# Patient Record
Sex: FEMALE | Race: WHITE | Employment: OTHER | URBAN - METROPOLITAN AREA
[De-identification: names, ages, dates, MRNs, and addresses within clinical notes are randomized per-mention and may not be internally consistent; named-entity substitution may affect disease eponyms.]

---

## 2018-01-01 ENCOUNTER — APPOINTMENT (OUTPATIENT)
Dept: GENERAL RADIOLOGY | Age: 78
DRG: 191 | End: 2018-01-01
Attending: EMERGENCY MEDICINE
Payer: MEDICARE

## 2018-01-01 ENCOUNTER — HOME CARE VISIT (OUTPATIENT)
Dept: SCHEDULING | Facility: HOME HEALTH | Age: 78
End: 2018-01-01
Payer: MEDICARE

## 2018-01-01 ENCOUNTER — PATIENT OUTREACH (OUTPATIENT)
Dept: CASE MANAGEMENT | Age: 78
End: 2018-01-01

## 2018-01-01 ENCOUNTER — HOME CARE VISIT (OUTPATIENT)
Dept: HOME HEALTH SERVICES | Facility: HOME HEALTH | Age: 78
End: 2018-01-01
Payer: MEDICARE

## 2018-01-01 ENCOUNTER — HOME HEALTH ADMISSION (OUTPATIENT)
Dept: HOME HEALTH SERVICES | Facility: HOME HEALTH | Age: 78
End: 2018-01-01
Payer: MEDICARE

## 2018-01-01 ENCOUNTER — APPOINTMENT (OUTPATIENT)
Dept: GENERAL RADIOLOGY | Age: 78
DRG: 191 | End: 2018-01-01
Attending: INTERNAL MEDICINE
Payer: MEDICARE

## 2018-01-01 ENCOUNTER — TELEPHONE (OUTPATIENT)
Dept: HOME HEALTH SERVICES | Facility: HOME HEALTH | Age: 78
End: 2018-01-01

## 2018-01-01 ENCOUNTER — HOSPITAL ENCOUNTER (INPATIENT)
Age: 78
LOS: 4 days | Discharge: HOME | DRG: 191 | End: 2018-10-16
Attending: EMERGENCY MEDICINE | Admitting: INTERNAL MEDICINE
Payer: MEDICARE

## 2018-01-01 ENCOUNTER — APPOINTMENT (OUTPATIENT)
Dept: GENERAL RADIOLOGY | Age: 78
DRG: 191 | End: 2018-01-01
Attending: FAMILY MEDICINE
Payer: MEDICARE

## 2018-01-01 ENCOUNTER — HOSPITAL ENCOUNTER (EMERGENCY)
Age: 78
Discharge: HOME | End: 2018-06-07
Attending: EMERGENCY MEDICINE
Payer: MEDICARE

## 2018-01-01 ENCOUNTER — APPOINTMENT (OUTPATIENT)
Dept: GENERAL RADIOLOGY | Age: 78
DRG: 871 | End: 2018-01-01
Attending: EMERGENCY MEDICINE
Payer: MEDICARE

## 2018-01-01 ENCOUNTER — APPOINTMENT (OUTPATIENT)
Dept: GENERAL RADIOLOGY | Age: 78
DRG: 871 | End: 2018-01-01
Attending: INTERNAL MEDICINE
Payer: MEDICARE

## 2018-01-01 ENCOUNTER — HOSPICE ADMISSION (OUTPATIENT)
Dept: HOSPICE | Facility: HOSPICE | Age: 78
End: 2018-01-01

## 2018-01-01 ENCOUNTER — APPOINTMENT (OUTPATIENT)
Dept: GENERAL RADIOLOGY | Age: 78
End: 2018-01-01
Attending: EMERGENCY MEDICINE
Payer: MEDICARE

## 2018-01-01 ENCOUNTER — HOSPITAL ENCOUNTER (INPATIENT)
Age: 78
LOS: 7 days | Discharge: NURSING, REHAB OR LONG-TERM CARE | DRG: 871 | End: 2018-11-28
Attending: FAMILY MEDICINE | Admitting: FAMILY MEDICINE
Payer: MEDICARE

## 2018-01-01 ENCOUNTER — APPOINTMENT (OUTPATIENT)
Dept: CT IMAGING | Age: 78
DRG: 871 | End: 2018-01-01
Attending: INTERNAL MEDICINE
Payer: MEDICARE

## 2018-01-01 ENCOUNTER — HOSPITAL ENCOUNTER (INPATIENT)
Age: 78
LOS: 6 days | Discharge: NURSING, REHAB OR LONG-TERM CARE | DRG: 871 | End: 2018-11-21
Attending: EMERGENCY MEDICINE | Admitting: INTERNAL MEDICINE
Payer: MEDICARE

## 2018-01-01 VITALS
DIASTOLIC BLOOD PRESSURE: 64 MMHG | TEMPERATURE: 98.1 F | SYSTOLIC BLOOD PRESSURE: 110 MMHG | OXYGEN SATURATION: 93 % | HEART RATE: 88 BPM | RESPIRATION RATE: 18 BRPM

## 2018-01-01 VITALS
RESPIRATION RATE: 22 BRPM | WEIGHT: 110 LBS | SYSTOLIC BLOOD PRESSURE: 169 MMHG | HEART RATE: 90 BPM | TEMPERATURE: 97.9 F | HEIGHT: 63 IN | OXYGEN SATURATION: 93 % | DIASTOLIC BLOOD PRESSURE: 77 MMHG | BODY MASS INDEX: 19.49 KG/M2

## 2018-01-01 VITALS
HEART RATE: 84 BPM | SYSTOLIC BLOOD PRESSURE: 122 MMHG | DIASTOLIC BLOOD PRESSURE: 64 MMHG | TEMPERATURE: 97.9 F | OXYGEN SATURATION: 98 % | RESPIRATION RATE: 18 BRPM

## 2018-01-01 VITALS
BODY MASS INDEX: 23.04 KG/M2 | OXYGEN SATURATION: 95 % | HEART RATE: 98 BPM | DIASTOLIC BLOOD PRESSURE: 89 MMHG | HEIGHT: 63 IN | TEMPERATURE: 98.3 F | RESPIRATION RATE: 20 BRPM | SYSTOLIC BLOOD PRESSURE: 164 MMHG | WEIGHT: 130 LBS

## 2018-01-01 VITALS
OXYGEN SATURATION: 98 % | TEMPERATURE: 98.2 F | HEART RATE: 84 BPM | SYSTOLIC BLOOD PRESSURE: 148 MMHG | DIASTOLIC BLOOD PRESSURE: 78 MMHG | RESPIRATION RATE: 18 BRPM

## 2018-01-01 VITALS
WEIGHT: 110 LBS | HEIGHT: 63 IN | HEART RATE: 82 BPM | OXYGEN SATURATION: 97 % | RESPIRATION RATE: 22 BRPM | TEMPERATURE: 97.6 F | SYSTOLIC BLOOD PRESSURE: 137 MMHG | BODY MASS INDEX: 19.49 KG/M2 | DIASTOLIC BLOOD PRESSURE: 65 MMHG

## 2018-01-01 VITALS
DIASTOLIC BLOOD PRESSURE: 60 MMHG | OXYGEN SATURATION: 92 % | RESPIRATION RATE: 18 BRPM | SYSTOLIC BLOOD PRESSURE: 140 MMHG | TEMPERATURE: 97.6 F | HEART RATE: 110 BPM

## 2018-01-01 VITALS
OXYGEN SATURATION: 98 % | RESPIRATION RATE: 20 BRPM | WEIGHT: 121.5 LBS | DIASTOLIC BLOOD PRESSURE: 59 MMHG | SYSTOLIC BLOOD PRESSURE: 101 MMHG | HEART RATE: 83 BPM | HEIGHT: 64 IN | TEMPERATURE: 97.6 F | BODY MASS INDEX: 20.74 KG/M2

## 2018-01-01 DIAGNOSIS — J44.1 ACUTE EXACERBATION OF CHRONIC OBSTRUCTIVE PULMONARY DISEASE (COPD) (HCC): Primary | ICD-10-CM

## 2018-01-01 DIAGNOSIS — A41.9 SEPSIS, DUE TO UNSPECIFIED ORGANISM: ICD-10-CM

## 2018-01-01 DIAGNOSIS — I50.33 ACUTE ON CHRONIC DIASTOLIC HEART FAILURE (HCC): Chronic | ICD-10-CM

## 2018-01-01 DIAGNOSIS — F17.210 CIGARETTE NICOTINE DEPENDENCE WITHOUT COMPLICATION: Chronic | ICD-10-CM

## 2018-01-01 DIAGNOSIS — J18.9 HCAP (HEALTHCARE-ASSOCIATED PNEUMONIA): ICD-10-CM

## 2018-01-01 DIAGNOSIS — J44.1 COPD, FREQUENT EXACERBATIONS (HCC): ICD-10-CM

## 2018-01-01 DIAGNOSIS — Z51.5 ENCOUNTER FOR PALLIATIVE CARE: ICD-10-CM

## 2018-01-01 DIAGNOSIS — J44.9 COPD, VERY SEVERE (HCC): Chronic | ICD-10-CM

## 2018-01-01 DIAGNOSIS — J96.21 ACUTE ON CHRONIC RESPIRATORY FAILURE WITH HYPOXIA AND HYPERCAPNIA (HCC): ICD-10-CM

## 2018-01-01 DIAGNOSIS — G93.41 METABOLIC ENCEPHALOPATHY: ICD-10-CM

## 2018-01-01 DIAGNOSIS — R06.02 SHORTNESS OF BREATH: ICD-10-CM

## 2018-01-01 DIAGNOSIS — Z66 DNR (DO NOT RESUSCITATE): ICD-10-CM

## 2018-01-01 DIAGNOSIS — R53.83 FATIGUE, UNSPECIFIED TYPE: ICD-10-CM

## 2018-01-01 DIAGNOSIS — R53.81 DEBILITY: ICD-10-CM

## 2018-01-01 DIAGNOSIS — D64.9 ANEMIA, UNSPECIFIED TYPE: ICD-10-CM

## 2018-01-01 DIAGNOSIS — N17.9 AKI (ACUTE KIDNEY INJURY) (HCC): ICD-10-CM

## 2018-01-01 DIAGNOSIS — J96.22 ACUTE ON CHRONIC RESPIRATORY FAILURE WITH HYPOXIA AND HYPERCAPNIA (HCC): ICD-10-CM

## 2018-01-01 DIAGNOSIS — I25.118 ATHEROSCLEROSIS OF NATIVE CORONARY ARTERY OF NATIVE HEART WITH STABLE ANGINA PECTORIS (HCC): Chronic | ICD-10-CM

## 2018-01-01 DIAGNOSIS — R54 FRAILTY: ICD-10-CM

## 2018-01-01 DIAGNOSIS — I10 HTN (HYPERTENSION), MALIGNANT: ICD-10-CM

## 2018-01-01 DIAGNOSIS — Z95.2 S/P AVR (AORTIC VALVE REPLACEMENT): Chronic | ICD-10-CM

## 2018-01-01 DIAGNOSIS — G93.41 ACUTE METABOLIC ENCEPHALOPATHY: Primary | ICD-10-CM

## 2018-01-01 DIAGNOSIS — J44.1 COPD EXACERBATION (HCC): ICD-10-CM

## 2018-01-01 LAB
ABO + RH BLD: NORMAL
ABO + RH BLD: NORMAL
ALBUMIN SERPL-MCNC: 2 G/DL (ref 3.2–4.6)
ALBUMIN SERPL-MCNC: 2.4 G/DL (ref 3.2–4.6)
ALBUMIN SERPL-MCNC: 3 G/DL (ref 3.2–4.6)
ALBUMIN SERPL-MCNC: 3.4 G/DL (ref 3.2–4.6)
ALBUMIN SERPL-MCNC: 3.5 G/DL (ref 3.2–4.6)
ALBUMIN/GLOB SERPL: 0.5 {RATIO} (ref 1.2–3.5)
ALBUMIN/GLOB SERPL: 0.6 {RATIO} (ref 1.2–3.5)
ALBUMIN/GLOB SERPL: 0.7 {RATIO} (ref 1.2–3.5)
ALBUMIN/GLOB SERPL: 0.7 {RATIO} (ref 1.2–3.5)
ALBUMIN/GLOB SERPL: 0.8 {RATIO} (ref 1.2–3.5)
ALP SERPL-CCNC: 105 U/L (ref 50–136)
ALP SERPL-CCNC: 114 U/L (ref 50–136)
ALP SERPL-CCNC: 56 U/L (ref 50–136)
ALP SERPL-CCNC: 73 U/L (ref 50–136)
ALP SERPL-CCNC: 81 U/L (ref 50–136)
ALT SERPL-CCNC: 19 U/L (ref 12–65)
ALT SERPL-CCNC: 20 U/L (ref 12–65)
ALT SERPL-CCNC: 22 U/L (ref 12–65)
ALT SERPL-CCNC: 23 U/L (ref 12–65)
ALT SERPL-CCNC: 24 U/L (ref 12–65)
AMMONIA PLAS-SCNC: 28 UMOL/L (ref 11–32)
AMPHET UR QL SCN: NEGATIVE
ANION GAP SERPL CALC-SCNC: 1 MMOL/L (ref 7–16)
ANION GAP SERPL CALC-SCNC: 10 MMOL/L (ref 7–16)
ANION GAP SERPL CALC-SCNC: 11 MMOL/L (ref 7–16)
ANION GAP SERPL CALC-SCNC: 11 MMOL/L (ref 7–16)
ANION GAP SERPL CALC-SCNC: 14 MMOL/L (ref 7–16)
ANION GAP SERPL CALC-SCNC: 2 MMOL/L (ref 7–16)
ANION GAP SERPL CALC-SCNC: 3 MMOL/L (ref 7–16)
ANION GAP SERPL CALC-SCNC: 3 MMOL/L (ref 7–16)
ANION GAP SERPL CALC-SCNC: 4 MMOL/L (ref 7–16)
ANION GAP SERPL CALC-SCNC: 5 MMOL/L (ref 7–16)
ANION GAP SERPL CALC-SCNC: 5 MMOL/L (ref 7–16)
ANION GAP SERPL CALC-SCNC: 6 MMOL/L (ref 7–16)
ANION GAP SERPL CALC-SCNC: 6 MMOL/L (ref 7–16)
ANION GAP SERPL CALC-SCNC: 7 MMOL/L (ref 7–16)
ANION GAP SERPL CALC-SCNC: 8 MMOL/L (ref 7–16)
APPEARANCE UR: CLEAR
APPEARANCE UR: CLEAR
ARTERIAL PATENCY WRIST A: POSITIVE
ARTERIAL PATENCY WRIST A: YES
AST SERPL-CCNC: 13 U/L (ref 15–37)
AST SERPL-CCNC: 16 U/L (ref 15–37)
AST SERPL-CCNC: 20 U/L (ref 15–37)
AST SERPL-CCNC: 28 U/L (ref 15–37)
AST SERPL-CCNC: 29 U/L (ref 15–37)
ATRIAL RATE: 128 BPM
ATRIAL RATE: 128 BPM
ATRIAL RATE: 76 BPM
ATRIAL RATE: 93 BPM
ATRIAL RATE: 97 BPM
ATRIAL RATE: 98 BPM
BACTERIA SPEC CULT: NORMAL
BACTERIA URNS QL MICRO: 0 /HPF
BACTERIA URNS QL MICRO: ABNORMAL /HPF
BARBITURATES UR QL SCN: NEGATIVE
BASE EXCESS BLD CALC-SCNC: 10 MMOL/L
BASE EXCESS BLD CALC-SCNC: 11 MMOL/L
BASE EXCESS BLD CALC-SCNC: 12 MMOL/L
BASE EXCESS BLD CALC-SCNC: 13 MMOL/L
BASE EXCESS BLD CALC-SCNC: 13 MMOL/L
BASE EXCESS BLD CALC-SCNC: 15 MMOL/L
BASE EXCESS BLD CALC-SCNC: 8 MMOL/L
BASE EXCESS BLD CALC-SCNC: 8 MMOL/L
BASE EXCESS BLD CALC-SCNC: 9 MMOL/L
BASE EXCESS BLDA CALC-SCNC: 4.8 MMOL/L (ref 0–3)
BASOPHILS # BLD: 0 K/UL (ref 0–0.2)
BASOPHILS NFR BLD: 0 % (ref 0–2)
BDY SITE: ABNORMAL
BENZODIAZ UR QL: NEGATIVE
BILIRUB SERPL-MCNC: 0.2 MG/DL (ref 0.2–1.1)
BILIRUB SERPL-MCNC: 0.3 MG/DL (ref 0.2–1.1)
BILIRUB SERPL-MCNC: 0.3 MG/DL (ref 0.2–1.1)
BILIRUB SERPL-MCNC: 0.4 MG/DL (ref 0.2–1.1)
BILIRUB SERPL-MCNC: 1 MG/DL (ref 0.2–1.1)
BILIRUB UR QL: NEGATIVE
BILIRUB UR QL: NEGATIVE
BLD PROD TYP BPU: NORMAL
BLOOD GROUP ANTIBODIES SERPL: NORMAL
BNP SERPL-MCNC: 805 PG/ML
BODY TEMPERATURE: 98.6
BPU ID: NORMAL
BUN SERPL-MCNC: 12 MG/DL (ref 8–23)
BUN SERPL-MCNC: 15 MG/DL (ref 8–23)
BUN SERPL-MCNC: 16 MG/DL (ref 8–23)
BUN SERPL-MCNC: 16 MG/DL (ref 8–23)
BUN SERPL-MCNC: 17 MG/DL (ref 8–23)
BUN SERPL-MCNC: 18 MG/DL (ref 8–23)
BUN SERPL-MCNC: 19 MG/DL (ref 8–23)
BUN SERPL-MCNC: 20 MG/DL (ref 8–23)
BUN SERPL-MCNC: 21 MG/DL (ref 8–23)
BUN SERPL-MCNC: 22 MG/DL (ref 8–23)
BUN SERPL-MCNC: 22 MG/DL (ref 8–23)
BUN SERPL-MCNC: 23 MG/DL (ref 8–23)
CALCIUM SERPL-MCNC: 7.3 MG/DL (ref 8.3–10.4)
CALCIUM SERPL-MCNC: 7.3 MG/DL (ref 8.3–10.4)
CALCIUM SERPL-MCNC: 7.4 MG/DL (ref 8.3–10.4)
CALCIUM SERPL-MCNC: 7.7 MG/DL (ref 8.3–10.4)
CALCIUM SERPL-MCNC: 7.9 MG/DL (ref 8.3–10.4)
CALCIUM SERPL-MCNC: 8.2 MG/DL (ref 8.3–10.4)
CALCIUM SERPL-MCNC: 8.2 MG/DL (ref 8.3–10.4)
CALCIUM SERPL-MCNC: 8.3 MG/DL (ref 8.3–10.4)
CALCIUM SERPL-MCNC: 8.4 MG/DL (ref 8.3–10.4)
CALCIUM SERPL-MCNC: 8.4 MG/DL (ref 8.3–10.4)
CALCIUM SERPL-MCNC: 8.6 MG/DL (ref 8.3–10.4)
CALCIUM SERPL-MCNC: 8.8 MG/DL (ref 8.3–10.4)
CALCIUM SERPL-MCNC: 9.1 MG/DL (ref 8.3–10.4)
CALCIUM SERPL-MCNC: 9.2 MG/DL (ref 8.3–10.4)
CALCIUM SERPL-MCNC: 9.4 MG/DL (ref 8.3–10.4)
CALCULATED P AXIS, ECG09: 75 DEGREES
CALCULATED P AXIS, ECG09: 80 DEGREES
CALCULATED P AXIS, ECG09: 82 DEGREES
CALCULATED P AXIS, ECG09: 86 DEGREES
CALCULATED P AXIS, ECG09: 86 DEGREES
CALCULATED P AXIS, ECG09: 89 DEGREES
CALCULATED R AXIS, ECG10: 118 DEGREES
CALCULATED R AXIS, ECG10: 122 DEGREES
CALCULATED R AXIS, ECG10: 130 DEGREES
CALCULATED R AXIS, ECG10: 133 DEGREES
CALCULATED R AXIS, ECG10: 147 DEGREES
CALCULATED R AXIS, ECG10: 167 DEGREES
CALCULATED T AXIS, ECG11: 70 DEGREES
CALCULATED T AXIS, ECG11: 71 DEGREES
CALCULATED T AXIS, ECG11: 72 DEGREES
CALCULATED T AXIS, ECG11: 74 DEGREES
CALCULATED T AXIS, ECG11: 83 DEGREES
CALCULATED T AXIS, ECG11: 85 DEGREES
CANNABINOIDS UR QL SCN: NEGATIVE
CASTS URNS QL MICRO: ABNORMAL /LPF
CASTS URNS QL MICRO: ABNORMAL /LPF
CHLORIDE SERPL-SCNC: 100 MMOL/L (ref 98–107)
CHLORIDE SERPL-SCNC: 100 MMOL/L (ref 98–107)
CHLORIDE SERPL-SCNC: 101 MMOL/L (ref 98–107)
CHLORIDE SERPL-SCNC: 101 MMOL/L (ref 98–107)
CHLORIDE SERPL-SCNC: 102 MMOL/L (ref 98–107)
CHLORIDE SERPL-SCNC: 103 MMOL/L (ref 98–107)
CHLORIDE SERPL-SCNC: 104 MMOL/L (ref 98–107)
CHLORIDE SERPL-SCNC: 104 MMOL/L (ref 98–107)
CHLORIDE SERPL-SCNC: 108 MMOL/L (ref 98–107)
CHLORIDE SERPL-SCNC: 83 MMOL/L (ref 98–107)
CHLORIDE SERPL-SCNC: 91 MMOL/L (ref 98–107)
CHLORIDE SERPL-SCNC: 93 MMOL/L (ref 98–107)
CHLORIDE SERPL-SCNC: 99 MMOL/L (ref 98–107)
CO2 BLD-SCNC: 36 MMOL/L
CO2 BLD-SCNC: 37 MMOL/L
CO2 BLD-SCNC: 38 MMOL/L
CO2 BLD-SCNC: 39 MMOL/L
CO2 BLD-SCNC: 39 MMOL/L
CO2 BLD-SCNC: 41 MMOL/L
CO2 BLD-SCNC: 42 MMOL/L
CO2 BLD-SCNC: 42 MMOL/L
CO2 BLD-SCNC: 43 MMOL/L
CO2 SERPL-SCNC: 29 MMOL/L (ref 21–32)
CO2 SERPL-SCNC: 30 MMOL/L (ref 21–32)
CO2 SERPL-SCNC: 32 MMOL/L (ref 21–32)
CO2 SERPL-SCNC: 33 MMOL/L (ref 21–32)
CO2 SERPL-SCNC: 34 MMOL/L (ref 21–32)
CO2 SERPL-SCNC: 34 MMOL/L (ref 21–32)
CO2 SERPL-SCNC: 35 MMOL/L (ref 21–32)
CO2 SERPL-SCNC: 35 MMOL/L (ref 21–32)
CO2 SERPL-SCNC: 36 MMOL/L (ref 21–32)
CO2 SERPL-SCNC: 36 MMOL/L (ref 21–32)
CO2 SERPL-SCNC: 37 MMOL/L (ref 21–32)
CO2 SERPL-SCNC: 38 MMOL/L (ref 21–32)
CO2 SERPL-SCNC: 39 MMOL/L (ref 21–32)
CO2 SERPL-SCNC: 42 MMOL/L (ref 21–32)
CO2 SERPL-SCNC: 42 MMOL/L (ref 21–32)
COCAINE UR QL SCN: NEGATIVE
COHGB MFR BLD: 2.1 % (ref 0.5–1.5)
COLLECT TIME,HTIME: 1313
COLLECT TIME,HTIME: 1538
COLLECT TIME,HTIME: 1955
COLLECT TIME,HTIME: 340
COLLECT TIME,HTIME: 908
COLOR UR: YELLOW
COLOR UR: YELLOW
CREAT SERPL-MCNC: 0.6 MG/DL (ref 0.6–1)
CREAT SERPL-MCNC: 0.72 MG/DL (ref 0.6–1)
CREAT SERPL-MCNC: 0.72 MG/DL (ref 0.6–1)
CREAT SERPL-MCNC: 0.75 MG/DL (ref 0.6–1)
CREAT SERPL-MCNC: 0.76 MG/DL (ref 0.6–1)
CREAT SERPL-MCNC: 0.78 MG/DL (ref 0.6–1)
CREAT SERPL-MCNC: 0.79 MG/DL (ref 0.6–1)
CREAT SERPL-MCNC: 0.85 MG/DL (ref 0.6–1)
CREAT SERPL-MCNC: 0.88 MG/DL (ref 0.6–1)
CREAT SERPL-MCNC: 0.91 MG/DL (ref 0.6–1)
CREAT SERPL-MCNC: 0.92 MG/DL (ref 0.6–1)
CREAT SERPL-MCNC: 0.96 MG/DL (ref 0.6–1)
CREAT SERPL-MCNC: 1.09 MG/DL (ref 0.6–1)
CREAT SERPL-MCNC: 1.17 MG/DL (ref 0.6–1)
CREAT SERPL-MCNC: 1.38 MG/DL (ref 0.6–1)
CROSSMATCH RESULT,%XM: NORMAL
DIAGNOSIS, 93000: NORMAL
DIFFERENTIAL METHOD BLD: ABNORMAL
DO-HGB BLD-MCNC: 2 % (ref 0–5)
EOSINOPHIL # BLD: 0 K/UL (ref 0–0.8)
EOSINOPHIL # BLD: 0.1 K/UL (ref 0–0.8)
EOSINOPHIL NFR BLD: 0 % (ref 0.5–7.8)
EOSINOPHIL NFR BLD: 1 % (ref 0.5–7.8)
EPI CELLS #/AREA URNS HPF: ABNORMAL /HPF
EPI CELLS #/AREA URNS HPF: ABNORMAL /HPF
ERYTHROCYTE [DISTWIDTH] IN BLOOD BY AUTOMATED COUNT: 13.6 % (ref 11.9–14.6)
ERYTHROCYTE [DISTWIDTH] IN BLOOD BY AUTOMATED COUNT: 14.4 %
ERYTHROCYTE [DISTWIDTH] IN BLOOD BY AUTOMATED COUNT: 14.4 %
ERYTHROCYTE [DISTWIDTH] IN BLOOD BY AUTOMATED COUNT: 14.5 %
ERYTHROCYTE [DISTWIDTH] IN BLOOD BY AUTOMATED COUNT: 14.6 %
ERYTHROCYTE [DISTWIDTH] IN BLOOD BY AUTOMATED COUNT: 14.6 %
ERYTHROCYTE [DISTWIDTH] IN BLOOD BY AUTOMATED COUNT: 15.1 %
ERYTHROCYTE [DISTWIDTH] IN BLOOD BY AUTOMATED COUNT: 15.1 %
ERYTHROCYTE [DISTWIDTH] IN BLOOD BY AUTOMATED COUNT: 15.4 %
ERYTHROCYTE [DISTWIDTH] IN BLOOD BY AUTOMATED COUNT: 15.8 %
ERYTHROCYTE [DISTWIDTH] IN BLOOD BY AUTOMATED COUNT: 15.9 %
ERYTHROCYTE [DISTWIDTH] IN BLOOD BY AUTOMATED COUNT: 16 %
ERYTHROCYTE [DISTWIDTH] IN BLOOD BY AUTOMATED COUNT: 16.6 %
FERRITIN SERPL-MCNC: 82 NG/ML (ref 8–388)
FLOW RATE ISTAT,IFRATE: 3 L/MIN
FLOW RATE ISTAT,IFRATE: 3.5 L/MIN
FLOW RATE ISTAT,IFRATE: 4 L/MIN
FLOW RATE ISTAT,IFRATE: 40 L/MIN
FLOW RATE ISTAT,IFRATE: 7 L/MIN
GAS FLOW.O2 O2 DELIVERY SYS: 2 L/MIN
GAS FLOW.O2 O2 DELIVERY SYS: ABNORMAL L/MIN
GAS FLOW.O2 SETTING OXYMISER: 10 BPM
GAS FLOW.O2 SETTING OXYMISER: 8 BPM
GLOBULIN SER CALC-MCNC: 4.1 G/DL (ref 2.3–3.5)
GLOBULIN SER CALC-MCNC: 4.1 G/DL (ref 2.3–3.5)
GLOBULIN SER CALC-MCNC: 4.2 G/DL (ref 2.3–3.5)
GLOBULIN SER CALC-MCNC: 4.2 G/DL (ref 2.3–3.5)
GLOBULIN SER CALC-MCNC: 4.6 G/DL (ref 2.3–3.5)
GLUCOSE BLD STRIP.AUTO-MCNC: 112 MG/DL (ref 65–100)
GLUCOSE BLD STRIP.AUTO-MCNC: 121 MG/DL (ref 65–100)
GLUCOSE BLD STRIP.AUTO-MCNC: 127 MG/DL (ref 65–100)
GLUCOSE BLD STRIP.AUTO-MCNC: 132 MG/DL (ref 65–100)
GLUCOSE BLD STRIP.AUTO-MCNC: 144 MG/DL (ref 65–100)
GLUCOSE BLD STRIP.AUTO-MCNC: 153 MG/DL (ref 65–100)
GLUCOSE BLD STRIP.AUTO-MCNC: 159 MG/DL (ref 65–100)
GLUCOSE BLD STRIP.AUTO-MCNC: 175 MG/DL (ref 65–100)
GLUCOSE BLD STRIP.AUTO-MCNC: 195 MG/DL (ref 65–100)
GLUCOSE BLD STRIP.AUTO-MCNC: 198 MG/DL (ref 65–100)
GLUCOSE BLD STRIP.AUTO-MCNC: 221 MG/DL (ref 65–100)
GLUCOSE BLD STRIP.AUTO-MCNC: 237 MG/DL (ref 65–100)
GLUCOSE BLD STRIP.AUTO-MCNC: 268 MG/DL (ref 65–100)
GLUCOSE SERPL-MCNC: 104 MG/DL (ref 65–100)
GLUCOSE SERPL-MCNC: 119 MG/DL (ref 65–100)
GLUCOSE SERPL-MCNC: 122 MG/DL (ref 65–100)
GLUCOSE SERPL-MCNC: 127 MG/DL (ref 65–100)
GLUCOSE SERPL-MCNC: 142 MG/DL (ref 65–100)
GLUCOSE SERPL-MCNC: 149 MG/DL (ref 65–100)
GLUCOSE SERPL-MCNC: 155 MG/DL (ref 65–100)
GLUCOSE SERPL-MCNC: 163 MG/DL (ref 65–100)
GLUCOSE SERPL-MCNC: 168 MG/DL (ref 65–100)
GLUCOSE SERPL-MCNC: 74 MG/DL (ref 65–100)
GLUCOSE SERPL-MCNC: 75 MG/DL (ref 65–100)
GLUCOSE SERPL-MCNC: 84 MG/DL (ref 65–100)
GLUCOSE SERPL-MCNC: 87 MG/DL (ref 65–100)
GLUCOSE SERPL-MCNC: 88 MG/DL (ref 65–100)
GLUCOSE SERPL-MCNC: 91 MG/DL (ref 65–100)
GLUCOSE SERPL-MCNC: 99 MG/DL (ref 65–100)
GLUCOSE SERPL-MCNC: 99 MG/DL (ref 65–100)
GLUCOSE UR STRIP.AUTO-MCNC: NEGATIVE MG/DL
GLUCOSE UR STRIP.AUTO-MCNC: NEGATIVE MG/DL
HCO3 BLD-SCNC: 34.4 MMOL/L (ref 22–26)
HCO3 BLD-SCNC: 34.9 MMOL/L (ref 22–26)
HCO3 BLD-SCNC: 36.5 MMOL/L (ref 22–26)
HCO3 BLD-SCNC: 36.8 MMOL/L (ref 22–26)
HCO3 BLD-SCNC: 37.3 MMOL/L (ref 22–26)
HCO3 BLD-SCNC: 38.6 MMOL/L (ref 22–26)
HCO3 BLD-SCNC: 40 MMOL/L (ref 22–26)
HCO3 BLD-SCNC: 40.1 MMOL/L (ref 22–26)
HCO3 BLD-SCNC: 40.8 MMOL/L (ref 22–26)
HCO3 BLDA-SCNC: 32 MMOL/L (ref 22–26)
HCT VFR BLD AUTO: 21.1 % (ref 35.8–46.3)
HCT VFR BLD AUTO: 22.5 % (ref 35.8–46.3)
HCT VFR BLD AUTO: 23.4 % (ref 35.8–46.3)
HCT VFR BLD AUTO: 25.3 % (ref 35.8–46.3)
HCT VFR BLD AUTO: 25.5 % (ref 35.8–46.3)
HCT VFR BLD AUTO: 26.8 % (ref 35.8–46.3)
HCT VFR BLD AUTO: 27.3 % (ref 35.8–46.3)
HCT VFR BLD AUTO: 27.7 % (ref 35.8–46.3)
HCT VFR BLD AUTO: 28.1 % (ref 35.8–46.3)
HCT VFR BLD AUTO: 28.1 % (ref 35.8–46.3)
HCT VFR BLD AUTO: 28.8 % (ref 35.8–46.3)
HCT VFR BLD AUTO: 29.2 % (ref 35.8–46.3)
HCT VFR BLD AUTO: 30.2 % (ref 35.8–46.3)
HCT VFR BLD AUTO: 31.1 % (ref 35.8–46.3)
HCT VFR BLD AUTO: 31.1 % (ref 35.8–46.3)
HCT VFR BLD AUTO: 31.7 % (ref 35.8–46.3)
HCT VFR BLD AUTO: 32.8 % (ref 35.8–46.3)
HCT VFR BLD AUTO: 33.7 % (ref 35.8–46.3)
HCT VFR BLD AUTO: 33.8 % (ref 35.8–46.3)
HCT VFR BLD AUTO: 40.8 % (ref 35.8–46.3)
HGB BLD-MCNC: 10 G/DL (ref 11.7–15.4)
HGB BLD-MCNC: 10 G/DL (ref 11.7–15.4)
HGB BLD-MCNC: 13.3 G/DL (ref 11.7–15.4)
HGB BLD-MCNC: 6.2 G/DL (ref 11.7–15.4)
HGB BLD-MCNC: 7 G/DL (ref 11.7–15.4)
HGB BLD-MCNC: 7 G/DL (ref 11.7–15.4)
HGB BLD-MCNC: 7.3 G/DL (ref 11.7–15.4)
HGB BLD-MCNC: 7.7 G/DL (ref 11.7–15.4)
HGB BLD-MCNC: 7.8 G/DL (ref 11.7–15.4)
HGB BLD-MCNC: 7.8 G/DL (ref 11.7–15.4)
HGB BLD-MCNC: 7.9 G/DL (ref 11.7–15.4)
HGB BLD-MCNC: 8.2 G/DL (ref 11.7–15.4)
HGB BLD-MCNC: 8.3 G/DL (ref 11.7–15.4)
HGB BLD-MCNC: 8.6 G/DL (ref 11.7–15.4)
HGB BLD-MCNC: 8.8 G/DL (ref 11.7–15.4)
HGB BLD-MCNC: 8.9 G/DL (ref 11.7–15.4)
HGB BLD-MCNC: 9.5 G/DL (ref 11.7–15.4)
HGB BLD-MCNC: 9.5 G/DL (ref 11.7–15.4)
HGB BLD-MCNC: 9.7 G/DL (ref 11.7–15.4)
HGB BLD-MCNC: 9.8 G/DL (ref 11.7–15.4)
HGB BLDMV-MCNC: 13.7 GM/DL (ref 11.7–15)
HGB UR QL STRIP: ABNORMAL
HGB UR QL STRIP: NEGATIVE
IMM GRANULOCYTES # BLD: 0 K/UL (ref 0–0.5)
IMM GRANULOCYTES # BLD: 0.1 K/UL (ref 0–0.5)
IMM GRANULOCYTES # BLD: 0.3 K/UL (ref 0–0.5)
IMM GRANULOCYTES NFR BLD AUTO: 0 % (ref 0–5)
IMM GRANULOCYTES NFR BLD AUTO: 0 % (ref 0–5)
IMM GRANULOCYTES NFR BLD AUTO: 1 % (ref 0–5)
INR PPP: 1
INSPIRATION.DURATION SETTING TIME VENT: 0.9 SEC
INSPIRATION.DURATION SETTING TIME VENT: 0.9 SEC
IRON SATN MFR SERPL: 8 %
IRON SERPL-MCNC: 27 UG/DL (ref 35–150)
KETONES UR QL STRIP.AUTO: NEGATIVE MG/DL
KETONES UR QL STRIP.AUTO: NEGATIVE MG/DL
LACTATE BLD-SCNC: 0.72 MMOL/L (ref 0.5–1.9)
LACTATE BLD-SCNC: 3.21 MMOL/L (ref 0.5–1.9)
LACTATE SERPL-SCNC: 1.5 MMOL/L (ref 0.4–2)
LEUKOCYTE ESTERASE UR QL STRIP.AUTO: NEGATIVE
LEUKOCYTE ESTERASE UR QL STRIP.AUTO: NEGATIVE
LYMPHOCYTES # BLD: 0.5 K/UL (ref 0.5–4.6)
LYMPHOCYTES # BLD: 0.5 K/UL (ref 0.5–4.6)
LYMPHOCYTES # BLD: 0.6 K/UL (ref 0.5–4.6)
LYMPHOCYTES # BLD: 0.6 K/UL (ref 0.5–4.6)
LYMPHOCYTES # BLD: 0.7 K/UL (ref 0.5–4.6)
LYMPHOCYTES # BLD: 1 K/UL (ref 0.5–4.6)
LYMPHOCYTES # BLD: 1.1 K/UL (ref 0.5–4.6)
LYMPHOCYTES # BLD: 1.3 K/UL (ref 0.5–4.6)
LYMPHOCYTES # BLD: 1.4 K/UL (ref 0.5–4.6)
LYMPHOCYTES # BLD: 1.7 K/UL (ref 0.5–4.6)
LYMPHOCYTES # BLD: 1.9 K/UL (ref 0.5–4.6)
LYMPHOCYTES # BLD: 1.9 K/UL (ref 0.5–4.6)
LYMPHOCYTES NFR BLD: 10 % (ref 13–44)
LYMPHOCYTES NFR BLD: 13 % (ref 13–44)
LYMPHOCYTES NFR BLD: 13 % (ref 13–44)
LYMPHOCYTES NFR BLD: 16 % (ref 13–44)
LYMPHOCYTES NFR BLD: 16 % (ref 13–44)
LYMPHOCYTES NFR BLD: 17 % (ref 13–44)
LYMPHOCYTES NFR BLD: 18 % (ref 13–44)
LYMPHOCYTES NFR BLD: 18 % (ref 13–44)
LYMPHOCYTES NFR BLD: 5 % (ref 13–44)
LYMPHOCYTES NFR BLD: 6 % (ref 13–44)
LYMPHOCYTES NFR BLD: 7 % (ref 13–44)
LYMPHOCYTES NFR BLD: 8 % (ref 13–44)
LYMPHOCYTES NFR BLD: 8 % (ref 13–44)
LYMPHOCYTES NFR BLD: 9 % (ref 13–44)
MAGNESIUM SERPL-MCNC: 2 MG/DL (ref 1.8–2.4)
MCH RBC QN AUTO: 26.6 PG (ref 26.1–32.9)
MCH RBC QN AUTO: 26.8 PG (ref 26.1–32.9)
MCH RBC QN AUTO: 26.8 PG (ref 26.1–32.9)
MCH RBC QN AUTO: 26.9 PG (ref 26.1–32.9)
MCH RBC QN AUTO: 27.2 PG (ref 26.1–32.9)
MCH RBC QN AUTO: 27.3 PG (ref 26.1–32.9)
MCH RBC QN AUTO: 27.4 PG (ref 26.1–32.9)
MCH RBC QN AUTO: 27.5 PG (ref 26.1–32.9)
MCH RBC QN AUTO: 27.6 PG (ref 26.1–32.9)
MCH RBC QN AUTO: 27.6 PG (ref 26.1–32.9)
MCH RBC QN AUTO: 27.7 PG (ref 26.1–32.9)
MCH RBC QN AUTO: 27.8 PG (ref 26.1–32.9)
MCH RBC QN AUTO: 27.8 PG (ref 26.1–32.9)
MCH RBC QN AUTO: 28.5 PG (ref 26.1–32.9)
MCH RBC QN AUTO: 30.5 PG (ref 26.1–32.9)
MCHC RBC AUTO-ENTMCNC: 28.2 G/DL (ref 31.4–35)
MCHC RBC AUTO-ENTMCNC: 28.6 G/DL (ref 31.4–35)
MCHC RBC AUTO-ENTMCNC: 28.8 G/DL (ref 31.4–35)
MCHC RBC AUTO-ENTMCNC: 29 G/DL (ref 31.4–35)
MCHC RBC AUTO-ENTMCNC: 29.2 G/DL (ref 31.4–35)
MCHC RBC AUTO-ENTMCNC: 29.4 G/DL (ref 31.4–35)
MCHC RBC AUTO-ENTMCNC: 29.5 G/DL (ref 31.4–35)
MCHC RBC AUTO-ENTMCNC: 29.6 G/DL (ref 31.4–35)
MCHC RBC AUTO-ENTMCNC: 29.7 G/DL (ref 31.4–35)
MCHC RBC AUTO-ENTMCNC: 29.9 G/DL (ref 31.4–35)
MCHC RBC AUTO-ENTMCNC: 30.8 G/DL (ref 31.4–35)
MCHC RBC AUTO-ENTMCNC: 31.3 G/DL (ref 31.4–35)
MCHC RBC AUTO-ENTMCNC: 32.6 G/DL (ref 31.4–35)
MCV RBC AUTO: 85.9 FL (ref 79.6–97.8)
MCV RBC AUTO: 86.9 FL (ref 79.6–97.8)
MCV RBC AUTO: 89 FL (ref 79.6–97.8)
MCV RBC AUTO: 91.3 FL (ref 79.6–97.8)
MCV RBC AUTO: 93.3 FL (ref 79.6–97.8)
MCV RBC AUTO: 93.4 FL (ref 79.6–97.8)
MCV RBC AUTO: 93.6 FL (ref 79.6–97.8)
MCV RBC AUTO: 93.7 FL (ref 79.6–97.8)
MCV RBC AUTO: 94.4 FL (ref 79.6–97.8)
MCV RBC AUTO: 94.4 FL (ref 79.6–97.8)
MCV RBC AUTO: 95.3 FL (ref 79.6–97.8)
MCV RBC AUTO: 95.5 FL (ref 79.6–97.8)
MCV RBC AUTO: 96 FL (ref 79.6–97.8)
MCV RBC AUTO: 96 FL (ref 79.6–97.8)
MCV RBC AUTO: 96.5 FL (ref 79.6–97.8)
METHADONE UR QL: NEGATIVE
METHGB MFR BLD: 0.2 % (ref 0–1.5)
MONOCYTES # BLD: 0.1 K/UL (ref 0.1–1.3)
MONOCYTES # BLD: 0.2 K/UL (ref 0.1–1.3)
MONOCYTES # BLD: 0.3 K/UL (ref 0.1–1.3)
MONOCYTES # BLD: 0.3 K/UL (ref 0.1–1.3)
MONOCYTES # BLD: 0.4 K/UL (ref 0.1–1.3)
MONOCYTES # BLD: 0.8 K/UL (ref 0.1–1.3)
MONOCYTES # BLD: 0.8 K/UL (ref 0.1–1.3)
MONOCYTES # BLD: 1.1 K/UL (ref 0.1–1.3)
MONOCYTES # BLD: 1.3 K/UL (ref 0.1–1.3)
MONOCYTES # BLD: 1.4 K/UL (ref 0.1–1.3)
MONOCYTES # BLD: 1.4 K/UL (ref 0.1–1.3)
MONOCYTES # BLD: 1.5 K/UL (ref 0.1–1.3)
MONOCYTES # BLD: 1.7 K/UL (ref 0.1–1.3)
MONOCYTES # BLD: 1.8 K/UL (ref 0.1–1.3)
MONOCYTES NFR BLD: 11 % (ref 4–12)
MONOCYTES NFR BLD: 11 % (ref 4–12)
MONOCYTES NFR BLD: 13 % (ref 4–12)
MONOCYTES NFR BLD: 14 % (ref 4–12)
MONOCYTES NFR BLD: 3 % (ref 4–12)
MONOCYTES NFR BLD: 3 % (ref 4–12)
MONOCYTES NFR BLD: 4 % (ref 4–12)
MONOCYTES NFR BLD: 5 % (ref 4–12)
MONOCYTES NFR BLD: 6 % (ref 4–12)
MONOCYTES NFR BLD: 8 % (ref 4–12)
MONOCYTES NFR BLD: 9 % (ref 4–12)
MONOCYTES NFR BLD: 9 % (ref 4–12)
MUCOUS THREADS URNS QL MICRO: ABNORMAL /LPF
NEUTS SEG # BLD: 10.2 K/UL (ref 1.7–8.2)
NEUTS SEG # BLD: 10.4 K/UL (ref 1.7–8.2)
NEUTS SEG # BLD: 17.3 K/UL (ref 1.7–8.2)
NEUTS SEG # BLD: 3.7 K/UL (ref 1.7–8.2)
NEUTS SEG # BLD: 6 K/UL (ref 1.7–8.2)
NEUTS SEG # BLD: 6 K/UL (ref 1.7–8.2)
NEUTS SEG # BLD: 6.3 K/UL (ref 1.7–8.2)
NEUTS SEG # BLD: 6.4 K/UL (ref 1.7–8.2)
NEUTS SEG # BLD: 6.6 K/UL (ref 1.7–8.2)
NEUTS SEG # BLD: 6.8 K/UL (ref 1.7–8.2)
NEUTS SEG # BLD: 7 K/UL (ref 1.7–8.2)
NEUTS SEG # BLD: 7.2 K/UL (ref 1.7–8.2)
NEUTS SEG # BLD: 7.2 K/UL (ref 1.7–8.2)
NEUTS SEG # BLD: 7.7 K/UL (ref 1.7–8.2)
NEUTS SEG NFR BLD: 66 % (ref 43–78)
NEUTS SEG NFR BLD: 68 % (ref 43–78)
NEUTS SEG NFR BLD: 68 % (ref 43–78)
NEUTS SEG NFR BLD: 74 % (ref 43–78)
NEUTS SEG NFR BLD: 74 % (ref 43–78)
NEUTS SEG NFR BLD: 75 % (ref 43–78)
NEUTS SEG NFR BLD: 77 % (ref 43–78)
NEUTS SEG NFR BLD: 80 % (ref 43–78)
NEUTS SEG NFR BLD: 84 % (ref 43–78)
NEUTS SEG NFR BLD: 84 % (ref 43–78)
NEUTS SEG NFR BLD: 85 % (ref 43–78)
NEUTS SEG NFR BLD: 87 % (ref 43–78)
NEUTS SEG NFR BLD: 89 % (ref 43–78)
NEUTS SEG NFR BLD: 89 % (ref 43–78)
NITRITE UR QL STRIP.AUTO: NEGATIVE
NITRITE UR QL STRIP.AUTO: NEGATIVE
NRBC # BLD: 0 K/UL (ref 0–0.2)
O2/TOTAL GAS SETTING VFR VENT: 32 %
O2/TOTAL GAS SETTING VFR VENT: 33 %
O2/TOTAL GAS SETTING VFR VENT: 33 %
O2/TOTAL GAS SETTING VFR VENT: 40 %
OPIATES UR QL: NEGATIVE
OTHER OBSERVATIONS,UCOM: ABNORMAL
OXYHGB MFR BLDA: 95.6 % (ref 94–97)
P-R INTERVAL, ECG05: 148 MS
P-R INTERVAL, ECG05: 154 MS
P-R INTERVAL, ECG05: 160 MS
P-R INTERVAL, ECG05: 164 MS
P-R INTERVAL, ECG05: 178 MS
P-R INTERVAL, ECG05: 210 MS
PCO2 BLD: 47.2 MMHG (ref 35–45)
PCO2 BLD: 49.6 MMHG (ref 35–45)
PCO2 BLD: 54 MMHG (ref 35–45)
PCO2 BLD: 59.3 MMHG (ref 35–45)
PCO2 BLD: 66.2 MMHG (ref 35–45)
PCO2 BLD: 67.5 MMHG (ref 35–45)
PCO2 BLD: 72.6 MMHG (ref 35–45)
PCO2 BLD: 75.2 MMHG (ref 35–45)
PCO2 BLD: 76.5 MMHG (ref 35–45)
PCO2 BLDA: 61 MMHG (ref 35–45)
PCP UR QL: NEGATIVE
PH BLD: 7.32 [PH] (ref 7.35–7.45)
PH BLD: 7.33 [PH] (ref 7.35–7.45)
PH BLD: 7.33 [PH] (ref 7.35–7.45)
PH BLD: 7.34 [PH] (ref 7.35–7.45)
PH BLD: 7.35 [PH] (ref 7.35–7.45)
PH BLD: 7.37 [PH] (ref 7.35–7.45)
PH BLD: 7.44 [PH] (ref 7.35–7.45)
PH BLD: 7.5 [PH] (ref 7.35–7.45)
PH BLD: 7.51 [PH] (ref 7.35–7.45)
PH BLDA: 7.34 [PH] (ref 7.35–7.45)
PH UR STRIP: 5.5 [PH] (ref 5–9)
PH UR STRIP: 7 [PH] (ref 5–9)
PIP ISTAT,IPIP: 12
PLATELET # BLD AUTO: 222 K/UL (ref 150–450)
PLATELET # BLD AUTO: 238 K/UL (ref 150–450)
PLATELET # BLD AUTO: 246 K/UL (ref 150–450)
PLATELET # BLD AUTO: 263 K/UL (ref 150–450)
PLATELET # BLD AUTO: 264 K/UL (ref 150–450)
PLATELET # BLD AUTO: 266 K/UL (ref 150–450)
PLATELET # BLD AUTO: 296 K/UL (ref 150–450)
PLATELET # BLD AUTO: 299 K/UL (ref 150–450)
PLATELET # BLD AUTO: 310 K/UL (ref 150–450)
PLATELET # BLD AUTO: 311 K/UL (ref 150–450)
PLATELET # BLD AUTO: 322 K/UL (ref 150–450)
PLATELET # BLD AUTO: 362 K/UL (ref 150–450)
PLATELET # BLD AUTO: 437 K/UL (ref 150–450)
PLATELET # BLD AUTO: 483 K/UL (ref 150–450)
PLATELET # BLD AUTO: 592 K/UL (ref 150–450)
PMV BLD AUTO: 10 FL (ref 9.4–12.3)
PMV BLD AUTO: 10 FL (ref 9.4–12.3)
PMV BLD AUTO: 10.1 FL (ref 9.4–12.3)
PMV BLD AUTO: 10.1 FL (ref 9.4–12.3)
PMV BLD AUTO: 10.2 FL (ref 9.4–12.3)
PMV BLD AUTO: 10.4 FL (ref 9.4–12.3)
PMV BLD AUTO: 9.6 FL (ref 10.8–14.1)
PMV BLD AUTO: 9.7 FL (ref 9.4–12.3)
PMV BLD AUTO: 9.7 FL (ref 9.4–12.3)
PMV BLD AUTO: 9.8 FL (ref 9.4–12.3)
PMV BLD AUTO: 9.9 FL (ref 9.4–12.3)
PMV BLD AUTO: 9.9 FL (ref 9.4–12.3)
PO2 BLD: 104 MMHG (ref 75–100)
PO2 BLD: 105 MMHG (ref 80–105)
PO2 BLD: 109 MMHG (ref 75–100)
PO2 BLD: 151 MMHG (ref 75–100)
PO2 BLD: 59 MMHG (ref 75–100)
PO2 BLD: 77 MMHG (ref 75–100)
PO2 BLD: 84 MMHG (ref 75–100)
PO2 BLD: 89 MMHG (ref 75–100)
PO2 BLD: 93 MMHG (ref 80–105)
PO2 BLDA: 111 MMHG (ref 80–105)
POTASSIUM SERPL-SCNC: 3 MMOL/L (ref 3.5–5.1)
POTASSIUM SERPL-SCNC: 3 MMOL/L (ref 3.5–5.1)
POTASSIUM SERPL-SCNC: 3.4 MMOL/L (ref 3.5–5.1)
POTASSIUM SERPL-SCNC: 3.4 MMOL/L (ref 3.5–5.1)
POTASSIUM SERPL-SCNC: 3.5 MMOL/L (ref 3.5–5.1)
POTASSIUM SERPL-SCNC: 3.7 MMOL/L (ref 3.5–5.1)
POTASSIUM SERPL-SCNC: 3.7 MMOL/L (ref 3.5–5.1)
POTASSIUM SERPL-SCNC: 4 MMOL/L (ref 3.5–5.1)
POTASSIUM SERPL-SCNC: 4.3 MMOL/L (ref 3.5–5.1)
POTASSIUM SERPL-SCNC: 4.4 MMOL/L (ref 3.5–5.1)
POTASSIUM SERPL-SCNC: 4.4 MMOL/L (ref 3.5–5.1)
POTASSIUM SERPL-SCNC: 4.5 MMOL/L (ref 3.5–5.1)
POTASSIUM SERPL-SCNC: 4.5 MMOL/L (ref 3.5–5.1)
POTASSIUM SERPL-SCNC: 4.6 MMOL/L (ref 3.5–5.1)
POTASSIUM SERPL-SCNC: 4.7 MMOL/L (ref 3.5–5.1)
PROCALCITONIN SERPL-MCNC: 0.1 NG/ML
PROCALCITONIN SERPL-MCNC: 0.1 NG/ML
PROCALCITONIN SERPL-MCNC: 0.3 NG/ML
PROT SERPL-MCNC: 6.1 G/DL (ref 6.3–8.2)
PROT SERPL-MCNC: 6.5 G/DL (ref 6.3–8.2)
PROT SERPL-MCNC: 7.2 G/DL (ref 6.3–8.2)
PROT SERPL-MCNC: 7.7 G/DL (ref 6.3–8.2)
PROT SERPL-MCNC: 8 G/DL (ref 6.3–8.2)
PROT UR STRIP-MCNC: 100 MG/DL
PROT UR STRIP-MCNC: 30 MG/DL
PROTHROMBIN TIME: 12.5 SEC (ref 11.5–14.5)
Q-T INTERVAL, ECG07: 280 MS
Q-T INTERVAL, ECG07: 314 MS
Q-T INTERVAL, ECG07: 346 MS
Q-T INTERVAL, ECG07: 368 MS
Q-T INTERVAL, ECG07: 376 MS
Q-T INTERVAL, ECG07: 400 MS
QRS DURATION, ECG06: 112 MS
QRS DURATION, ECG06: 120 MS
QRS DURATION, ECG06: 122 MS
QRS DURATION, ECG06: 124 MS
QTC CALCULATION (BEZET), ECG08: 408 MS
QTC CALCULATION (BEZET), ECG08: 430 MS
QTC CALCULATION (BEZET), ECG08: 450 MS
QTC CALCULATION (BEZET), ECG08: 458 MS
QTC CALCULATION (BEZET), ECG08: 467 MS
QTC CALCULATION (BEZET), ECG08: 480 MS
RBC # BLD AUTO: 2.31 M/UL (ref 4.05–5.2)
RBC # BLD AUTO: 2.63 M/UL (ref 4.05–5.2)
RBC # BLD AUTO: 2.67 M/UL (ref 4.05–5.2)
RBC # BLD AUTO: 2.86 M/UL (ref 4.05–5.2)
RBC # BLD AUTO: 2.87 M/UL (ref 4.05–5.2)
RBC # BLD AUTO: 2.91 M/UL (ref 4.05–5.2)
RBC # BLD AUTO: 2.95 M/UL (ref 4.05–5.2)
RBC # BLD AUTO: 3 M/UL (ref 4.05–5.2)
RBC # BLD AUTO: 3.13 M/UL (ref 4.05–5.2)
RBC # BLD AUTO: 3.2 M/UL (ref 4.05–5.2)
RBC # BLD AUTO: 3.27 M/UL (ref 4.05–5.2)
RBC # BLD AUTO: 3.51 M/UL (ref 4.05–5.2)
RBC # BLD AUTO: 3.51 M/UL (ref 4.05–5.2)
RBC # BLD AUTO: 3.58 M/UL (ref 4.05–5.2)
RBC # BLD AUTO: 4.36 M/UL (ref 4.05–5.25)
RBC #/AREA URNS HPF: ABNORMAL /HPF
RBC #/AREA URNS HPF: ABNORMAL /HPF
SAO2 % BLD: 90 % (ref 95–98)
SAO2 % BLD: 94 % (ref 95–98)
SAO2 % BLD: 95 % (ref 95–98)
SAO2 % BLD: 95 % (ref 95–98)
SAO2 % BLD: 96 % (ref 95–98)
SAO2 % BLD: 97 % (ref 95–98)
SAO2 % BLD: 97 % (ref 95–98)
SAO2 % BLD: 98 % (ref 92–98.5)
SAO2 % BLD: 99 % (ref 95–98)
SAO2 % BLD: 99 % (ref 95–98)
SERVICE CMNT-IMP: 1
SERVICE CMNT-IMP: ABNORMAL
SERVICE CMNT-IMP: NORMAL
SODIUM SERPL-SCNC: 128 MMOL/L (ref 136–145)
SODIUM SERPL-SCNC: 134 MMOL/L (ref 136–145)
SODIUM SERPL-SCNC: 135 MMOL/L (ref 136–145)
SODIUM SERPL-SCNC: 139 MMOL/L (ref 136–145)
SODIUM SERPL-SCNC: 139 MMOL/L (ref 136–145)
SODIUM SERPL-SCNC: 140 MMOL/L (ref 136–145)
SODIUM SERPL-SCNC: 140 MMOL/L (ref 136–145)
SODIUM SERPL-SCNC: 141 MMOL/L (ref 136–145)
SODIUM SERPL-SCNC: 141 MMOL/L (ref 136–145)
SODIUM SERPL-SCNC: 142 MMOL/L (ref 136–145)
SODIUM SERPL-SCNC: 142 MMOL/L (ref 136–145)
SODIUM SERPL-SCNC: 143 MMOL/L (ref 136–145)
SODIUM SERPL-SCNC: 145 MMOL/L (ref 136–145)
SP GR UR REFRACTOMETRY: 1.01 (ref 1–1.02)
SPECIMEN EXP DATE BLD: NORMAL
SPECIMEN EXP DATE BLD: NORMAL
SPECIMEN TYPE: ABNORMAL
STATUS OF UNIT,%ST: NORMAL
TIBC SERPL-MCNC: 335 UG/DL (ref 250–450)
TROPONIN I BLD-MCNC: 0.12 NG/ML (ref 0.02–0.05)
TROPONIN I SERPL-MCNC: 0.03 NG/ML (ref 0.02–0.05)
TROPONIN I SERPL-MCNC: <0.02 NG/ML (ref 0.02–0.05)
TSH SERPL DL<=0.005 MIU/L-ACNC: 0.52 UIU/ML (ref 0.36–3.74)
UNIT DIVISION, %UDIV: 0
UROBILINOGEN UR QL STRIP.AUTO: 0.2 EU/DL (ref 0.2–1)
UROBILINOGEN UR QL STRIP.AUTO: 1 EU/DL (ref 0.2–1)
VANCOMYCIN TROUGH SERPL-MCNC: 14.3 UG/ML (ref 5–20)
VANCOMYCIN TROUGH SERPL-MCNC: 19.9 UG/ML (ref 5–20)
VENTRICULAR RATE, ECG03: 128 BPM
VENTRICULAR RATE, ECG03: 128 BPM
VENTRICULAR RATE, ECG03: 76 BPM
VENTRICULAR RATE, ECG03: 93 BPM
VENTRICULAR RATE, ECG03: 97 BPM
VENTRICULAR RATE, ECG03: 98 BPM
WBC # BLD AUTO: 10 K/UL (ref 4.3–11.1)
WBC # BLD AUTO: 10.2 K/UL (ref 4.3–11.1)
WBC # BLD AUTO: 10.6 K/UL (ref 4.3–11.1)
WBC # BLD AUTO: 10.7 K/UL (ref 4.3–11.1)
WBC # BLD AUTO: 12.8 K/UL (ref 4.3–11.1)
WBC # BLD AUTO: 13.6 K/UL (ref 4.3–11.1)
WBC # BLD AUTO: 16.9 K/UL (ref 4.3–11.1)
WBC # BLD AUTO: 20.3 K/UL (ref 4.3–11.1)
WBC # BLD AUTO: 4.4 K/UL (ref 4.3–11.1)
WBC # BLD AUTO: 6.7 K/UL (ref 4.3–11.1)
WBC # BLD AUTO: 7.1 K/UL (ref 4.3–11.1)
WBC # BLD AUTO: 7.4 K/UL (ref 4.3–11.1)
WBC # BLD AUTO: 8 K/UL (ref 4.3–11.1)
WBC # BLD AUTO: 8.5 K/UL (ref 4.3–11.1)
WBC # BLD AUTO: 9 K/UL (ref 4.3–11.1)
WBC URNS QL MICRO: ABNORMAL /HPF
WBC URNS QL MICRO: ABNORMAL /HPF

## 2018-01-01 PROCEDURE — 85025 COMPLETE CBC W/AUTO DIFF WBC: CPT | Performed by: EMERGENCY MEDICINE

## 2018-01-01 PROCEDURE — 3331090001 HH PPS REVENUE CREDIT

## 2018-01-01 PROCEDURE — 77030013032 HC MSK BPAP/CPAP FISP -B

## 2018-01-01 PROCEDURE — 3331090002 HH PPS REVENUE DEBIT

## 2018-01-01 PROCEDURE — 83735 ASSAY OF MAGNESIUM: CPT

## 2018-01-01 PROCEDURE — 99285 EMERGENCY DEPT VISIT HI MDM: CPT | Performed by: EMERGENCY MEDICINE

## 2018-01-01 PROCEDURE — 80053 COMPREHEN METABOLIC PANEL: CPT

## 2018-01-01 PROCEDURE — 71045 X-RAY EXAM CHEST 1 VIEW: CPT

## 2018-01-01 PROCEDURE — 87076 CULTURE ANAEROBE IDENT EACH: CPT

## 2018-01-01 PROCEDURE — 74011250637 HC RX REV CODE- 250/637: Performed by: FAMILY MEDICINE

## 2018-01-01 PROCEDURE — 36415 COLL VENOUS BLD VENIPUNCTURE: CPT

## 2018-01-01 PROCEDURE — 97161 PT EVAL LOW COMPLEX 20 MIN: CPT

## 2018-01-01 PROCEDURE — 36592 COLLECT BLOOD FROM PICC: CPT

## 2018-01-01 PROCEDURE — 77010033678 HC OXYGEN DAILY

## 2018-01-01 PROCEDURE — 87205 SMEAR GRAM STAIN: CPT

## 2018-01-01 PROCEDURE — 76450000000

## 2018-01-01 PROCEDURE — 94640 AIRWAY INHALATION TREATMENT: CPT

## 2018-01-01 PROCEDURE — 85610 PROTHROMBIN TIME: CPT

## 2018-01-01 PROCEDURE — 74011000250 HC RX REV CODE- 250: Performed by: INTERNAL MEDICINE

## 2018-01-01 PROCEDURE — 74011000250 HC RX REV CODE- 250: Performed by: FAMILY MEDICINE

## 2018-01-01 PROCEDURE — 65660000000 HC RM CCU STEPDOWN

## 2018-01-01 PROCEDURE — 74011250636 HC RX REV CODE- 250/636: Performed by: FAMILY MEDICINE

## 2018-01-01 PROCEDURE — C1751 CATH, INF, PER/CENT/MIDLINE: HCPCS

## 2018-01-01 PROCEDURE — 74011250637 HC RX REV CODE- 250/637: Performed by: INTERNAL MEDICINE

## 2018-01-01 PROCEDURE — 81003 URINALYSIS AUTO W/O SCOPE: CPT

## 2018-01-01 PROCEDURE — 85018 HEMOGLOBIN: CPT

## 2018-01-01 PROCEDURE — 94660 CPAP INITIATION&MGMT: CPT

## 2018-01-01 PROCEDURE — 74011250636 HC RX REV CODE- 250/636: Performed by: INTERNAL MEDICINE

## 2018-01-01 PROCEDURE — 85025 COMPLETE CBC W/AUTO DIFF WBC: CPT

## 2018-01-01 PROCEDURE — 74011250636 HC RX REV CODE- 250/636: Performed by: EMERGENCY MEDICINE

## 2018-01-01 PROCEDURE — 51798 US URINE CAPACITY MEASURE: CPT

## 2018-01-01 PROCEDURE — 83540 ASSAY OF IRON: CPT

## 2018-01-01 PROCEDURE — 94760 N-INVAS EAR/PLS OXIMETRY 1: CPT

## 2018-01-01 PROCEDURE — 82728 ASSAY OF FERRITIN: CPT

## 2018-01-01 PROCEDURE — 99231 SBSQ HOSP IP/OBS SF/LOW 25: CPT | Performed by: NURSE PRACTITIONER

## 2018-01-01 PROCEDURE — 97110 THERAPEUTIC EXERCISES: CPT

## 2018-01-01 PROCEDURE — 74011250637 HC RX REV CODE- 250/637: Performed by: NURSE PRACTITIONER

## 2018-01-01 PROCEDURE — 85027 COMPLETE CBC AUTOMATED: CPT

## 2018-01-01 PROCEDURE — 65270000029 HC RM PRIVATE

## 2018-01-01 PROCEDURE — 80048 BASIC METABOLIC PNL TOTAL CA: CPT

## 2018-01-01 PROCEDURE — 99232 SBSQ HOSP IP/OBS MODERATE 35: CPT | Performed by: INTERNAL MEDICINE

## 2018-01-01 PROCEDURE — 86901 BLOOD TYPING SEROLOGIC RH(D): CPT

## 2018-01-01 PROCEDURE — 74011000258 HC RX REV CODE- 258: Performed by: FAMILY MEDICINE

## 2018-01-01 PROCEDURE — 96374 THER/PROPH/DIAG INJ IV PUSH: CPT | Performed by: EMERGENCY MEDICINE

## 2018-01-01 PROCEDURE — 74011000258 HC RX REV CODE- 258: Performed by: EMERGENCY MEDICINE

## 2018-01-01 PROCEDURE — 82803 BLOOD GASES ANY COMBINATION: CPT

## 2018-01-01 PROCEDURE — 77030039270 HC TU BLD FLTR CARD -A

## 2018-01-01 PROCEDURE — 93005 ELECTROCARDIOGRAM TRACING: CPT | Performed by: EMERGENCY MEDICINE

## 2018-01-01 PROCEDURE — C8929 TTE W OR WO FOL WCON,DOPPLER: HCPCS

## 2018-01-01 PROCEDURE — 80307 DRUG TEST PRSMV CHEM ANLYZR: CPT

## 2018-01-01 PROCEDURE — 90471 IMMUNIZATION ADMIN: CPT

## 2018-01-01 PROCEDURE — 36430 TRANSFUSION BLD/BLD COMPNT: CPT

## 2018-01-01 PROCEDURE — G0299 HHS/HOSPICE OF RN EA 15 MIN: HCPCS

## 2018-01-01 PROCEDURE — 77030018786 HC NDL GD F/USND BARD -B

## 2018-01-01 PROCEDURE — 96365 THER/PROPH/DIAG IV INF INIT: CPT | Performed by: EMERGENCY MEDICINE

## 2018-01-01 PROCEDURE — 74011000250 HC RX REV CODE- 250: Performed by: EMERGENCY MEDICINE

## 2018-01-01 PROCEDURE — 81003 URINALYSIS AUTO W/O SCOPE: CPT | Performed by: EMERGENCY MEDICINE

## 2018-01-01 PROCEDURE — 77010033711 HC HIGH FLOW OXYGEN

## 2018-01-01 PROCEDURE — 77030012793 HC CIRC VNTLTR FISP -B

## 2018-01-01 PROCEDURE — 84145 PROCALCITONIN (PCT): CPT

## 2018-01-01 PROCEDURE — 77030013140 HC MSK NEB VYRM -A

## 2018-01-01 PROCEDURE — 65610000001 HC ROOM ICU GENERAL

## 2018-01-01 PROCEDURE — 74011636637 HC RX REV CODE- 636/637: Performed by: INTERNAL MEDICINE

## 2018-01-01 PROCEDURE — 36600 WITHDRAWAL OF ARTERIAL BLOOD: CPT

## 2018-01-01 PROCEDURE — 86923 COMPATIBILITY TEST ELECTRIC: CPT

## 2018-01-01 PROCEDURE — 400013 HH SOC

## 2018-01-01 PROCEDURE — 77030019605

## 2018-01-01 PROCEDURE — 74011636637 HC RX REV CODE- 636/637: Performed by: NURSE PRACTITIONER

## 2018-01-01 PROCEDURE — 81001 URINALYSIS AUTO W/SCOPE: CPT

## 2018-01-01 PROCEDURE — 76937 US GUIDE VASCULAR ACCESS: CPT

## 2018-01-01 PROCEDURE — 94640 AIRWAY INHALATION TREATMENT: CPT | Performed by: INTERNAL MEDICINE

## 2018-01-01 PROCEDURE — 82962 GLUCOSE BLOOD TEST: CPT

## 2018-01-01 PROCEDURE — 77030020263 HC SOL INJ SOD CL0.9% LFCR 1000ML

## 2018-01-01 PROCEDURE — 99222 1ST HOSP IP/OBS MODERATE 55: CPT | Performed by: INTERNAL MEDICINE

## 2018-01-01 PROCEDURE — P9016 RBC LEUKOCYTES REDUCED: HCPCS

## 2018-01-01 PROCEDURE — 80053 COMPREHEN METABOLIC PANEL: CPT | Performed by: EMERGENCY MEDICINE

## 2018-01-01 PROCEDURE — 36569 INSJ PICC 5 YR+ W/O IMAGING: CPT | Performed by: FAMILY MEDICINE

## 2018-01-01 PROCEDURE — 83880 ASSAY OF NATRIURETIC PEPTIDE: CPT

## 2018-01-01 PROCEDURE — 97530 THERAPEUTIC ACTIVITIES: CPT

## 2018-01-01 PROCEDURE — 99223 1ST HOSP IP/OBS HIGH 75: CPT | Performed by: INTERNAL MEDICINE

## 2018-01-01 PROCEDURE — 87040 BLOOD CULTURE FOR BACTERIA: CPT

## 2018-01-01 PROCEDURE — 80202 ASSAY OF VANCOMYCIN: CPT

## 2018-01-01 PROCEDURE — 87153 DNA/RNA SEQUENCING: CPT

## 2018-01-01 PROCEDURE — 84484 ASSAY OF TROPONIN QUANT: CPT | Performed by: EMERGENCY MEDICINE

## 2018-01-01 PROCEDURE — 90686 IIV4 VACC NO PRSV 0.5 ML IM: CPT | Performed by: INTERNAL MEDICINE

## 2018-01-01 PROCEDURE — 86920 COMPATIBILITY TEST SPIN: CPT

## 2018-01-01 PROCEDURE — 99233 SBSQ HOSP IP/OBS HIGH 50: CPT | Performed by: INTERNAL MEDICINE

## 2018-01-01 PROCEDURE — 83605 ASSAY OF LACTIC ACID: CPT

## 2018-01-01 PROCEDURE — 86870 RBC ANTIBODY IDENTIFICATION: CPT

## 2018-01-01 PROCEDURE — 84484 ASSAY OF TROPONIN QUANT: CPT

## 2018-01-01 PROCEDURE — 82140 ASSAY OF AMMONIA: CPT

## 2018-01-01 PROCEDURE — 02HV33Z INSERTION OF INFUSION DEVICE INTO SUPERIOR VENA CAVA, PERCUTANEOUS APPROACH: ICD-10-PCS | Performed by: FAMILY MEDICINE

## 2018-01-01 PROCEDURE — 99284 EMERGENCY DEPT VISIT MOD MDM: CPT | Performed by: EMERGENCY MEDICINE

## 2018-01-01 PROCEDURE — 74011250636 HC RX REV CODE- 250/636: Performed by: NURSE PRACTITIONER

## 2018-01-01 PROCEDURE — 74011000302 HC RX REV CODE- 302: Performed by: INTERNAL MEDICINE

## 2018-01-01 PROCEDURE — 30233N1 TRANSFUSION OF NONAUTOLOGOUS RED BLOOD CELLS INTO PERIPHERAL VEIN, PERCUTANEOUS APPROACH: ICD-10-PCS | Performed by: FAMILY MEDICINE

## 2018-01-01 PROCEDURE — 77030005510 HC CATH URETH FOL M BARD -A

## 2018-01-01 PROCEDURE — 94760 N-INVAS EAR/PLS OXIMETRY 1: CPT | Performed by: INTERNAL MEDICINE

## 2018-01-01 PROCEDURE — B518YZA FLUOROSCOPY OF SUPERIOR VENA CAVA USING OTHER CONTRAST, GUIDANCE: ICD-10-PCS | Performed by: FAMILY MEDICINE

## 2018-01-01 PROCEDURE — 86580 TB INTRADERMAL TEST: CPT | Performed by: INTERNAL MEDICINE

## 2018-01-01 PROCEDURE — 87086 URINE CULTURE/COLONY COUNT: CPT

## 2018-01-01 PROCEDURE — 77030032490 HC SLV COMPR SCD KNE COVD -B

## 2018-01-01 PROCEDURE — 3331090003 HH PPS REVENUE ADJ

## 2018-01-01 PROCEDURE — 77030021668 HC NEB PREFIL KT VYRM -A

## 2018-01-01 PROCEDURE — 99221 1ST HOSP IP/OBS SF/LOW 40: CPT | Performed by: NURSE PRACTITIONER

## 2018-01-01 PROCEDURE — 99223 1ST HOSP IP/OBS HIGH 75: CPT | Performed by: NURSE PRACTITIONER

## 2018-01-01 PROCEDURE — 93005 ELECTROCARDIOGRAM TRACING: CPT | Performed by: FAMILY MEDICINE

## 2018-01-01 PROCEDURE — 84443 ASSAY THYROID STIM HORMONE: CPT

## 2018-01-01 PROCEDURE — 70450 CT HEAD/BRAIN W/O DYE: CPT

## 2018-01-01 PROCEDURE — 74011000250 HC RX REV CODE- 250

## 2018-01-01 RX ORDER — IPRATROPIUM BROMIDE AND ALBUTEROL SULFATE 2.5; .5 MG/3ML; MG/3ML
3 SOLUTION RESPIRATORY (INHALATION)
Status: DISCONTINUED | OUTPATIENT
Start: 2018-01-01 | End: 2018-01-01 | Stop reason: HOSPADM

## 2018-01-01 RX ORDER — DOXYCYCLINE 100 MG/1
100 CAPSULE ORAL EVERY 12 HOURS
Status: DISCONTINUED | OUTPATIENT
Start: 2018-01-01 | End: 2018-01-01

## 2018-01-01 RX ORDER — ASPIRIN 81 MG/1
81 TABLET ORAL DAILY
Status: DISCONTINUED | OUTPATIENT
Start: 2018-01-01 | End: 2018-01-01 | Stop reason: HOSPADM

## 2018-01-01 RX ORDER — IBUPROFEN 200 MG
1 TABLET ORAL EVERY 24 HOURS
Status: DISCONTINUED | OUTPATIENT
Start: 2018-01-01 | End: 2018-01-01 | Stop reason: HOSPADM

## 2018-01-01 RX ORDER — SODIUM CHLORIDE 0.9 % (FLUSH) 0.9 %
5-10 SYRINGE (ML) INJECTION AS NEEDED
Status: DISCONTINUED | OUTPATIENT
Start: 2018-01-01 | End: 2018-01-01 | Stop reason: HOSPADM

## 2018-01-01 RX ORDER — FUROSEMIDE 20 MG/1
20 TABLET ORAL DAILY
Status: DISCONTINUED | OUTPATIENT
Start: 2018-01-01 | End: 2018-01-01

## 2018-01-01 RX ORDER — PANTOPRAZOLE SODIUM 40 MG/1
40 TABLET, DELAYED RELEASE ORAL DAILY
Qty: 15 TAB | Refills: 0 | Status: SHIPPED | OUTPATIENT
Start: 2018-01-01

## 2018-01-01 RX ORDER — VANCOMYCIN HYDROCHLORIDE
1250 EVERY 24 HOURS
Status: DISCONTINUED | OUTPATIENT
Start: 2018-01-01 | End: 2018-01-01

## 2018-01-01 RX ORDER — METOPROLOL SUCCINATE 50 MG/1
50 TABLET, EXTENDED RELEASE ORAL DAILY
Status: DISCONTINUED | OUTPATIENT
Start: 2018-01-01 | End: 2018-01-01 | Stop reason: HOSPADM

## 2018-01-01 RX ORDER — LISINOPRIL 5 MG/1
10 TABLET ORAL DAILY
Status: DISCONTINUED | OUTPATIENT
Start: 2018-01-01 | End: 2018-01-01 | Stop reason: HOSPADM

## 2018-01-01 RX ORDER — POTASSIUM CHLORIDE 20 MEQ/1
20 TABLET, EXTENDED RELEASE ORAL 2 TIMES DAILY
Status: COMPLETED | OUTPATIENT
Start: 2018-01-01 | End: 2018-01-01

## 2018-01-01 RX ORDER — SERTRALINE HYDROCHLORIDE 100 MG/1
100 TABLET, FILM COATED ORAL DAILY
Status: DISCONTINUED | OUTPATIENT
Start: 2018-01-01 | End: 2018-01-01 | Stop reason: HOSPADM

## 2018-01-01 RX ORDER — METOPROLOL SUCCINATE 50 MG/1
50 TABLET, EXTENDED RELEASE ORAL DAILY
Status: DISCONTINUED | OUTPATIENT
Start: 2018-01-01 | End: 2018-01-01

## 2018-01-01 RX ORDER — AMOXICILLIN 250 MG
1 CAPSULE ORAL DAILY
Status: DISCONTINUED | OUTPATIENT
Start: 2018-01-01 | End: 2018-01-01

## 2018-01-01 RX ORDER — LORAZEPAM 0.5 MG/1
0.5 TABLET ORAL
Status: DISCONTINUED | OUTPATIENT
Start: 2018-01-01 | End: 2018-01-01 | Stop reason: HOSPADM

## 2018-01-01 RX ORDER — GUAIFENESIN 600 MG/1
1200 TABLET, EXTENDED RELEASE ORAL EVERY 12 HOURS
Status: DISCONTINUED | OUTPATIENT
Start: 2018-01-01 | End: 2018-01-01 | Stop reason: HOSPADM

## 2018-01-01 RX ORDER — LISINOPRIL 5 MG/1
10 TABLET ORAL DAILY
Status: DISCONTINUED | OUTPATIENT
Start: 2018-01-01 | End: 2018-01-01

## 2018-01-01 RX ORDER — SODIUM CHLORIDE 9 MG/ML
250 INJECTION, SOLUTION INTRAVENOUS AS NEEDED
Status: DISCONTINUED | OUTPATIENT
Start: 2018-01-01 | End: 2018-01-01 | Stop reason: HOSPADM

## 2018-01-01 RX ORDER — LORAZEPAM 1 MG/1
1 TABLET ORAL
Status: DISCONTINUED | OUTPATIENT
Start: 2018-01-01 | End: 2018-01-01 | Stop reason: HOSPADM

## 2018-01-01 RX ORDER — IBUPROFEN 200 MG
1 TABLET ORAL
Status: DISCONTINUED | OUTPATIENT
Start: 2018-01-01 | End: 2018-01-01 | Stop reason: HOSPADM

## 2018-01-01 RX ORDER — NALOXONE HYDROCHLORIDE 0.4 MG/ML
0.4 INJECTION, SOLUTION INTRAMUSCULAR; INTRAVENOUS; SUBCUTANEOUS AS NEEDED
Status: DISCONTINUED | OUTPATIENT
Start: 2018-01-01 | End: 2018-01-01 | Stop reason: HOSPADM

## 2018-01-01 RX ORDER — ACETAMINOPHEN 325 MG/1
650 TABLET ORAL
Status: DISCONTINUED | OUTPATIENT
Start: 2018-01-01 | End: 2018-01-01 | Stop reason: HOSPADM

## 2018-01-01 RX ORDER — SODIUM CHLORIDE 0.9 % (FLUSH) 0.9 %
10 SYRINGE (ML) INJECTION EVERY 8 HOURS
Status: DISCONTINUED | OUTPATIENT
Start: 2018-01-01 | End: 2018-01-01 | Stop reason: HOSPADM

## 2018-01-01 RX ORDER — METOPROLOL SUCCINATE 100 MG/1
100 TABLET, EXTENDED RELEASE ORAL DAILY
Qty: 30 TAB | Refills: 1 | Status: SHIPPED | OUTPATIENT
Start: 2018-01-01

## 2018-01-01 RX ORDER — GABAPENTIN 100 MG/1
100 CAPSULE ORAL
Status: DISCONTINUED | OUTPATIENT
Start: 2018-01-01 | End: 2018-01-01 | Stop reason: HOSPADM

## 2018-01-01 RX ORDER — SODIUM CHLORIDE 0.9 % (FLUSH) 0.9 %
5-10 SYRINGE (ML) INJECTION AS NEEDED
Status: DISCONTINUED | OUTPATIENT
Start: 2018-01-01 | End: 2018-01-01

## 2018-01-01 RX ORDER — HEPARIN 100 UNIT/ML
600 SYRINGE INTRAVENOUS AS NEEDED
Status: DISCONTINUED | OUTPATIENT
Start: 2018-01-01 | End: 2018-01-01 | Stop reason: HOSPADM

## 2018-01-01 RX ORDER — BISACODYL 5 MG
5 TABLET, DELAYED RELEASE (ENTERIC COATED) ORAL DAILY PRN
Status: DISCONTINUED | OUTPATIENT
Start: 2018-01-01 | End: 2018-01-01 | Stop reason: HOSPADM

## 2018-01-01 RX ORDER — PANTOPRAZOLE SODIUM 40 MG/1
40 TABLET, DELAYED RELEASE ORAL DAILY
Status: DISCONTINUED | OUTPATIENT
Start: 2018-01-01 | End: 2018-01-01 | Stop reason: HOSPADM

## 2018-01-01 RX ORDER — HYDRALAZINE HYDROCHLORIDE 20 MG/ML
20 INJECTION INTRAMUSCULAR; INTRAVENOUS
Status: DISCONTINUED | OUTPATIENT
Start: 2018-01-01 | End: 2018-01-01 | Stop reason: HOSPADM

## 2018-01-01 RX ORDER — HEPARIN SODIUM 5000 [USP'U]/ML
5000 INJECTION, SOLUTION INTRAVENOUS; SUBCUTANEOUS EVERY 8 HOURS
Status: DISCONTINUED | OUTPATIENT
Start: 2018-01-01 | End: 2018-01-01 | Stop reason: HOSPADM

## 2018-01-01 RX ORDER — DOCUSATE SODIUM 100 MG/1
100 CAPSULE, LIQUID FILLED ORAL 2 TIMES DAILY
Status: DISCONTINUED | OUTPATIENT
Start: 2018-01-01 | End: 2018-01-01 | Stop reason: HOSPADM

## 2018-01-01 RX ORDER — LEVOFLOXACIN 500 MG/1
500 TABLET, FILM COATED ORAL EVERY 24 HOURS
Qty: 2 TAB | Refills: 0 | Status: SHIPPED | OUTPATIENT
Start: 2018-01-01 | End: 2018-01-01

## 2018-01-01 RX ORDER — METOPROLOL TARTRATE 25 MG/1
25 TABLET, FILM COATED ORAL ONCE
Status: COMPLETED | OUTPATIENT
Start: 2018-01-01 | End: 2018-01-01

## 2018-01-01 RX ORDER — IPRATROPIUM BROMIDE AND ALBUTEROL SULFATE 2.5; .5 MG/3ML; MG/3ML
3 SOLUTION RESPIRATORY (INHALATION)
Status: COMPLETED | OUTPATIENT
Start: 2018-01-01 | End: 2018-01-01

## 2018-01-01 RX ORDER — ALBUTEROL SULFATE 0.83 MG/ML
2.5 SOLUTION RESPIRATORY (INHALATION)
Status: DISCONTINUED | OUTPATIENT
Start: 2018-01-01 | End: 2018-01-01 | Stop reason: HOSPADM

## 2018-01-01 RX ORDER — HYDROCODONE BITARTRATE AND ACETAMINOPHEN 10; 325 MG/1; MG/1
1 TABLET ORAL
Status: DISCONTINUED | OUTPATIENT
Start: 2018-01-01 | End: 2018-01-01 | Stop reason: HOSPADM

## 2018-01-01 RX ORDER — HALOPERIDOL 5 MG/ML
2 INJECTION INTRAMUSCULAR
Status: DISCONTINUED | OUTPATIENT
Start: 2018-01-01 | End: 2018-01-01 | Stop reason: SDUPTHER

## 2018-01-01 RX ORDER — ALBUTEROL SULFATE 0.83 MG/ML
10 SOLUTION RESPIRATORY (INHALATION)
Status: COMPLETED | OUTPATIENT
Start: 2018-01-01 | End: 2018-01-01

## 2018-01-01 RX ORDER — IPRATROPIUM BROMIDE AND ALBUTEROL SULFATE 2.5; .5 MG/3ML; MG/3ML
3 SOLUTION RESPIRATORY (INHALATION)
Status: DISCONTINUED | OUTPATIENT
Start: 2018-01-01 | End: 2018-01-01

## 2018-01-01 RX ORDER — ONDANSETRON 2 MG/ML
4 INJECTION INTRAMUSCULAR; INTRAVENOUS
Status: DISCONTINUED | OUTPATIENT
Start: 2018-01-01 | End: 2018-01-01 | Stop reason: HOSPADM

## 2018-01-01 RX ORDER — FUROSEMIDE 10 MG/ML
20 INJECTION INTRAMUSCULAR; INTRAVENOUS ONCE
Status: COMPLETED | OUTPATIENT
Start: 2018-01-01 | End: 2018-01-01

## 2018-01-01 RX ORDER — LANOLIN ALCOHOL/MO/W.PET/CERES
1 CREAM (GRAM) TOPICAL 2 TIMES DAILY WITH MEALS
Status: DISCONTINUED | OUTPATIENT
Start: 2018-01-01 | End: 2018-01-01 | Stop reason: HOSPADM

## 2018-01-01 RX ORDER — LEVOFLOXACIN 5 MG/ML
750 INJECTION, SOLUTION INTRAVENOUS
Status: DISCONTINUED | OUTPATIENT
Start: 2018-01-01 | End: 2018-01-01 | Stop reason: DRUGHIGH

## 2018-01-01 RX ORDER — METOPROLOL SUCCINATE 50 MG/1
100 TABLET, EXTENDED RELEASE ORAL DAILY
Status: DISCONTINUED | OUTPATIENT
Start: 2018-01-01 | End: 2018-01-01 | Stop reason: HOSPADM

## 2018-01-01 RX ORDER — SODIUM CHLORIDE 0.9 % (FLUSH) 0.9 %
20 SYRINGE (ML) INJECTION AS NEEDED
Status: DISCONTINUED | OUTPATIENT
Start: 2018-01-01 | End: 2018-01-01 | Stop reason: HOSPADM

## 2018-01-01 RX ORDER — IBUPROFEN 200 MG
1 TABLET ORAL EVERY 24 HOURS
Qty: 30 PATCH | Refills: 0 | Status: SHIPPED | OUTPATIENT
Start: 2018-01-01 | End: 2018-12-21

## 2018-01-01 RX ORDER — PREDNISONE 20 MG/1
20 TABLET ORAL
Status: DISCONTINUED | OUTPATIENT
Start: 2018-01-01 | End: 2018-01-01 | Stop reason: HOSPADM

## 2018-01-01 RX ORDER — VANCOMYCIN HYDROCHLORIDE
1250 ONCE
Status: COMPLETED | OUTPATIENT
Start: 2018-01-01 | End: 2018-01-01

## 2018-01-01 RX ORDER — SODIUM CHLORIDE 0.9 % (FLUSH) 0.9 %
5-10 SYRINGE (ML) INJECTION EVERY 8 HOURS
Status: DISCONTINUED | OUTPATIENT
Start: 2018-01-01 | End: 2018-01-01 | Stop reason: HOSPADM

## 2018-01-01 RX ORDER — LANOLIN ALCOHOL/MO/W.PET/CERES
325 CREAM (GRAM) TOPICAL 2 TIMES DAILY WITH MEALS
Qty: 60 TAB | Refills: 1 | Status: SHIPPED | OUTPATIENT
Start: 2018-01-01

## 2018-01-01 RX ORDER — HEPARIN 100 UNIT/ML
600 SYRINGE INTRAVENOUS EVERY 8 HOURS
Status: DISCONTINUED | OUTPATIENT
Start: 2018-01-01 | End: 2018-01-01 | Stop reason: HOSPADM

## 2018-01-01 RX ORDER — HALOPERIDOL 5 MG/ML
2 INJECTION INTRAMUSCULAR
Status: DISCONTINUED | OUTPATIENT
Start: 2018-01-01 | End: 2018-01-01 | Stop reason: HOSPADM

## 2018-01-01 RX ORDER — PREDNISONE 20 MG/1
TABLET ORAL
Qty: 30 TAB | Refills: 0 | Status: SHIPPED | OUTPATIENT
Start: 2018-01-01 | End: 2018-01-01

## 2018-01-01 RX ORDER — METOPROLOL SUCCINATE 25 MG/1
50 TABLET, EXTENDED RELEASE ORAL DAILY
Status: DISCONTINUED | OUTPATIENT
Start: 2018-01-01 | End: 2018-01-01

## 2018-01-01 RX ORDER — PREDNISONE 20 MG/1
TABLET ORAL
Qty: 20 TAB | Refills: 0 | Status: SHIPPED | OUTPATIENT
Start: 2018-01-01 | End: 2018-01-01 | Stop reason: SDUPTHER

## 2018-01-01 RX ORDER — SODIUM CHLORIDE 0.9 % (FLUSH) 0.9 %
5-10 SYRINGE (ML) INJECTION EVERY 8 HOURS
Status: DISCONTINUED | OUTPATIENT
Start: 2018-01-01 | End: 2018-01-01

## 2018-01-01 RX ORDER — DIPHENHYDRAMINE HCL 25 MG
25 CAPSULE ORAL
Status: DISCONTINUED | OUTPATIENT
Start: 2018-01-01 | End: 2018-01-01 | Stop reason: HOSPADM

## 2018-01-01 RX ORDER — PANTOPRAZOLE SODIUM 40 MG/1
40 TABLET, DELAYED RELEASE ORAL DAILY
Qty: 5 TAB | Refills: 0 | Status: SHIPPED | OUTPATIENT
Start: 2018-01-01 | End: 2018-01-01

## 2018-01-01 RX ORDER — ENOXAPARIN SODIUM 100 MG/ML
40 INJECTION SUBCUTANEOUS EVERY 24 HOURS
Status: DISCONTINUED | OUTPATIENT
Start: 2018-01-01 | End: 2018-01-01

## 2018-01-01 RX ORDER — PREDNISONE 10 MG/1
20 TABLET ORAL
Qty: 10 TAB | Refills: 0 | Status: SHIPPED | OUTPATIENT
Start: 2018-01-01 | End: 2018-01-01

## 2018-01-01 RX ORDER — METOPROLOL SUCCINATE 25 MG/1
50 TABLET, EXTENDED RELEASE ORAL ONCE
Status: COMPLETED | OUTPATIENT
Start: 2018-01-01 | End: 2018-01-01

## 2018-01-01 RX ORDER — OXYCODONE HYDROCHLORIDE 5 MG/1
5 TABLET ORAL
Status: DISCONTINUED | OUTPATIENT
Start: 2018-01-01 | End: 2018-01-01 | Stop reason: HOSPADM

## 2018-01-01 RX ORDER — LISINOPRIL 5 MG/1
5 TABLET ORAL DAILY
Status: DISCONTINUED | OUTPATIENT
Start: 2018-01-01 | End: 2018-01-01

## 2018-01-01 RX ORDER — LORAZEPAM 1 MG/1
1 TABLET ORAL ONCE
Status: COMPLETED | OUTPATIENT
Start: 2018-01-01 | End: 2018-01-01

## 2018-01-01 RX ORDER — BUDESONIDE 0.5 MG/2ML
500 INHALANT ORAL
Status: DISCONTINUED | OUTPATIENT
Start: 2018-01-01 | End: 2018-01-01 | Stop reason: HOSPADM

## 2018-01-01 RX ORDER — SODIUM CHLORIDE 0.9 % (FLUSH) 0.9 %
10 SYRINGE (ML) INJECTION AS NEEDED
Status: DISCONTINUED | OUTPATIENT
Start: 2018-01-01 | End: 2018-01-01 | Stop reason: HOSPADM

## 2018-01-01 RX ORDER — INSULIN LISPRO 100 [IU]/ML
INJECTION, SOLUTION INTRAVENOUS; SUBCUTANEOUS
Status: DISCONTINUED | OUTPATIENT
Start: 2018-01-01 | End: 2018-01-01

## 2018-01-01 RX ORDER — ENOXAPARIN SODIUM 100 MG/ML
30 INJECTION SUBCUTANEOUS EVERY 24 HOURS
Status: DISCONTINUED | OUTPATIENT
Start: 2018-01-01 | End: 2018-01-01 | Stop reason: HOSPADM

## 2018-01-01 RX ORDER — DIPHENHYDRAMINE HYDROCHLORIDE 50 MG/ML
25 INJECTION, SOLUTION INTRAMUSCULAR; INTRAVENOUS
Status: DISCONTINUED | OUTPATIENT
Start: 2018-01-01 | End: 2018-01-01

## 2018-01-01 RX ORDER — ALBUTEROL SULFATE 0.83 MG/ML
2.5 SOLUTION RESPIRATORY (INHALATION)
Status: DISCONTINUED | OUTPATIENT
Start: 2018-01-01 | End: 2018-01-01

## 2018-01-01 RX ORDER — SODIUM CHLORIDE 9 MG/ML
75 INJECTION, SOLUTION INTRAVENOUS CONTINUOUS
Status: DISCONTINUED | OUTPATIENT
Start: 2018-01-01 | End: 2018-01-01

## 2018-01-01 RX ORDER — HYDRALAZINE HYDROCHLORIDE 20 MG/ML
20 INJECTION INTRAMUSCULAR; INTRAVENOUS ONCE
Status: COMPLETED | OUTPATIENT
Start: 2018-01-01 | End: 2018-01-01

## 2018-01-01 RX ORDER — IBUPROFEN 200 MG
1 TABLET ORAL EVERY 24 HOURS
Qty: 30 PATCH | Refills: 0 | Status: ON HOLD | OUTPATIENT
Start: 2018-01-01 | End: 2018-01-01

## 2018-01-01 RX ORDER — VANCOMYCIN/0.9 % SOD CHLORIDE 1.5G/250ML
1500 PLASTIC BAG, INJECTION (ML) INTRAVENOUS ONCE
Status: COMPLETED | OUTPATIENT
Start: 2018-01-01 | End: 2018-01-01

## 2018-01-01 RX ORDER — METOPROLOL SUCCINATE 25 MG/1
25 TABLET, EXTENDED RELEASE ORAL DAILY
Status: DISCONTINUED | OUTPATIENT
Start: 2018-01-01 | End: 2018-01-01

## 2018-01-01 RX ORDER — HALOPERIDOL 5 MG/ML
2 INJECTION INTRAMUSCULAR
Status: DISCONTINUED | OUTPATIENT
Start: 2018-01-01 | End: 2018-01-01

## 2018-01-01 RX ORDER — FUROSEMIDE 10 MG/ML
40 INJECTION INTRAMUSCULAR; INTRAVENOUS ONCE
Status: COMPLETED | OUTPATIENT
Start: 2018-01-01 | End: 2018-01-01

## 2018-01-01 RX ORDER — ALBUTEROL SULFATE 0.83 MG/ML
SOLUTION RESPIRATORY (INHALATION)
Status: COMPLETED
Start: 2018-01-01 | End: 2018-01-01

## 2018-01-01 RX ORDER — ALBUTEROL SULFATE 0.83 MG/ML
5 SOLUTION RESPIRATORY (INHALATION)
Status: COMPLETED | OUTPATIENT
Start: 2018-01-01 | End: 2018-01-01

## 2018-01-01 RX ORDER — INSULIN LISPRO 100 [IU]/ML
INJECTION, SOLUTION INTRAVENOUS; SUBCUTANEOUS
Status: DISCONTINUED | OUTPATIENT
Start: 2018-01-01 | End: 2018-01-01 | Stop reason: HOSPADM

## 2018-01-01 RX ORDER — SODIUM CHLORIDE 0.9 % (FLUSH) 0.9 %
20 SYRINGE (ML) INJECTION EVERY 8 HOURS
Status: DISCONTINUED | OUTPATIENT
Start: 2018-01-01 | End: 2018-01-01 | Stop reason: HOSPADM

## 2018-01-01 RX ADMIN — HEPARIN SODIUM 5000 UNITS: 5000 INJECTION INTRAVENOUS; SUBCUTANEOUS at 12:42

## 2018-01-01 RX ADMIN — ALBUTEROL SULFATE 2.5 MG: 2.5 SOLUTION RESPIRATORY (INHALATION) at 19:32

## 2018-01-01 RX ADMIN — SERTRALINE HYDROCHLORIDE 100 MG: 100 TABLET ORAL at 09:39

## 2018-01-01 RX ADMIN — SODIUM CHLORIDE, PRESERVATIVE FREE 600 UNITS: 5 INJECTION INTRAVENOUS at 05:52

## 2018-01-01 RX ADMIN — ASPIRIN 81 MG: 81 TABLET, COATED ORAL at 08:27

## 2018-01-01 RX ADMIN — Medication 10 ML: at 05:41

## 2018-01-01 RX ADMIN — IPRATROPIUM BROMIDE AND ALBUTEROL SULFATE 3 ML: .5; 3 SOLUTION RESPIRATORY (INHALATION) at 17:11

## 2018-01-01 RX ADMIN — IPRATROPIUM BROMIDE AND ALBUTEROL SULFATE 3 ML: .5; 3 SOLUTION RESPIRATORY (INHALATION) at 20:31

## 2018-01-01 RX ADMIN — PIPERACILLIN SODIUM,TAZOBACTAM SODIUM 4.5 G: 4; .5 INJECTION, POWDER, FOR SOLUTION INTRAVENOUS at 12:35

## 2018-01-01 RX ADMIN — ALBUTEROL SULFATE 2.5 MG: 2.5 SOLUTION RESPIRATORY (INHALATION) at 16:00

## 2018-01-01 RX ADMIN — METHYLPREDNISOLONE SODIUM SUCCINATE 20 MG: 40 INJECTION, POWDER, FOR SOLUTION INTRAMUSCULAR; INTRAVENOUS at 09:21

## 2018-01-01 RX ADMIN — ENOXAPARIN SODIUM 30 MG: 100 INJECTION SUBCUTANEOUS at 21:32

## 2018-01-01 RX ADMIN — BUDESONIDE 500 MCG: 0.5 INHALANT RESPIRATORY (INHALATION) at 07:20

## 2018-01-01 RX ADMIN — FERROUS SULFATE TAB 325 MG (65 MG ELEMENTAL FE) 325 MG: 325 (65 FE) TAB at 16:31

## 2018-01-01 RX ADMIN — INFLUENZA VIRUS VACCINE 0.5 ML: 15; 15; 15; 15 SUSPENSION INTRAMUSCULAR at 15:11

## 2018-01-01 RX ADMIN — IPRATROPIUM BROMIDE AND ALBUTEROL SULFATE 3 ML: .5; 3 SOLUTION RESPIRATORY (INHALATION) at 14:57

## 2018-01-01 RX ADMIN — ALBUTEROL SULFATE 2.5 MG: 2.5 SOLUTION RESPIRATORY (INHALATION) at 11:56

## 2018-01-01 RX ADMIN — BUDESONIDE 500 MCG: 0.5 INHALANT RESPIRATORY (INHALATION) at 20:38

## 2018-01-01 RX ADMIN — BUDESONIDE 500 MCG: 0.5 INHALANT RESPIRATORY (INHALATION) at 08:08

## 2018-01-01 RX ADMIN — GABAPENTIN 100 MG: 100 CAPSULE ORAL at 21:01

## 2018-01-01 RX ADMIN — METOPROLOL TARTRATE 25 MG: 25 TABLET ORAL at 21:03

## 2018-01-01 RX ADMIN — BUDESONIDE 500 MCG: 0.5 INHALANT RESPIRATORY (INHALATION) at 07:39

## 2018-01-01 RX ADMIN — Medication 5 ML: at 05:28

## 2018-01-01 RX ADMIN — SODIUM CHLORIDE, PRESERVATIVE FREE 600 UNITS: 5 INJECTION INTRAVENOUS at 22:02

## 2018-01-01 RX ADMIN — GUAIFENESIN 1200 MG: 600 TABLET, EXTENDED RELEASE ORAL at 09:35

## 2018-01-01 RX ADMIN — INSULIN LISPRO 6 UNITS: 100 INJECTION, SOLUTION INTRAVENOUS; SUBCUTANEOUS at 11:43

## 2018-01-01 RX ADMIN — ALBUTEROL SULFATE 2.5 MG: 2.5 SOLUTION RESPIRATORY (INHALATION) at 23:43

## 2018-01-01 RX ADMIN — Medication 10 ML: at 14:57

## 2018-01-01 RX ADMIN — Medication 10 ML: at 05:32

## 2018-01-01 RX ADMIN — ALBUTEROL SULFATE 2.5 MG: 2.5 SOLUTION RESPIRATORY (INHALATION) at 12:00

## 2018-01-01 RX ADMIN — IPRATROPIUM BROMIDE AND ALBUTEROL SULFATE 3 ML: .5; 3 SOLUTION RESPIRATORY (INHALATION) at 07:23

## 2018-01-01 RX ADMIN — Medication 10 ML: at 13:01

## 2018-01-01 RX ADMIN — SODIUM CHLORIDE, PRESERVATIVE FREE 600 UNITS: 5 INJECTION INTRAVENOUS at 17:36

## 2018-01-01 RX ADMIN — METHYLPREDNISOLONE SODIUM SUCCINATE 40 MG: 40 INJECTION, POWDER, FOR SOLUTION INTRAMUSCULAR; INTRAVENOUS at 11:43

## 2018-01-01 RX ADMIN — ALBUTEROL SULFATE 2.5 MG: 2.5 SOLUTION RESPIRATORY (INHALATION) at 08:25

## 2018-01-01 RX ADMIN — LEVOFLOXACIN 750 MG: 5 INJECTION, SOLUTION INTRAVENOUS at 11:12

## 2018-01-01 RX ADMIN — TIOTROPIUM BROMIDE 18 MCG: 18 CAPSULE ORAL; RESPIRATORY (INHALATION) at 07:31

## 2018-01-01 RX ADMIN — ALBUTEROL SULFATE 2.5 MG: 2.5 SOLUTION RESPIRATORY (INHALATION) at 20:22

## 2018-01-01 RX ADMIN — GUAIFENESIN 1200 MG: 600 TABLET, EXTENDED RELEASE ORAL at 08:38

## 2018-01-01 RX ADMIN — OXYCODONE HYDROCHLORIDE 5 MG: 5 TABLET ORAL at 13:12

## 2018-01-01 RX ADMIN — METOPROLOL SUCCINATE 50 MG: 50 TABLET, EXTENDED RELEASE ORAL at 08:27

## 2018-01-01 RX ADMIN — LISINOPRIL 5 MG: 5 TABLET ORAL at 08:32

## 2018-01-01 RX ADMIN — GUAIFENESIN 1200 MG: 600 TABLET, EXTENDED RELEASE ORAL at 08:30

## 2018-01-01 RX ADMIN — INSULIN LISPRO 2 UNITS: 100 INJECTION, SOLUTION INTRAVENOUS; SUBCUTANEOUS at 21:42

## 2018-01-01 RX ADMIN — ALBUTEROL SULFATE 2.5 MG: 2.5 SOLUTION RESPIRATORY (INHALATION) at 04:21

## 2018-01-01 RX ADMIN — METHYLPREDNISOLONE SODIUM SUCCINATE 40 MG: 40 INJECTION, POWDER, FOR SOLUTION INTRAMUSCULAR; INTRAVENOUS at 21:17

## 2018-01-01 RX ADMIN — Medication 10 ML: at 21:22

## 2018-01-01 RX ADMIN — METHYLPREDNISOLONE SODIUM SUCCINATE 40 MG: 40 INJECTION, POWDER, FOR SOLUTION INTRAMUSCULAR; INTRAVENOUS at 13:42

## 2018-01-01 RX ADMIN — BUDESONIDE 500 MCG: 0.5 INHALANT RESPIRATORY (INHALATION) at 08:24

## 2018-01-01 RX ADMIN — IPRATROPIUM BROMIDE AND ALBUTEROL SULFATE 3 ML: .5; 3 SOLUTION RESPIRATORY (INHALATION) at 15:32

## 2018-01-01 RX ADMIN — METOPROLOL SUCCINATE 50 MG: 50 TABLET, EXTENDED RELEASE ORAL at 08:23

## 2018-01-01 RX ADMIN — Medication 10 ML: at 16:36

## 2018-01-01 RX ADMIN — VANCOMYCIN HYDROCHLORIDE 750 MG: 10 INJECTION, POWDER, LYOPHILIZED, FOR SOLUTION INTRAVENOUS at 18:01

## 2018-01-01 RX ADMIN — IPRATROPIUM BROMIDE AND ALBUTEROL SULFATE 3 ML: .5; 3 SOLUTION RESPIRATORY (INHALATION) at 21:07

## 2018-01-01 RX ADMIN — PIPERACILLIN SODIUM,TAZOBACTAM SODIUM 4.5 G: 4; .5 INJECTION, POWDER, FOR SOLUTION INTRAVENOUS at 21:10

## 2018-01-01 RX ADMIN — IPRATROPIUM BROMIDE AND ALBUTEROL SULFATE 3 ML: .5; 3 SOLUTION RESPIRATORY (INHALATION) at 08:08

## 2018-01-01 RX ADMIN — ALBUTEROL SULFATE 2.5 MG: 2.5 SOLUTION RESPIRATORY (INHALATION) at 17:01

## 2018-01-01 RX ADMIN — HEPARIN SODIUM 5000 UNITS: 5000 INJECTION INTRAVENOUS; SUBCUTANEOUS at 06:10

## 2018-01-01 RX ADMIN — METOPROLOL SUCCINATE 50 MG: 25 TABLET, EXTENDED RELEASE ORAL at 08:42

## 2018-01-01 RX ADMIN — IPRATROPIUM BROMIDE AND ALBUTEROL SULFATE 3 ML: .5; 3 SOLUTION RESPIRATORY (INHALATION) at 20:38

## 2018-01-01 RX ADMIN — BUDESONIDE 500 MCG: 0.5 INHALANT RESPIRATORY (INHALATION) at 08:04

## 2018-01-01 RX ADMIN — PIPERACILLIN SODIUM,TAZOBACTAM SODIUM 4.5 G: 4; .5 INJECTION, POWDER, FOR SOLUTION INTRAVENOUS at 23:30

## 2018-01-01 RX ADMIN — Medication 10 ML: at 05:27

## 2018-01-01 RX ADMIN — Medication 5 ML: at 13:02

## 2018-01-01 RX ADMIN — METOPROLOL SUCCINATE 50 MG: 25 TABLET, EXTENDED RELEASE ORAL at 14:53

## 2018-01-01 RX ADMIN — Medication 10 ML: at 13:53

## 2018-01-01 RX ADMIN — Medication 20 ML: at 17:36

## 2018-01-01 RX ADMIN — ALBUTEROL SULFATE 2.5 MG: 2.5 SOLUTION RESPIRATORY (INHALATION) at 06:09

## 2018-01-01 RX ADMIN — Medication 10 ML: at 06:11

## 2018-01-01 RX ADMIN — METHYLPREDNISOLONE SODIUM SUCCINATE 40 MG: 40 INJECTION, POWDER, FOR SOLUTION INTRAMUSCULAR; INTRAVENOUS at 17:00

## 2018-01-01 RX ADMIN — PERFLUTREN 1 ML: 6.52 INJECTION, SUSPENSION INTRAVENOUS at 09:00

## 2018-01-01 RX ADMIN — Medication 10 ML: at 13:43

## 2018-01-01 RX ADMIN — ALBUTEROL SULFATE 2.5 MG: 2.5 SOLUTION RESPIRATORY (INHALATION) at 07:27

## 2018-01-01 RX ADMIN — METHYLPREDNISOLONE SODIUM SUCCINATE 40 MG: 40 INJECTION, POWDER, FOR SOLUTION INTRAMUSCULAR; INTRAVENOUS at 14:24

## 2018-01-01 RX ADMIN — Medication 10 ML: at 22:55

## 2018-01-01 RX ADMIN — IPRATROPIUM BROMIDE AND ALBUTEROL SULFATE 3 ML: .5; 3 SOLUTION RESPIRATORY (INHALATION) at 11:08

## 2018-01-01 RX ADMIN — VANCOMYCIN HYDROCHLORIDE 1250 MG: 10 INJECTION, POWDER, LYOPHILIZED, FOR SOLUTION INTRAVENOUS at 17:17

## 2018-01-01 RX ADMIN — METOPROLOL SUCCINATE 100 MG: 50 TABLET, EXTENDED RELEASE ORAL at 10:29

## 2018-01-01 RX ADMIN — METHYLPREDNISOLONE SODIUM SUCCINATE 40 MG: 40 INJECTION, POWDER, FOR SOLUTION INTRAMUSCULAR; INTRAVENOUS at 00:13

## 2018-01-01 RX ADMIN — METHYLPREDNISOLONE SODIUM SUCCINATE 40 MG: 40 INJECTION, POWDER, FOR SOLUTION INTRAMUSCULAR; INTRAVENOUS at 05:32

## 2018-01-01 RX ADMIN — Medication 5 ML: at 21:16

## 2018-01-01 RX ADMIN — LISINOPRIL 10 MG: 5 TABLET ORAL at 12:34

## 2018-01-01 RX ADMIN — ALBUTEROL SULFATE 5 MG: 2.5 SOLUTION RESPIRATORY (INHALATION) at 17:05

## 2018-01-01 RX ADMIN — POTASSIUM CHLORIDE 20 MEQ: 20 TABLET, EXTENDED RELEASE ORAL at 17:12

## 2018-01-01 RX ADMIN — Medication 10 ML: at 21:12

## 2018-01-01 RX ADMIN — HEPARIN SODIUM 5000 UNITS: 5000 INJECTION INTRAVENOUS; SUBCUTANEOUS at 17:11

## 2018-01-01 RX ADMIN — ALBUTEROL SULFATE 2.5 MG: 2.5 SOLUTION RESPIRATORY (INHALATION) at 19:52

## 2018-01-01 RX ADMIN — FUROSEMIDE 40 MG: 10 INJECTION, SOLUTION INTRAMUSCULAR; INTRAVENOUS at 13:39

## 2018-01-01 RX ADMIN — METOPROLOL TARTRATE 25 MG: 25 TABLET ORAL at 12:48

## 2018-01-01 RX ADMIN — IPRATROPIUM BROMIDE AND ALBUTEROL SULFATE 3 ML: .5; 3 SOLUTION RESPIRATORY (INHALATION) at 15:59

## 2018-01-01 RX ADMIN — SERTRALINE 100 MG: 100 TABLET, FILM COATED ORAL at 09:00

## 2018-01-01 RX ADMIN — METHYLPREDNISOLONE SODIUM SUCCINATE 40 MG: 40 INJECTION, POWDER, FOR SOLUTION INTRAMUSCULAR; INTRAVENOUS at 00:02

## 2018-01-01 RX ADMIN — GABAPENTIN 100 MG: 100 CAPSULE ORAL at 22:25

## 2018-01-01 RX ADMIN — HEPARIN SODIUM 5000 UNITS: 5000 INJECTION INTRAVENOUS; SUBCUTANEOUS at 06:34

## 2018-01-01 RX ADMIN — ALBUTEROL SULFATE 2.5 MG: 2.5 SOLUTION RESPIRATORY (INHALATION) at 07:31

## 2018-01-01 RX ADMIN — PIPERACILLIN SODIUM,TAZOBACTAM SODIUM 4.5 G: 4; .5 INJECTION, POWDER, FOR SOLUTION INTRAVENOUS at 13:00

## 2018-01-01 RX ADMIN — IPRATROPIUM BROMIDE AND ALBUTEROL SULFATE 3 ML: .5; 3 SOLUTION RESPIRATORY (INHALATION) at 20:28

## 2018-01-01 RX ADMIN — Medication 10 ML: at 05:40

## 2018-01-01 RX ADMIN — METHYLPREDNISOLONE SODIUM SUCCINATE 20 MG: 40 INJECTION, POWDER, FOR SOLUTION INTRAMUSCULAR; INTRAVENOUS at 21:01

## 2018-01-01 RX ADMIN — PIPERACILLIN SODIUM,TAZOBACTAM SODIUM 4.5 G: 4; .5 INJECTION, POWDER, FOR SOLUTION INTRAVENOUS at 11:33

## 2018-01-01 RX ADMIN — HALOPERIDOL LACTATE 2 MG: 5 INJECTION, SOLUTION INTRAMUSCULAR at 01:09

## 2018-01-01 RX ADMIN — Medication 10 ML: at 21:42

## 2018-01-01 RX ADMIN — Medication 5 ML: at 06:25

## 2018-01-01 RX ADMIN — ASPIRIN 81 MG: 81 TABLET, COATED ORAL at 08:38

## 2018-01-01 RX ADMIN — METOPROLOL SUCCINATE 50 MG: 50 TABLET, EXTENDED RELEASE ORAL at 11:14

## 2018-01-01 RX ADMIN — SERTRALINE HYDROCHLORIDE 100 MG: 100 TABLET ORAL at 10:10

## 2018-01-01 RX ADMIN — TIOTROPIUM BROMIDE 18 MCG: 18 CAPSULE ORAL; RESPIRATORY (INHALATION) at 08:05

## 2018-01-01 RX ADMIN — HEPARIN SODIUM 5000 UNITS: 5000 INJECTION INTRAVENOUS; SUBCUTANEOUS at 05:23

## 2018-01-01 RX ADMIN — METOPROLOL SUCCINATE 100 MG: 50 TABLET, EXTENDED RELEASE ORAL at 08:35

## 2018-01-01 RX ADMIN — IPRATROPIUM BROMIDE AND ALBUTEROL SULFATE 3 ML: .5; 3 SOLUTION RESPIRATORY (INHALATION) at 08:38

## 2018-01-01 RX ADMIN — METHYLPREDNISOLONE SODIUM SUCCINATE 125 MG: 125 INJECTION, POWDER, FOR SOLUTION INTRAMUSCULAR; INTRAVENOUS at 16:07

## 2018-01-01 RX ADMIN — Medication 20 ML: at 22:05

## 2018-01-01 RX ADMIN — DOXYCYCLINE HYCLATE 100 MG: 100 CAPSULE ORAL at 08:27

## 2018-01-01 RX ADMIN — METOPROLOL SUCCINATE 50 MG: 50 TABLET, EXTENDED RELEASE ORAL at 10:12

## 2018-01-01 RX ADMIN — Medication 5 ML: at 14:24

## 2018-01-01 RX ADMIN — BUDESONIDE 500 MCG: 0.5 INHALANT RESPIRATORY (INHALATION) at 20:28

## 2018-01-01 RX ADMIN — HEPARIN SODIUM 5000 UNITS: 5000 INJECTION INTRAVENOUS; SUBCUTANEOUS at 21:10

## 2018-01-01 RX ADMIN — BUDESONIDE 500 MCG: 0.5 INHALANT RESPIRATORY (INHALATION) at 20:31

## 2018-01-01 RX ADMIN — FERROUS SULFATE TAB 325 MG (65 MG ELEMENTAL FE) 325 MG: 325 (65 FE) TAB at 18:15

## 2018-01-01 RX ADMIN — BUDESONIDE 500 MCG: 0.5 INHALANT RESPIRATORY (INHALATION) at 20:15

## 2018-01-01 RX ADMIN — GABAPENTIN 100 MG: 100 CAPSULE ORAL at 22:45

## 2018-01-01 RX ADMIN — SODIUM CHLORIDE, PRESERVATIVE FREE 600 UNITS: 5 INJECTION INTRAVENOUS at 05:54

## 2018-01-01 RX ADMIN — Medication 10 ML: at 05:15

## 2018-01-01 RX ADMIN — METHYLPREDNISOLONE SODIUM SUCCINATE 30 MG: 40 INJECTION, POWDER, FOR SOLUTION INTRAMUSCULAR; INTRAVENOUS at 09:02

## 2018-01-01 RX ADMIN — PIPERACILLIN SODIUM,TAZOBACTAM SODIUM 4.5 G: 4; .5 INJECTION, POWDER, FOR SOLUTION INTRAVENOUS at 09:36

## 2018-01-01 RX ADMIN — Medication 20 ML: at 20:12

## 2018-01-01 RX ADMIN — Medication 10 ML: at 22:42

## 2018-01-01 RX ADMIN — Medication 20 ML: at 22:55

## 2018-01-01 RX ADMIN — PIPERACILLIN SODIUM,TAZOBACTAM SODIUM 4.5 G: 4; .5 INJECTION, POWDER, FOR SOLUTION INTRAVENOUS at 06:40

## 2018-01-01 RX ADMIN — DOXYCYCLINE HYCLATE 100 MG: 100 CAPSULE ORAL at 21:16

## 2018-01-01 RX ADMIN — VANCOMYCIN HYDROCHLORIDE 1000 MG: 1 INJECTION, POWDER, LYOPHILIZED, FOR SOLUTION INTRAVENOUS at 12:33

## 2018-01-01 RX ADMIN — SERTRALINE 100 MG: 100 TABLET, FILM COATED ORAL at 09:01

## 2018-01-01 RX ADMIN — BUDESONIDE 500 MCG: 0.5 INHALANT RESPIRATORY (INHALATION) at 20:22

## 2018-01-01 RX ADMIN — SERTRALINE 100 MG: 100 TABLET, FILM COATED ORAL at 09:21

## 2018-01-01 RX ADMIN — PIPERACILLIN SODIUM,TAZOBACTAM SODIUM 4.5 G: 4; .5 INJECTION, POWDER, FOR SOLUTION INTRAVENOUS at 17:10

## 2018-01-01 RX ADMIN — IPRATROPIUM BROMIDE AND ALBUTEROL SULFATE 3 ML: .5; 3 SOLUTION RESPIRATORY (INHALATION) at 14:37

## 2018-01-01 RX ADMIN — BUDESONIDE 500 MCG: 0.5 INHALANT RESPIRATORY (INHALATION) at 08:38

## 2018-01-01 RX ADMIN — FERROUS SULFATE TAB 325 MG (65 MG ELEMENTAL FE) 325 MG: 325 (65 FE) TAB at 08:35

## 2018-01-01 RX ADMIN — GABAPENTIN 100 MG: 100 CAPSULE ORAL at 21:11

## 2018-01-01 RX ADMIN — IPRATROPIUM BROMIDE AND ALBUTEROL SULFATE 3 ML: .5; 3 SOLUTION RESPIRATORY (INHALATION) at 07:17

## 2018-01-01 RX ADMIN — IPRATROPIUM BROMIDE AND ALBUTEROL SULFATE 3 ML: .5; 3 SOLUTION RESPIRATORY (INHALATION) at 08:16

## 2018-01-01 RX ADMIN — Medication 10 ML: at 13:40

## 2018-01-01 RX ADMIN — LISINOPRIL 5 MG: 5 TABLET ORAL at 09:01

## 2018-01-01 RX ADMIN — POTASSIUM CHLORIDE 20 MEQ: 20 TABLET, EXTENDED RELEASE ORAL at 18:18

## 2018-01-01 RX ADMIN — METOPROLOL SUCCINATE 50 MG: 50 TABLET, EXTENDED RELEASE ORAL at 09:35

## 2018-01-01 RX ADMIN — Medication 20 ML: at 05:28

## 2018-01-01 RX ADMIN — Medication 10 ML: at 13:41

## 2018-01-01 RX ADMIN — ENOXAPARIN SODIUM 40 MG: 40 INJECTION, SOLUTION INTRAVENOUS; SUBCUTANEOUS at 21:16

## 2018-01-01 RX ADMIN — ALBUTEROL SULFATE 2.5 MG: 2.5 SOLUTION RESPIRATORY (INHALATION) at 07:39

## 2018-01-01 RX ADMIN — METHYLPREDNISOLONE SODIUM SUCCINATE 40 MG: 40 INJECTION, POWDER, FOR SOLUTION INTRAMUSCULAR; INTRAVENOUS at 21:22

## 2018-01-01 RX ADMIN — Medication 20 ML: at 09:58

## 2018-01-01 RX ADMIN — Medication 10 ML: at 12:35

## 2018-01-01 RX ADMIN — PIPERACILLIN SODIUM,TAZOBACTAM SODIUM 4.5 G: 4; .5 INJECTION, POWDER, FOR SOLUTION INTRAVENOUS at 15:36

## 2018-01-01 RX ADMIN — ALBUTEROL SULFATE 2.5 MG: 2.5 SOLUTION RESPIRATORY (INHALATION) at 15:56

## 2018-01-01 RX ADMIN — ALBUTEROL SULFATE 2.5 MG: 2.5 SOLUTION RESPIRATORY (INHALATION) at 23:50

## 2018-01-01 RX ADMIN — IPRATROPIUM BROMIDE AND ALBUTEROL SULFATE 3 ML: .5; 3 SOLUTION RESPIRATORY (INHALATION) at 01:21

## 2018-01-01 RX ADMIN — ASPIRIN 81 MG: 81 TABLET, COATED ORAL at 10:12

## 2018-01-01 RX ADMIN — ALBUTEROL SULFATE 2.5 MG: 2.5 SOLUTION RESPIRATORY (INHALATION) at 11:23

## 2018-01-01 RX ADMIN — HYDRALAZINE HYDROCHLORIDE 20 MG: 20 INJECTION INTRAMUSCULAR; INTRAVENOUS at 09:35

## 2018-01-01 RX ADMIN — ALBUTEROL SULFATE 2.5 MG: 2.5 SOLUTION RESPIRATORY (INHALATION) at 03:40

## 2018-01-01 RX ADMIN — Medication 5 ML: at 21:11

## 2018-01-01 RX ADMIN — PIPERACILLIN SODIUM,TAZOBACTAM SODIUM 4.5 G: 4; .5 INJECTION, POWDER, FOR SOLUTION INTRAVENOUS at 05:20

## 2018-01-01 RX ADMIN — METHYLPREDNISOLONE SODIUM SUCCINATE 40 MG: 40 INJECTION, POWDER, FOR SOLUTION INTRAMUSCULAR; INTRAVENOUS at 14:57

## 2018-01-01 RX ADMIN — HEPARIN SODIUM 5000 UNITS: 5000 INJECTION INTRAVENOUS; SUBCUTANEOUS at 05:20

## 2018-01-01 RX ADMIN — GUAIFENESIN 1200 MG: 600 TABLET, EXTENDED RELEASE ORAL at 21:32

## 2018-01-01 RX ADMIN — BUDESONIDE 500 MCG: 0.5 INHALANT RESPIRATORY (INHALATION) at 07:30

## 2018-01-01 RX ADMIN — Medication 10 ML: at 06:37

## 2018-01-01 RX ADMIN — VANCOMYCIN HYDROCHLORIDE 750 MG: 10 INJECTION, POWDER, LYOPHILIZED, FOR SOLUTION INTRAVENOUS at 18:10

## 2018-01-01 RX ADMIN — Medication 20 ML: at 21:02

## 2018-01-01 RX ADMIN — PANTOPRAZOLE SODIUM 40 MG: 40 TABLET, DELAYED RELEASE ORAL at 09:21

## 2018-01-01 RX ADMIN — ALBUTEROL SULFATE 2.5 MG: 2.5 SOLUTION RESPIRATORY (INHALATION) at 19:17

## 2018-01-01 RX ADMIN — LORAZEPAM 1 MG: 1 TABLET ORAL at 00:02

## 2018-01-01 RX ADMIN — SERTRALINE HYDROCHLORIDE 100 MG: 100 TABLET ORAL at 09:36

## 2018-01-01 RX ADMIN — Medication 10 ML: at 22:22

## 2018-01-01 RX ADMIN — Medication 10 ML: at 17:51

## 2018-01-01 RX ADMIN — METOPROLOL SUCCINATE 50 MG: 50 TABLET, EXTENDED RELEASE ORAL at 10:08

## 2018-01-01 RX ADMIN — METOPROLOL SUCCINATE 50 MG: 50 TABLET, EXTENDED RELEASE ORAL at 08:38

## 2018-01-01 RX ADMIN — METHYLPREDNISOLONE SODIUM SUCCINATE 20 MG: 40 INJECTION, POWDER, FOR SOLUTION INTRAMUSCULAR; INTRAVENOUS at 21:55

## 2018-01-01 RX ADMIN — ASPIRIN 81 MG: 81 TABLET, COATED ORAL at 09:01

## 2018-01-01 RX ADMIN — Medication 10 ML: at 21:02

## 2018-01-01 RX ADMIN — IPRATROPIUM BROMIDE AND ALBUTEROL SULFATE 3 ML: .5; 3 SOLUTION RESPIRATORY (INHALATION) at 23:38

## 2018-01-01 RX ADMIN — HEPARIN SODIUM 5000 UNITS: 5000 INJECTION INTRAVENOUS; SUBCUTANEOUS at 05:58

## 2018-01-01 RX ADMIN — Medication 10 ML: at 05:23

## 2018-01-01 RX ADMIN — BUDESONIDE 500 MCG: 0.5 INHALANT RESPIRATORY (INHALATION) at 08:23

## 2018-01-01 RX ADMIN — BUDESONIDE 500 MCG: 0.5 INHALANT RESPIRATORY (INHALATION) at 19:32

## 2018-01-01 RX ADMIN — BUDESONIDE 500 MCG: 0.5 INHALANT RESPIRATORY (INHALATION) at 19:37

## 2018-01-01 RX ADMIN — Medication 10 ML: at 21:11

## 2018-01-01 RX ADMIN — METOPROLOL SUCCINATE 50 MG: 50 TABLET, EXTENDED RELEASE ORAL at 09:39

## 2018-01-01 RX ADMIN — HEPARIN SODIUM 5000 UNITS: 5000 INJECTION INTRAVENOUS; SUBCUTANEOUS at 12:58

## 2018-01-01 RX ADMIN — Medication 10 ML: at 05:52

## 2018-01-01 RX ADMIN — BUDESONIDE 500 MCG: 0.5 INHALANT RESPIRATORY (INHALATION) at 18:40

## 2018-01-01 RX ADMIN — ENOXAPARIN SODIUM 40 MG: 40 INJECTION, SOLUTION INTRAVENOUS; SUBCUTANEOUS at 08:35

## 2018-01-01 RX ADMIN — LISINOPRIL 10 MG: 5 TABLET ORAL at 05:39

## 2018-01-01 RX ADMIN — ASPIRIN 81 MG: 81 TABLET, COATED ORAL at 11:14

## 2018-01-01 RX ADMIN — Medication 10 ML: at 22:01

## 2018-01-01 RX ADMIN — PIPERACILLIN SODIUM,TAZOBACTAM SODIUM 4.5 G: 4; .5 INJECTION, POWDER, FOR SOLUTION INTRAVENOUS at 06:10

## 2018-01-01 RX ADMIN — Medication 10 ML: at 17:36

## 2018-01-01 RX ADMIN — PIPERACILLIN SODIUM,TAZOBACTAM SODIUM 4.5 G: 4; .5 INJECTION, POWDER, FOR SOLUTION INTRAVENOUS at 08:29

## 2018-01-01 RX ADMIN — IPRATROPIUM BROMIDE AND ALBUTEROL SULFATE 3 ML: .5; 3 SOLUTION RESPIRATORY (INHALATION) at 13:06

## 2018-01-01 RX ADMIN — SERTRALINE HYDROCHLORIDE 100 MG: 100 TABLET ORAL at 08:27

## 2018-01-01 RX ADMIN — ALBUTEROL SULFATE 2.5 MG: 2.5 SOLUTION RESPIRATORY (INHALATION) at 04:10

## 2018-01-01 RX ADMIN — HEPARIN SODIUM 5000 UNITS: 5000 INJECTION INTRAVENOUS; SUBCUTANEOUS at 23:59

## 2018-01-01 RX ADMIN — IPRATROPIUM BROMIDE AND ALBUTEROL SULFATE 3 ML: .5; 3 SOLUTION RESPIRATORY (INHALATION) at 01:43

## 2018-01-01 RX ADMIN — SERTRALINE HYDROCHLORIDE 100 MG: 100 TABLET ORAL at 11:14

## 2018-01-01 RX ADMIN — PIPERACILLIN SODIUM,TAZOBACTAM SODIUM 4.5 G: 4; .5 INJECTION, POWDER, FOR SOLUTION INTRAVENOUS at 00:12

## 2018-01-01 RX ADMIN — GABAPENTIN 100 MG: 100 CAPSULE ORAL at 00:22

## 2018-01-01 RX ADMIN — METHYLPREDNISOLONE SODIUM SUCCINATE 20 MG: 40 INJECTION, POWDER, FOR SOLUTION INTRAMUSCULAR; INTRAVENOUS at 21:44

## 2018-01-01 RX ADMIN — POTASSIUM CHLORIDE 20 MEQ: 20 TABLET, EXTENDED RELEASE ORAL at 10:10

## 2018-01-01 RX ADMIN — IPRATROPIUM BROMIDE AND ALBUTEROL SULFATE 3 ML: .5; 3 SOLUTION RESPIRATORY (INHALATION) at 01:01

## 2018-01-01 RX ADMIN — PIPERACILLIN SODIUM,TAZOBACTAM SODIUM 4.5 G: 4; .5 INJECTION, POWDER, FOR SOLUTION INTRAVENOUS at 17:50

## 2018-01-01 RX ADMIN — LEVOFLOXACIN 750 MG: 500 TABLET, FILM COATED ORAL at 12:50

## 2018-01-01 RX ADMIN — ASPIRIN 81 MG: 81 TABLET, COATED ORAL at 08:42

## 2018-01-01 RX ADMIN — METHYLPREDNISOLONE SODIUM SUCCINATE 125 MG: 125 INJECTION, POWDER, FOR SOLUTION INTRAMUSCULAR; INTRAVENOUS at 17:51

## 2018-01-01 RX ADMIN — GABAPENTIN 100 MG: 100 CAPSULE ORAL at 21:57

## 2018-01-01 RX ADMIN — DOCUSATE SODIUM 100 MG: 100 CAPSULE, LIQUID FILLED ORAL at 09:35

## 2018-01-01 RX ADMIN — ASPIRIN 81 MG: 81 TABLET, COATED ORAL at 09:19

## 2018-01-01 RX ADMIN — LORAZEPAM 1 MG: 1 TABLET ORAL at 20:12

## 2018-01-01 RX ADMIN — DOCUSATE SODIUM 100 MG: 100 CAPSULE, LIQUID FILLED ORAL at 17:03

## 2018-01-01 RX ADMIN — IPRATROPIUM BROMIDE AND ALBUTEROL SULFATE 3 ML: .5; 3 SOLUTION RESPIRATORY (INHALATION) at 20:15

## 2018-01-01 RX ADMIN — SODIUM CHLORIDE, PRESERVATIVE FREE 600 UNITS: 5 INJECTION INTRAVENOUS at 13:11

## 2018-01-01 RX ADMIN — BUDESONIDE 500 MCG: 0.5 INHALANT RESPIRATORY (INHALATION) at 08:16

## 2018-01-01 RX ADMIN — IPRATROPIUM BROMIDE AND ALBUTEROL SULFATE 3 ML: .5; 3 SOLUTION RESPIRATORY (INHALATION) at 14:11

## 2018-01-01 RX ADMIN — ASPIRIN 81 MG: 81 TABLET, COATED ORAL at 09:36

## 2018-01-01 RX ADMIN — Medication 5 ML: at 05:21

## 2018-01-01 RX ADMIN — METOPROLOL SUCCINATE 50 MG: 50 TABLET, EXTENDED RELEASE ORAL at 09:28

## 2018-01-01 RX ADMIN — BUDESONIDE 500 MCG: 0.5 INHALANT RESPIRATORY (INHALATION) at 08:32

## 2018-01-01 RX ADMIN — SODIUM CHLORIDE, PRESERVATIVE FREE 600 UNITS: 5 INJECTION INTRAVENOUS at 09:58

## 2018-01-01 RX ADMIN — ACETAMINOPHEN: 500 TABLET, FILM COATED ORAL at 22:43

## 2018-01-01 RX ADMIN — IPRATROPIUM BROMIDE AND ALBUTEROL SULFATE 3 ML: .5; 3 SOLUTION RESPIRATORY (INHALATION) at 07:55

## 2018-01-01 RX ADMIN — HEPARIN SODIUM 5000 UNITS: 5000 INJECTION INTRAVENOUS; SUBCUTANEOUS at 23:30

## 2018-01-01 RX ADMIN — LEVOFLOXACIN 750 MG: 5 INJECTION, SOLUTION INTRAVENOUS at 12:34

## 2018-01-01 RX ADMIN — ALBUTEROL SULFATE 2.5 MG: 2.5 SOLUTION RESPIRATORY (INHALATION) at 19:37

## 2018-01-01 RX ADMIN — ALBUTEROL SULFATE 2.5 MG: 2.5 SOLUTION RESPIRATORY (INHALATION) at 11:16

## 2018-01-01 RX ADMIN — ALBUTEROL SULFATE 10 MG: 2.5 SOLUTION RESPIRATORY (INHALATION) at 19:40

## 2018-01-01 RX ADMIN — ASPIRIN 81 MG: 81 TABLET, COATED ORAL at 08:32

## 2018-01-01 RX ADMIN — ENOXAPARIN SODIUM 40 MG: 40 INJECTION, SOLUTION INTRAVENOUS; SUBCUTANEOUS at 08:56

## 2018-01-01 RX ADMIN — Medication 10 ML: at 23:29

## 2018-01-01 RX ADMIN — LISINOPRIL 10 MG: 5 TABLET ORAL at 08:34

## 2018-01-01 RX ADMIN — PIPERACILLIN SODIUM,TAZOBACTAM SODIUM 4.5 G: 4; .5 INJECTION, POWDER, FOR SOLUTION INTRAVENOUS at 15:28

## 2018-01-01 RX ADMIN — METHYLPREDNISOLONE SODIUM SUCCINATE 30 MG: 40 INJECTION, POWDER, FOR SOLUTION INTRAMUSCULAR; INTRAVENOUS at 22:45

## 2018-01-01 RX ADMIN — IPRATROPIUM BROMIDE AND ALBUTEROL SULFATE 3 ML: .5; 3 SOLUTION RESPIRATORY (INHALATION) at 03:59

## 2018-01-01 RX ADMIN — ALBUTEROL SULFATE 2.5 MG: 2.5 SOLUTION RESPIRATORY (INHALATION) at 03:58

## 2018-01-01 RX ADMIN — LISINOPRIL 5 MG: 5 TABLET ORAL at 09:20

## 2018-01-01 RX ADMIN — Medication 20 ML: at 05:53

## 2018-01-01 RX ADMIN — VANCOMYCIN HYDROCHLORIDE 1500 MG: 10 INJECTION, POWDER, LYOPHILIZED, FOR SOLUTION INTRAVENOUS at 08:49

## 2018-01-01 RX ADMIN — SERTRALINE HYDROCHLORIDE 100 MG: 100 TABLET ORAL at 08:38

## 2018-01-01 RX ADMIN — Medication 10 ML: at 13:54

## 2018-01-01 RX ADMIN — PIPERACILLIN SODIUM,TAZOBACTAM SODIUM 4.5 G: 4; .5 INJECTION, POWDER, FOR SOLUTION INTRAVENOUS at 00:19

## 2018-01-01 RX ADMIN — BUDESONIDE 500 MCG: 0.5 INHALANT RESPIRATORY (INHALATION) at 20:01

## 2018-01-01 RX ADMIN — SERTRALINE 100 MG: 100 TABLET, FILM COATED ORAL at 08:56

## 2018-01-01 RX ADMIN — Medication 10 ML: at 17:15

## 2018-01-01 RX ADMIN — METOPROLOL SUCCINATE 50 MG: 25 TABLET, EXTENDED RELEASE ORAL at 10:30

## 2018-01-01 RX ADMIN — BUDESONIDE 500 MCG: 0.5 INHALANT RESPIRATORY (INHALATION) at 07:55

## 2018-01-01 RX ADMIN — GABAPENTIN 100 MG: 100 CAPSULE ORAL at 21:51

## 2018-01-01 RX ADMIN — BUDESONIDE 500 MCG: 0.5 INHALANT RESPIRATORY (INHALATION) at 21:35

## 2018-01-01 RX ADMIN — POTASSIUM CHLORIDE 20 MEQ: 20 TABLET, EXTENDED RELEASE ORAL at 23:29

## 2018-01-01 RX ADMIN — INSULIN LISPRO 2 UNITS: 100 INJECTION, SOLUTION INTRAVENOUS; SUBCUTANEOUS at 05:33

## 2018-01-01 RX ADMIN — SERTRALINE 100 MG: 100 TABLET, FILM COATED ORAL at 08:32

## 2018-01-01 RX ADMIN — LISINOPRIL 10 MG: 5 TABLET ORAL at 08:42

## 2018-01-01 RX ADMIN — ALBUTEROL SULFATE 2.5 MG: 2.5 SOLUTION RESPIRATORY (INHALATION) at 08:32

## 2018-01-01 RX ADMIN — Medication 10 ML: at 13:02

## 2018-01-01 RX ADMIN — SODIUM CHLORIDE, PRESERVATIVE FREE 600 UNITS: 5 INJECTION INTRAVENOUS at 22:01

## 2018-01-01 RX ADMIN — SERTRALINE 100 MG: 100 TABLET, FILM COATED ORAL at 08:42

## 2018-01-01 RX ADMIN — IPRATROPIUM BROMIDE AND ALBUTEROL SULFATE 3 ML: .5; 3 SOLUTION RESPIRATORY (INHALATION) at 20:36

## 2018-01-01 RX ADMIN — ASPIRIN 81 MG: 81 TABLET, COATED ORAL at 08:56

## 2018-01-01 RX ADMIN — SODIUM CHLORIDE 1000 ML: 900 INJECTION, SOLUTION INTRAVENOUS at 10:32

## 2018-01-01 RX ADMIN — IPRATROPIUM BROMIDE AND ALBUTEROL SULFATE 3 ML: .5; 3 SOLUTION RESPIRATORY (INHALATION) at 18:39

## 2018-01-01 RX ADMIN — BUDESONIDE 500 MCG: 0.5 INHALANT RESPIRATORY (INHALATION) at 19:52

## 2018-01-01 RX ADMIN — ALBUTEROL SULFATE 2.5 MG: 2.5 SOLUTION RESPIRATORY (INHALATION) at 23:53

## 2018-01-01 RX ADMIN — METHYLPREDNISOLONE SODIUM SUCCINATE 20 MG: 40 INJECTION, POWDER, FOR SOLUTION INTRAMUSCULAR; INTRAVENOUS at 20:12

## 2018-01-01 RX ADMIN — FERROUS SULFATE TAB 325 MG (65 MG ELEMENTAL FE) 325 MG: 325 (65 FE) TAB at 10:29

## 2018-01-01 RX ADMIN — POTASSIUM CHLORIDE 20 MEQ: 20 TABLET, EXTENDED RELEASE ORAL at 17:50

## 2018-01-01 RX ADMIN — METHYLPREDNISOLONE SODIUM SUCCINATE 40 MG: 40 INJECTION, POWDER, FOR SOLUTION INTRAMUSCULAR; INTRAVENOUS at 02:45

## 2018-01-01 RX ADMIN — FERROUS SULFATE TAB 325 MG (65 MG ELEMENTAL FE) 325 MG: 325 (65 FE) TAB at 17:36

## 2018-01-01 RX ADMIN — PIPERACILLIN SODIUM,TAZOBACTAM SODIUM 4.5 G: 4; .5 INJECTION, POWDER, FOR SOLUTION INTRAVENOUS at 23:28

## 2018-01-01 RX ADMIN — LORAZEPAM 1 MG: 1 TABLET ORAL at 21:03

## 2018-01-01 RX ADMIN — VANCOMYCIN HYDROCHLORIDE 1000 MG: 1 INJECTION, POWDER, LYOPHILIZED, FOR SOLUTION INTRAVENOUS at 10:16

## 2018-01-01 RX ADMIN — SODIUM CHLORIDE 125 ML/HR: 900 INJECTION, SOLUTION INTRAVENOUS at 01:28

## 2018-01-01 RX ADMIN — BUDESONIDE 500 MCG: 0.5 INHALANT RESPIRATORY (INHALATION) at 08:10

## 2018-01-01 RX ADMIN — SERTRALINE 100 MG: 100 TABLET, FILM COATED ORAL at 10:29

## 2018-01-01 RX ADMIN — HEPARIN SODIUM 5000 UNITS: 5000 INJECTION INTRAVENOUS; SUBCUTANEOUS at 22:44

## 2018-01-01 RX ADMIN — ASPIRIN 81 MG: 81 TABLET, COATED ORAL at 10:08

## 2018-01-01 RX ADMIN — SODIUM CHLORIDE, PRESERVATIVE FREE 600 UNITS: 5 INJECTION INTRAVENOUS at 21:02

## 2018-01-01 RX ADMIN — SODIUM CHLORIDE, PRESERVATIVE FREE 600 UNITS: 5 INJECTION INTRAVENOUS at 05:27

## 2018-01-01 RX ADMIN — ASPIRIN 81 MG: 81 TABLET, COATED ORAL at 09:28

## 2018-01-01 RX ADMIN — Medication 5 ML: at 06:00

## 2018-01-01 RX ADMIN — LORAZEPAM 1 MG: 1 TABLET ORAL at 22:53

## 2018-01-01 RX ADMIN — IPRATROPIUM BROMIDE AND ALBUTEROL SULFATE 3 ML: .5; 3 SOLUTION RESPIRATORY (INHALATION) at 11:51

## 2018-01-01 RX ADMIN — INSULIN LISPRO 2 UNITS: 100 INJECTION, SOLUTION INTRAVENOUS; SUBCUTANEOUS at 21:33

## 2018-01-01 RX ADMIN — IPRATROPIUM BROMIDE AND ALBUTEROL SULFATE 3 ML: .5; 3 SOLUTION RESPIRATORY (INHALATION) at 21:35

## 2018-01-01 RX ADMIN — METHYLPREDNISOLONE SODIUM SUCCINATE 40 MG: 40 INJECTION, POWDER, FOR SOLUTION INTRAMUSCULAR; INTRAVENOUS at 05:41

## 2018-01-01 RX ADMIN — IPRATROPIUM BROMIDE AND ALBUTEROL SULFATE 3 ML: .5; 3 SOLUTION RESPIRATORY (INHALATION) at 07:39

## 2018-01-01 RX ADMIN — SERTRALINE HYDROCHLORIDE 100 MG: 100 TABLET ORAL at 09:28

## 2018-01-01 RX ADMIN — ALBUTEROL SULFATE 2.5 MG: 2.5 SOLUTION RESPIRATORY (INHALATION) at 23:46

## 2018-01-01 RX ADMIN — ENOXAPARIN SODIUM 40 MG: 40 INJECTION, SOLUTION INTRAVENOUS; SUBCUTANEOUS at 21:22

## 2018-01-01 RX ADMIN — PANTOPRAZOLE SODIUM 40 MG: 40 TABLET, DELAYED RELEASE ORAL at 08:35

## 2018-01-01 RX ADMIN — ONDANSETRON 4 MG: 2 INJECTION INTRAMUSCULAR; INTRAVENOUS at 23:42

## 2018-01-01 RX ADMIN — ALBUTEROL SULFATE 2.5 MG: 2.5 SOLUTION RESPIRATORY (INHALATION) at 20:01

## 2018-01-01 RX ADMIN — PIPERACILLIN SODIUM,TAZOBACTAM SODIUM 4.5 G: 4; .5 INJECTION, POWDER, FOR SOLUTION INTRAVENOUS at 08:46

## 2018-01-01 RX ADMIN — FUROSEMIDE 20 MG: 10 INJECTION, SOLUTION INTRAMUSCULAR; INTRAVENOUS at 20:05

## 2018-01-01 RX ADMIN — TIOTROPIUM BROMIDE 18 MCG: 18 CAPSULE ORAL; RESPIRATORY (INHALATION) at 07:49

## 2018-01-01 RX ADMIN — GABAPENTIN 100 MG: 100 CAPSULE ORAL at 23:29

## 2018-01-01 RX ADMIN — Medication 10 ML: at 22:54

## 2018-01-01 RX ADMIN — ALBUTEROL SULFATE 2.5 MG: 2.5 SOLUTION RESPIRATORY (INHALATION) at 16:46

## 2018-01-01 RX ADMIN — IPRATROPIUM BROMIDE AND ALBUTEROL SULFATE 3 ML: .5; 3 SOLUTION RESPIRATORY (INHALATION) at 19:37

## 2018-01-01 RX ADMIN — HALOPERIDOL LACTATE 2 MG: 5 INJECTION, SOLUTION INTRAMUSCULAR at 21:02

## 2018-01-01 RX ADMIN — BUDESONIDE 500 MCG: 0.5 INHALANT RESPIRATORY (INHALATION) at 20:36

## 2018-01-01 RX ADMIN — ENOXAPARIN SODIUM 30 MG: 100 INJECTION SUBCUTANEOUS at 21:41

## 2018-01-01 RX ADMIN — SODIUM CHLORIDE 75 ML/HR: 900 INJECTION, SOLUTION INTRAVENOUS at 22:34

## 2018-01-01 RX ADMIN — TUBERCULIN PURIFIED PROTEIN DERIVATIVE 5 UNITS: 5 INJECTION, SOLUTION INTRADERMAL at 15:55

## 2018-01-01 RX ADMIN — Medication 10 ML: at 09:58

## 2018-01-01 RX ADMIN — BUDESONIDE 500 MCG: 0.5 INHALANT RESPIRATORY (INHALATION) at 07:17

## 2018-01-01 RX ADMIN — ACETAMINOPHEN: 500 TABLET, FILM COATED ORAL at 23:21

## 2018-01-01 RX ADMIN — INSULIN LISPRO 2 UNITS: 100 INJECTION, SOLUTION INTRAVENOUS; SUBCUTANEOUS at 12:40

## 2018-01-01 RX ADMIN — HYDRALAZINE HYDROCHLORIDE 20 MG: 20 INJECTION INTRAMUSCULAR; INTRAVENOUS at 21:03

## 2018-01-01 RX ADMIN — IPRATROPIUM BROMIDE AND ALBUTEROL SULFATE 3 ML: .5; 3 SOLUTION RESPIRATORY (INHALATION) at 17:00

## 2018-01-01 RX ADMIN — HALOPERIDOL LACTATE 2 MG: 5 INJECTION, SOLUTION INTRAMUSCULAR at 06:28

## 2018-01-01 RX ADMIN — IPRATROPIUM BROMIDE AND ALBUTEROL SULFATE 3 ML: .5; 3 SOLUTION RESPIRATORY (INHALATION) at 14:26

## 2018-01-01 RX ADMIN — ALBUTEROL SULFATE 2.5 MG: 2.5 SOLUTION RESPIRATORY (INHALATION) at 03:35

## 2018-01-01 RX ADMIN — PIPERACILLIN SODIUM,TAZOBACTAM SODIUM 4.5 G: 4; .5 INJECTION, POWDER, FOR SOLUTION INTRAVENOUS at 12:55

## 2018-01-01 RX ADMIN — METOPROLOL SUCCINATE 50 MG: 25 TABLET, EXTENDED RELEASE ORAL at 09:01

## 2018-01-01 RX ADMIN — SODIUM CHLORIDE, PRESERVATIVE FREE 600 UNITS: 5 INJECTION INTRAVENOUS at 21:46

## 2018-01-01 RX ADMIN — PIPERACILLIN SODIUM,TAZOBACTAM SODIUM 4.5 G: 4; .5 INJECTION, POWDER, FOR SOLUTION INTRAVENOUS at 01:07

## 2018-01-01 RX ADMIN — IPRATROPIUM BROMIDE AND ALBUTEROL SULFATE 3 ML: .5; 3 SOLUTION RESPIRATORY (INHALATION) at 04:05

## 2018-01-01 RX ADMIN — SODIUM CHLORIDE 647 ML: 900 INJECTION, SOLUTION INTRAVENOUS at 11:39

## 2018-01-01 RX ADMIN — IPRATROPIUM BROMIDE AND ALBUTEROL SULFATE 3 ML: .5; 3 SOLUTION RESPIRATORY (INHALATION) at 13:55

## 2018-01-01 RX ADMIN — ALBUTEROL SULFATE 10 MG: 2.5 SOLUTION RESPIRATORY (INHALATION) at 18:19

## 2018-01-01 RX ADMIN — SERTRALINE HYDROCHLORIDE 100 MG: 100 TABLET ORAL at 08:23

## 2018-01-01 RX ADMIN — ASPIRIN 81 MG: 81 TABLET, COATED ORAL at 10:29

## 2018-01-01 RX ADMIN — PANTOPRAZOLE SODIUM 40 MG: 40 TABLET, DELAYED RELEASE ORAL at 10:29

## 2018-01-01 RX ADMIN — HEPARIN SODIUM 5000 UNITS: 5000 INJECTION INTRAVENOUS; SUBCUTANEOUS at 12:34

## 2018-01-01 RX ADMIN — SODIUM CHLORIDE 75 ML/HR: 900 INJECTION, SOLUTION INTRAVENOUS at 05:22

## 2018-01-01 RX ADMIN — IPRATROPIUM BROMIDE AND ALBUTEROL SULFATE 3 ML: .5; 3 SOLUTION RESPIRATORY (INHALATION) at 08:10

## 2018-01-01 RX ADMIN — ALBUTEROL SULFATE 2.5 MG: 2.5 SOLUTION RESPIRATORY (INHALATION) at 15:43

## 2018-01-01 RX ADMIN — IPRATROPIUM BROMIDE AND ALBUTEROL SULFATE 3 ML: .5; 3 SOLUTION RESPIRATORY (INHALATION) at 00:54

## 2018-01-01 RX ADMIN — Medication 20 ML: at 16:35

## 2018-01-01 RX ADMIN — BUDESONIDE 500 MCG: 0.5 INHALANT RESPIRATORY (INHALATION) at 21:06

## 2018-01-01 RX ADMIN — PREDNISONE 20 MG: 20 TABLET ORAL at 08:38

## 2018-01-01 RX ADMIN — HEPARIN SODIUM 5000 UNITS: 5000 INJECTION INTRAVENOUS; SUBCUTANEOUS at 12:33

## 2018-01-01 RX ADMIN — TIOTROPIUM BROMIDE 18 MCG: 18 CAPSULE ORAL; RESPIRATORY (INHALATION) at 08:36

## 2018-01-01 RX ADMIN — METHYLPREDNISOLONE SODIUM SUCCINATE 20 MG: 40 INJECTION, POWDER, FOR SOLUTION INTRAMUSCULAR; INTRAVENOUS at 08:42

## 2018-01-01 RX ADMIN — PANTOPRAZOLE SODIUM 40 MG: 40 TABLET, DELAYED RELEASE ORAL at 09:01

## 2018-01-01 RX ADMIN — METOPROLOL SUCCINATE 50 MG: 50 TABLET, EXTENDED RELEASE ORAL at 10:10

## 2018-01-01 RX ADMIN — VANCOMYCIN HYDROCHLORIDE 750 MG: 10 INJECTION, POWDER, LYOPHILIZED, FOR SOLUTION INTRAVENOUS at 17:17

## 2018-01-01 RX ADMIN — LORAZEPAM 0.5 MG: 0.5 TABLET ORAL at 00:02

## 2018-01-01 RX ADMIN — POTASSIUM CHLORIDE 20 MEQ: 20 TABLET, EXTENDED RELEASE ORAL at 10:11

## 2018-01-01 RX ADMIN — VANCOMYCIN HYDROCHLORIDE 1000 MG: 1 INJECTION, POWDER, LYOPHILIZED, FOR SOLUTION INTRAVENOUS at 12:55

## 2018-01-01 RX ADMIN — INSULIN LISPRO 4 UNITS: 100 INJECTION, SOLUTION INTRAVENOUS; SUBCUTANEOUS at 11:49

## 2018-01-01 RX ADMIN — HEPARIN SODIUM 5000 UNITS: 5000 INJECTION INTRAVENOUS; SUBCUTANEOUS at 22:21

## 2018-01-01 RX ADMIN — GUAIFENESIN 1200 MG: 600 TABLET, EXTENDED RELEASE ORAL at 09:28

## 2018-01-01 RX ADMIN — ASPIRIN 81 MG: 81 TABLET, COATED ORAL at 10:10

## 2018-01-01 RX ADMIN — ALBUTEROL SULFATE 2.5 MG: 2.5 SOLUTION RESPIRATORY (INHALATION) at 23:04

## 2018-01-01 RX ADMIN — FERROUS SULFATE TAB 325 MG (65 MG ELEMENTAL FE) 325 MG: 325 (65 FE) TAB at 08:42

## 2018-01-01 RX ADMIN — PANTOPRAZOLE SODIUM 40 MG: 40 TABLET, DELAYED RELEASE ORAL at 13:23

## 2018-01-01 RX ADMIN — HEPARIN SODIUM 5000 UNITS: 5000 INJECTION INTRAVENOUS; SUBCUTANEOUS at 06:24

## 2018-01-01 RX ADMIN — DOCUSATE SODIUM 100 MG: 100 CAPSULE, LIQUID FILLED ORAL at 11:17

## 2018-01-01 RX ADMIN — Medication 10 ML: at 21:58

## 2018-01-01 RX ADMIN — Medication 20 ML: at 13:11

## 2018-01-01 RX ADMIN — Medication 10 ML: at 13:11

## 2018-01-01 RX ADMIN — FUROSEMIDE 20 MG: 10 INJECTION, SOLUTION INTRAMUSCULAR; INTRAVENOUS at 12:29

## 2018-01-01 RX ADMIN — INSULIN LISPRO 4 UNITS: 100 INJECTION, SOLUTION INTRAVENOUS; SUBCUTANEOUS at 12:50

## 2018-01-01 RX ADMIN — ALBUTEROL SULFATE 2.5 MG: 2.5 SOLUTION RESPIRATORY (INHALATION) at 23:11

## 2018-01-01 RX ADMIN — Medication 10 ML: at 05:22

## 2018-01-01 RX ADMIN — ASPIRIN 81 MG: 81 TABLET, COATED ORAL at 08:23

## 2018-01-01 RX ADMIN — IPRATROPIUM BROMIDE AND ALBUTEROL SULFATE 3 ML: .5; 3 SOLUTION RESPIRATORY (INHALATION) at 22:31

## 2018-01-01 RX ADMIN — METHYLPREDNISOLONE SODIUM SUCCINATE 40 MG: 40 INJECTION, POWDER, FOR SOLUTION INTRAMUSCULAR; INTRAVENOUS at 05:40

## 2018-01-01 RX ADMIN — PANTOPRAZOLE SODIUM 40 MG: 40 TABLET, DELAYED RELEASE ORAL at 08:42

## 2018-01-01 RX ADMIN — Medication 10 ML: at 21:34

## 2018-01-01 RX ADMIN — HEPARIN SODIUM 5000 UNITS: 5000 INJECTION INTRAVENOUS; SUBCUTANEOUS at 13:40

## 2018-01-01 RX ADMIN — INSULIN LISPRO 2 UNITS: 100 INJECTION, SOLUTION INTRAVENOUS; SUBCUTANEOUS at 05:42

## 2018-01-01 RX ADMIN — SODIUM CHLORIDE, PRESERVATIVE FREE 600 UNITS: 5 INJECTION INTRAVENOUS at 16:35

## 2018-01-01 RX ADMIN — ALBUTEROL SULFATE 2.5 MG: 2.5 SOLUTION RESPIRATORY (INHALATION) at 16:19

## 2018-01-01 RX ADMIN — ALBUTEROL SULFATE 2.5 MG: 2.5 SOLUTION RESPIRATORY (INHALATION) at 11:09

## 2018-01-01 RX ADMIN — METHYLPREDNISOLONE SODIUM SUCCINATE 40 MG: 40 INJECTION, POWDER, FOR SOLUTION INTRAMUSCULAR; INTRAVENOUS at 11:12

## 2018-01-01 RX ADMIN — Medication 20 ML: at 05:51

## 2018-01-01 RX ADMIN — SODIUM CHLORIDE 1000 ML: 900 INJECTION, SOLUTION INTRAVENOUS at 15:07

## 2018-01-01 RX ADMIN — METHYLPREDNISOLONE SODIUM SUCCINATE 40 MG: 40 INJECTION, POWDER, FOR SOLUTION INTRAMUSCULAR; INTRAVENOUS at 08:28

## 2018-01-01 RX ADMIN — METHYLPREDNISOLONE SODIUM SUCCINATE 40 MG: 40 INJECTION, POWDER, FOR SOLUTION INTRAMUSCULAR; INTRAVENOUS at 21:51

## 2018-01-01 RX ADMIN — Medication 10 ML: at 22:05

## 2018-01-01 RX ADMIN — Medication 10 ML: at 05:28

## 2018-01-01 RX ADMIN — LORAZEPAM 0.5 MG: 0.5 TABLET ORAL at 13:02

## 2018-01-01 RX ADMIN — IPRATROPIUM BROMIDE AND ALBUTEROL SULFATE 3 ML: .5; 3 SOLUTION RESPIRATORY (INHALATION) at 07:38

## 2018-01-01 RX ADMIN — Medication 10 ML: at 00:00

## 2018-01-01 RX ADMIN — GABAPENTIN 100 MG: 100 CAPSULE ORAL at 22:03

## 2018-01-01 RX ADMIN — GABAPENTIN 100 MG: 100 CAPSULE ORAL at 22:44

## 2018-01-01 RX ADMIN — PIPERACILLIN SODIUM,TAZOBACTAM SODIUM 4.5 G: 4; .5 INJECTION, POWDER, FOR SOLUTION INTRAVENOUS at 22:20

## 2018-01-01 RX ADMIN — Medication 10 ML: at 06:19

## 2018-01-01 RX ADMIN — ASPIRIN 81 MG: 81 TABLET, COATED ORAL at 09:39

## 2018-01-01 RX ADMIN — SERTRALINE HYDROCHLORIDE 100 MG: 100 TABLET ORAL at 10:08

## 2018-01-01 RX ADMIN — Medication 10 ML: at 15:28

## 2018-01-01 RX ADMIN — OXYCODONE HYDROCHLORIDE 5 MG: 5 TABLET ORAL at 09:35

## 2018-01-01 RX ADMIN — ALBUTEROL SULFATE 2.5 MG: 2.5 SOLUTION RESPIRATORY (INHALATION) at 08:04

## 2018-01-01 RX ADMIN — IPRATROPIUM BROMIDE AND ALBUTEROL SULFATE 3 ML: .5; 3 SOLUTION RESPIRATORY (INHALATION) at 08:23

## 2018-01-01 RX ADMIN — ALBUTEROL SULFATE 2.5 MG: 2.5 SOLUTION RESPIRATORY (INHALATION) at 07:20

## 2018-01-01 RX ADMIN — ASPIRIN 81 MG: 81 TABLET, COATED ORAL at 08:34

## 2018-01-01 RX ADMIN — IPRATROPIUM BROMIDE AND ALBUTEROL SULFATE 3 ML: .5; 3 SOLUTION RESPIRATORY (INHALATION) at 01:39

## 2018-01-01 RX ADMIN — GUAIFENESIN 1200 MG: 600 TABLET, EXTENDED RELEASE ORAL at 21:22

## 2018-01-01 RX ADMIN — ALBUTEROL SULFATE 10 MG: 0.83 SOLUTION RESPIRATORY (INHALATION) at 19:40

## 2018-01-01 RX ADMIN — GABAPENTIN 100 MG: 100 CAPSULE ORAL at 21:10

## 2018-01-01 RX ADMIN — BUDESONIDE 500 MCG: 0.5 INHALANT RESPIRATORY (INHALATION) at 20:20

## 2018-01-01 RX ADMIN — BUDESONIDE 500 MCG: 0.5 INHALANT RESPIRATORY (INHALATION) at 07:27

## 2018-01-01 RX ADMIN — BUDESONIDE 500 MCG: 0.5 INHALANT RESPIRATORY (INHALATION) at 07:23

## 2018-01-01 RX ADMIN — BUDESONIDE 500 MCG: 0.5 INHALANT RESPIRATORY (INHALATION) at 07:38

## 2018-01-01 RX ADMIN — Medication 5 ML: at 22:24

## 2018-01-01 RX ADMIN — SERTRALINE HYDROCHLORIDE 100 MG: 100 TABLET ORAL at 10:12

## 2018-01-01 RX ADMIN — Medication 10 ML: at 05:54

## 2018-01-01 RX ADMIN — TIOTROPIUM BROMIDE 18 MCG: 18 CAPSULE ORAL; RESPIRATORY (INHALATION) at 11:23

## 2018-01-01 RX ADMIN — IPRATROPIUM BROMIDE AND ALBUTEROL SULFATE 3 ML: .5; 3 SOLUTION RESPIRATORY (INHALATION) at 11:40

## 2018-01-01 RX ADMIN — LORAZEPAM 1 MG: 1 TABLET ORAL at 21:33

## 2018-01-01 RX ADMIN — IPRATROPIUM BROMIDE AND ALBUTEROL SULFATE 3 ML: .5; 3 SOLUTION RESPIRATORY (INHALATION) at 13:44

## 2018-01-01 RX ADMIN — IPRATROPIUM BROMIDE AND ALBUTEROL SULFATE 3 ML: .5; 3 SOLUTION RESPIRATORY (INHALATION) at 02:47

## 2018-01-01 RX ADMIN — VANCOMYCIN HYDROCHLORIDE 750 MG: 10 INJECTION, POWDER, LYOPHILIZED, FOR SOLUTION INTRAVENOUS at 18:13

## 2018-01-01 RX ADMIN — Medication 10 ML: at 00:22

## 2018-01-01 RX ADMIN — METHYLPREDNISOLONE SODIUM SUCCINATE 20 MG: 40 INJECTION, POWDER, FOR SOLUTION INTRAMUSCULAR; INTRAVENOUS at 10:29

## 2018-01-01 RX ADMIN — PIPERACILLIN SODIUM,TAZOBACTAM SODIUM 4.5 G: 4; .5 INJECTION, POWDER, FOR SOLUTION INTRAVENOUS at 13:39

## 2018-01-01 RX ADMIN — GUAIFENESIN 1200 MG: 600 TABLET, EXTENDED RELEASE ORAL at 21:41

## 2018-01-01 RX ADMIN — METOPROLOL SUCCINATE 25 MG: 25 TABLET, EXTENDED RELEASE ORAL at 08:32

## 2018-01-01 RX ADMIN — GABAPENTIN 100 MG: 100 CAPSULE ORAL at 21:44

## 2018-01-01 RX ADMIN — Medication 20 ML: at 22:01

## 2018-01-01 RX ADMIN — BUDESONIDE 500 MCG: 0.5 INHALANT RESPIRATORY (INHALATION) at 19:17

## 2018-01-01 RX ADMIN — ALBUTEROL SULFATE 2.5 MG: 2.5 SOLUTION RESPIRATORY (INHALATION) at 11:35

## 2018-06-07 NOTE — ED NOTES
I have reviewed discharge instructions with the patient and spouse. The patient and spouse verbalized understanding. Patient left ED via Discharge Method: ambulatory to Home with self and spouse. Opportunity for questions and clarification provided. Patient given 1 scripts. To continue your aftercare when you leave the hospital, you may receive an automated call from our care team to check in on how you are doing. This is a free service and part of our promise to provide the best care and service to meet your aftercare needs.  If you have questions, or wish to unsubscribe from this service please call [phone]. Thank you for Choosing our [facility].

## 2018-06-07 NOTE — ED PROVIDER NOTES
HPI Comments: Patient is a 27-year-old female who is coming in with shortness of breath. She states it's been going on for 2-3 days and progressively getting worse. She has a history of COPD and uses oxygen only at night. She's had some productive sputum and no fevers chills or any chest pain. Patient is a 77 y.o. female presenting with shortness of breath. The history is provided by the patient. Shortness of Breath   Associated symptoms include cough and wheezing. Pertinent negatives include no fever, no chest pain, no vomiting and no abdominal pain.         Past Medical History:   Diagnosis Date    [facility]2016    required [facility]ubation at [facility]    Anemia     ANEMIA /GI BLEED 1- PRBC 2 UNITS    Arrhythmia     HX SVT    CAD (coronary artery disease)     aortic valve stenosis pending repair/replacement    Cancer ([facility] 6/2012    BREAST CANCER RIGHT    Cholelithiasis 1/2/2013    COPD exacerbation ([facility] 2/1/6657    Systolic murmur 4/3/3029    Unspecified adverse effect of anesthesia     OVERSEDATION, SEVERE HYPOTENSION       Past Surgical History:   Procedure Laterality Date    CARDIAC SURG PROCEDURE UNLIST  2/8/2012    HX AORTIC VALVE REPLACEMENT  2/28/13    Dr. William Bodily HX BREAST LUMPECTOMY  6/2012 8/2012    RIGHT    HX CAROTID ENDARTERECTOMY  12/8/2008    RIGHT    HX CAROTID ENDARTERECTOMY Left 10/2016    Dr. Geoffrey Holt at [facility]; 2008    HX LAP CHOLECYSTECTOMY  09/2010    HX VASCULAR ACCESS  1/2013    pic line placed and removed         Family History:   Problem Relation Age of Onset    Stroke Mother     Lung Disease Father      enphazema    Hypertension Other        Social History     Social History    Marital status:      Spouse name: N/A    Number of children: N/A    Years of education: N/A     Occupational History    retired Retired     sales at [employer] Smoking status: Current Every Day Smoker     Packs/day: 0.25     Years: 50.00     Types: Cigarettes    Smokeless tobacco: Never Used      Comment: Pt states she smokes about 8 a day.  Alcohol use No    Drug use: No    Sexual activity: No     Other Topics Concern    Not on file     Social History Narrative    . Lives with . She is originally from [place]. ALLERGIES: Demerol [meperidine]; Gabapentin; Lortab [hydrocodone-acetaminophen]; Percocet [oxycodone-acetaminophen]; and Statins-hmg-coa reductase inhibitors    Review of Systems   Constitutional: Negative for chills and fever. Respiratory: Positive for cough, shortness of breath and wheezing. Cardiovascular: Negative for chest pain and palpitations. Gastrointestinal: Negative for abdominal pain, diarrhea, nausea and vomiting. Skin: Negative. All other systems reviewed and are negative. Vitals:    06/07/18 1630 06/07/18 1700 06/07/18 1730 06/07/18 1819   BP: 165/85 (!) 159/95 (!) 141/91    Pulse: 93 92 90    Resp:       Temp:       SpO2: 97% 94% 96% 94%   Weight:       Height:                Physical Exam   Constitutional: She is oriented to person, place, and time. She appears well-developed and well-nourished. Distressed: some respiratory distress. HENT:   Head: Normocephalic and atraumatic. Eyes: Conjunctivae are normal. No scleral icterus. Neck: Normal range of motion. Neck supple. Cardiovascular: Normal rate, regular rhythm and normal heart sounds. Pulmonary/Chest: No stridor. She has wheezes (diminished breath sounds). She has no rales. Increase work of breathing with diminished breath sounds and wheezes   Abdominal: Soft. There is no tenderness. There is no rebound and no guarding. Neurological: She is alert and oriented to person, place, and time. No focal weakness   Skin: Skin is warm and dry. No rash noted. She is not diaphoretic. Psychiatric: She has a normal mood and affect.  Her behavior is normal.   Nursing note and vitals reviewed. MDM  Number of Diagnoses or Management Options  Diagnosis management comments: Patient looks much better on reevaluation no longer in any respiratory distress. Still has some wheezing. I advised that she be admitted she  Declined this and wants to go home the matter what. She has plenty of albuterol home and states she has oxygen that she is going to wear continuously. She also hasn't had steroids and I will add an oral steroid. She was given strict return precautions as she is high risk for complications when going home. Tony Loera MD; 6/7/2018 @7:13 PM Voice dictation software was used during the making of this note. This software is not perfect and grammatical and other typographical errors may be present.   This note has not been proofread for errors.  ===================================================================        Amount and/or Complexity of Data Reviewed  Clinical lab tests: ordered and reviewed (Results for orders placed or performed during the hospital encounter of 06/07/18  -CBC WITH AUTOMATED DIFF       Result                                            Value                         Ref Range                       WBC                                               9.0                           4.3 - 11.1 K/uL                 RBC                                               4.36                          4.05 - 5.25 M/uL                HGB                                               13.3                          11.7 - 15.4 g/dL                HCT                                               40.8                          35.8 - 46.3 %                   MCV                                               93.6                          79.6 - 97.8 FL                  MCH                                               30.5                          26.1 - 32.9 PG                  MCHC                                              32.6                          31.4 - 35.0 g/dL                RDW                                               13.6                          11.9 - 14.6 %                   PLATELET                                          299                           150 - 450 K/uL                  MPV                                               9.6 (L)                       10.8 - 14.1 FL                  DF                                                AUTOMATED                                                     NEUTROPHILS                                       74                            43 - 78 %                       LYMPHOCYTES                                       16                            13 - 44 %                       MONOCYTES                                         9                             4.0 - 12.0 %                    EOSINOPHILS                                       1                             0.5 - 7.8 %                     BASOPHILS                                         0                             0.0 - 2.0 %                     IMMATURE GRANULOCYTES                             0                             0.0 - 5.0 %                     ABS. NEUTROPHILS                                  6.6                           1.7 - 8.2 K/UL                  ABS. LYMPHOCYTES                                  1.4                           0.5 - 4.6 K/UL                  ABS. MONOCYTES                                    0.8                           0.1 - 1.3 K/UL                  ABS. EOSINOPHILS                                  0.1                           0.0 - 0.8 K/UL                  ABS. BASOPHILS                                    0.0                           0.0 - 0.2 K/UL                  ABS. IMM.  GRANS.                                  0.0                           0.0 - 0.5 K/UL             -METABOLIC PANEL, COMPREHENSIVE       Result                                            Value                         Ref Range Sodium                                            143                           136 - 145 mmol/L                Potassium                                         4.5                           3.5 - 5.1 mmol/L                Chloride                                          104                           98 - 107 mmol/L                 CO2                                               29                            21 - 32 mmol/L                  Anion gap                                         10                            7 - 16 mmol/L                   Glucose                                           104 (H)                       65 - 100 mg/dL                  BUN                                               19                            8 - 23 MG/DL                    Creatinine                                        0.92                          0.6 - 1.0 MG/DL                 GFR est AA                                        >60                           >60 ml/min/1.73m2               GFR est non-AA                                    >60                           >60 ml/min/1.73m2               Calcium                                           9.2                           8.3 - 10.4 MG/DL                Bilirubin, total                                  0.4                           0.2 - 1.1 MG/DL                 ALT (SGPT)                                        19                            12 - 65 U/L                     AST (SGOT)                                        29                            15 - 37 U/L                     Alk. phosphatase                                  105                           50 - 136 U/L                    Protein, total                                    8.0                           6.3 - 8.2 g/dL                  Albumin                                           3.4                           3.2 - 4.6 g/dL                  Globulin 4.6 (H)                       2.3 - 3.5 g/dL                  A-G Ratio                                         0.7 (L)                       1.2 - 3.5                  -TROPONIN I       Result                                            Value                         Ref Range                       Troponin-I, Qt.                                   <0.02 (L)                     0.02 - 0.05 NG/ML          -BLOOD GAS, ARTERIAL       Result                                            Value                         Ref Range                       pH                                                7.34 (L)                      7.35 - 7.45                     PCO2                                              61 (H)                        35 - 45 mmHg                    PO2                                               111 (H)                       80 - 105 mmHg                   BICARBONATE                                       32 (H)                        22 - 26 mmol/L                  BASE EXCESS                                       4.8 (H)                       0 - 3 mmol/L                    TOTAL HEMOGLOBIN                                  13.7                          11.7 - 15.0 GM/DL               O2 SAT                                            98                            92 - 98.5 %                     Arterial O2 Hgb                                   95.6                          94 - 97 %                       CARBOXYHEMOGLOBIN                                 2.1 (H)                       0.5 - 1.5 %                     METHEMOGLOBIN                                     0.2                           0.0 - 1.5 %                     DEOXYHEMOGLOBIN                                   2                             0.0 - 5.0 %                     SITE                                              RR                                                            ALLENS TEST POSITIVE                                                      O2 FLOW                                           2.00                          L/min                           Respiratory comment:                                                                                        DR Quiñonez at 6 7 2018 6 16 19 PM. Read back.   -EKG, 12 LEAD, INITIAL       Result                                            Value                         Ref Range                       Ventricular Rate                                  98                            BPM                             Atrial Rate                                       98                            BPM                             P-R Interval                                      178                           ms                              QRS Duration                                      122                           ms                              Q-T Interval                                      376                           ms                              QTC Calculation (Bezet)                           480                           ms                              Calculated P Axis                                 89                            degrees                         Calculated R Axis                                 167                           degrees                         Calculated T Axis                                 83                            degrees                         Diagnosis                                                                                                   Normal sinus rhythm   Biatrial enlargement   Right bundle branch block   Abnormal ECG   When compared with ECG of 09-MAR-2013 17:36,   No significant change was found    )  Tests in the radiology section of CPT®: ordered and reviewed (Xr Chest Sngl V    Result Date: 6/7/2018  Chest X-ray INDICATION:   Shortness of breath A portable AP view of the chest was obtained. FINDINGS: The lungs are clear. There are no infiltrates or effusions. The heart size is normal.  There are sternotomy changes.       IMPRESSION: No acute findings in the chest    )  Independent visualization of images, tracings, or specimens: yes          ED Course       Procedures

## 2018-06-07 NOTE — ED NOTES
Patient to ED via POV. Patient reports increasing SHOB over the past few days. Patient with prior hx of COPD. Patient reports she takes 2. 5LPM O2 PRN QHS. Patient with O2 sat 81% at time of triage though patient able to speak in complete sentences. Patient erports scant productive cough with clear sputum production. Patient denies any peripheral swelling. Patient denies any orthopnea. Patient denies any pain/discomfort at current. On O2 via NC @2LPM, patient with improvement in O2 sat to low 90s. Patient reports relief of her feeling of SHOB.

## 2018-06-07 NOTE — DISCHARGE INSTRUCTIONS
Chronic Obstructive Pulmonary Disease (COPD): Care Instructions  Your Care Instructions    Chronic obstructive pulmonary disease (COPD) is a general term for a group of lung diseases, including emphysema and chronic bronchitis. People with COPD have decreased airflow in and out of the lungs, which makes it hard to breathe. The airways also can get clogged with thick mucus. Cigarette smoking is a major cause of COPD. Although there is no cure for COPD, you can slow its progress. Following your treatment plan and taking care of yourself can help you feel better and live longer. Follow-up care is a key part of your treatment and safety. Be sure to make and go to all appointments, and call your doctor if you are having problems. It's also a good idea to know your test results and keep a list of the medicines you take. How can you care for yourself at home? ?Staying healthy  ? · Do not smoke. This is the most important step you can take to prevent more damage to your lungs. If you need help quitting, talk to your doctor about stop-smoking programs and medicines. These can increase your chances of quitting for good. ? · Avoid colds and flu. Get a pneumococcal vaccine shot. If you have had one before, ask your doctor whether you need a second dose. Get the flu vaccine every fall. If you must be around people with colds or the flu, wash your hands often. ? · Avoid secondhand smoke, air pollution, and high altitudes. Also avoid cold, dry air and hot, humid air. Stay at home with your windows closed when air pollution is bad. ?Medicines and oxygen therapy  ? · Take your medicines exactly as prescribed. Call your doctor if you think you are having a problem with your medicine. ? · You may be taking medicines such as:  ¨ Bronchodilators. These help open your airways and make breathing easier. Bronchodilators are either short-acting (work for 6 to 9 hours) or long-acting (work for 24 hours).  You inhale most bronchodilators, so they start to act quickly. Always carry your quick-relief inhaler with you in case you need it while you are away from home. ¨ Corticosteroids (prednisone, budesonide). These reduce airway inflammation. They come in pill or inhaled form. You must take these medicines every day for them to work well. ? · A spacer may help you get more inhaled medicine to your lungs. Ask your doctor or pharmacist if a spacer is right for you. If it is, ask how to use it properly. ? · Do not take any vitamins, over-the-counter medicine, or herbal products without talking to your doctor first.   ? · If your doctor prescribed antibiotics, take them as directed. Do not stop taking them just because you feel better. You need to take the full course of antibiotics. ? · Oxygen therapy boosts the amount of oxygen in your blood and helps you breathe easier. Use the flow rate your doctor has recommended, and do not change it without talking to your doctor first.   Activity  ? · Get regular exercise. Walking is an easy way to get exercise. Start out slowly, and walk a little more each day. ? · Pay attention to your breathing. You are exercising too hard if you cannot talk while you are exercising. ? · Take short rest breaks when doing household chores and other activities. ? · Learn breathing methods-such as breathing through pursed lips-to help you become less short of breath. ? · If your doctor has not set you up with a pulmonary rehabilitation program, talk to him or her about whether rehab is right for you. Rehab includes exercise programs, education about your disease and how to manage it, help with diet and other changes, and emotional support. Diet  ? · Eat regular, healthy meals. Use bronchodilators about 1 hour before you eat to make it easier to eat. Eat several small meals instead of three large ones. Drink beverages at the end of the meal. Avoid foods that are hard to chew.    ? · Eat foods that contain protein so that you do not lose muscle mass. ? · Talk with your doctor if you gain too much weight or if you lose weight without trying. ?Mental health  ? · Talk to your family, friends, or a therapist about your feelings. It is normal to feel frightened, angry, hopeless, helpless, and even guilty. Talking openly about bad feelings can help you cope. If these feelings last, talk to your doctor. When should you call for help? Call 911 anytime you think you may need emergency care. For example, call if:  ? · You have severe trouble breathing. ?Call your doctor now or seek immediate medical care if:  ? · You have new or worse trouble breathing. ? · You cough up blood. ? · You have a fever. ? Watch closely for changes in your health, and be sure to contact your doctor if:  ? · You cough more deeply or more often, especially if you notice more mucus or a change in the color of your mucus. ? · You have new or worse swelling in your legs or belly. ? · You are not getting better as expected. Where can you learn more? Go to http://grabiel-jeni.info/. [place] [employer] in the search box to learn more about \"Chronic Obstructive Pulmonary Disease (COPD): Care Instructions. \"  Current as of: May 12, 2017  Content Version: 11.4  © 5909-3050 [employer]. Care instructions adapted under license by [employer] (which disclaims liability or warranty for this information). If you have questions about a medical condition or this instruction, always ask your healthcare professional. [address] any warranty or liability for your use of this information.

## 2018-10-12 PROBLEM — J44.1 COPD, FREQUENT EXACERBATIONS (HCC): Status: ACTIVE | Noted: 2018-01-01

## 2018-10-12 NOTE — H&P
Hospitalist H&P Note Admit Date:  10/12/2018  3:13 PM  
Name:  Rosann Heimlich Age:  77 y.o. 
:  1940 MRN:  [id number] PCP:  Antwan Goode MD 
Treatment Team: Attending Provider: Shanta Montana MD; Primary Nurse: Harpreet Paul HPI:  
Mrs. Eddie Clemente is a 78 y/o female with a h/o severe COPD. She has had two exacerbations in the last 3 months, both treated as an outpatient with oral steroids and Levaquin. She was seen by pulmonology last month as well. She wears 4L NC continously. Over the past few weeks she has had increased SOB, cough and sputum production. No fevers, chills, night sweats, recent travel or sick contacts. She had been given Anoro a few weeks back which initially worked well but it was too expensive to continue. Otherwise she is not on any maintenance inhalers. She continues to smoke 2-3 cigarettes per day. No chest pain. She is visibly tremulous which is probably from her multiple albuterol nebs. ROS otherwise negative. 10 systems reviewed and negative except as noted in HPI. Past Medical History:  
Diagnosis Date  [facility]  
 [facility]  Anemia ANEMIA /GI BLEED 2013 PRBC 2 UNITS  Arrhythmia HX SVT  CAD (coronary artery disease) aortic valve stenosis pending repair/replacement  Cancer ([facility] 2012 BREAST CANCER RIGHT  Cholelithiasis 2013  COPD exacerbation ([facility] 1/3/2013  Systolic murmur 6378  Unspecified adverse effect of anesthesia OVERSEDATION, SEVERE HYPOTENSION Past Surgical History:  
Procedure Laterality Date  CARDIAC SURG PROCEDURE UNLIST  2012  HX AORTIC VALVE REPLACEMENT  13 Dr. Ben Baugh  HX BREAST LUMPECTOMY  2012 RIGHT  HX CAROTID ENDARTERECTOMY  2008 RIGHT  HX CAROTID ENDARTERECTOMY Left 10/2016 Dr. Kapil Yanes at [facility] [place];   HX LAP CHOLECYSTECTOMY  2010  HX VASCULAR ACCESS  2013  
 pic line placed and removed Allergies Allergen Reactions  Demerol [Meperidine] Other (comments) Low blood pressure  Gabapentin Other (comments) Heart Racing  Lortab [Hydrocodone-Acetaminophen] Swelling HYPOTENSION 
  
 Percocet [Oxycodone-Acetaminophen] Other (comments)  
  hallucinations  Statins-Hmg-Coa Reductase Inhibitors Unknown (comments) Social History Substance Use Topics  Smoking status: Current Every Day Smoker Packs/day: 0.25 Years: 50.00 Types: Cigarettes  Smokeless tobacco: Never Used Comment: Pt states she smokes about 8 a day.  Alcohol use No  
  
Family History Problem Relation Age of Onset  Stroke Mother  Lung Disease Father   
  Macel Fluke  Hypertension Other Immunization History Administered Date(s) Administered  Influenza High Dose Vaccine PF 11/10/2016, 2017  Influenza Vaccine 2015  Pneumococcal Conjugate (PCV-13) 2015 PTA Medications: 
Prior to Admission Medications Prescriptions Last Dose Informant Patient Reported? Taking? OXYGEN-AIR DELIVERY SYSTEMS   Yes No  
Sig: by Does Not Apply route. 2.5 L cont. albuterol (PROVENTIL HFA, VENTOLIN HFA, PROAIR HFA) 90 mcg/actuation inhaler   No No  
Sig: Take 2 Puffs by inhalation every four (4) hours as needed for Wheezing. albuterol-ipratropium (DUO-NEB) 2.5 mg-0.5 mg/3 ml nebu   No No  
Sig: 3 mL by Nebulization route four (4) times daily. aspirin delayed-release 81 mg tablet   No No  
Sig: Take 1 Tab by mouth daily. budesonide (PULMICORT) 0.5 mg/2 mL nbsp   No No  
Si mL by Nebulization route two (2) times a day.  
gabapentin (NEURONTIN) 100 mg capsule   No No  
Sig: Take 1 Cap by mouth nightly. metoprolol succinate (TOPROL-XL) 50 mg XL tablet   No No  
Sig: Take 1 Tab by mouth daily. sertraline (ZOLOFT) 100 mg tablet   No No  
Sig: Take 1 Tab by mouth daily. traMADol (ULTRAM) 50 mg tablet   No No  
Sig: TAKE ONE TABLET BY MOUTH EVERY 6 HOURS AS NEEDED FOR PAIN (MAX  DAILY  AMOUNT  200MG) Facility-Administered Medications: None Objective:  
Patient Vitals for the past 24 hrs: 
 Temp Pulse Resp BP SpO2  
10/12/18 1814 - (!) 111 - 128/61 96 % 10/12/18 1759 - (!) 116 - 162/75 -  
10/12/18 1739 - (!) 107 - 162/75 97 % 10/12/18 1718 - (!) 101 - 146/70 100 % 10/12/18 1705 - - - - 94 % 10/12/18 1658 - (!) 101 - 141/77 97 % 10/12/18 1639 - 100 - 124/73 -  
10/12/18 1618 - 97 - 120/60 91 % 10/12/18 1609 - 95 - 123/63 97 % 10/12/18 1559 - - - - 96 % 10/12/18 1536 - 97 - 132/64 98 % 10/12/18 1457 97.7 °F (36.5 °C) 98 20 135/64 99 % Oxygen Therapy O2 Sat (%): 96 % (10/12/18 1814) Pulse via Oximetry: 110 beats per minute (10/12/18 1814) O2 Device: Nasal cannula (10/12/18 1705) O2 Flow Rate (L/min): 3.5 l/min (10/12/18 1705) No intake or output data in the 24 hours ending 10/12/18 1846 Physical Exam: 
General:    Alert. Underweight. Eyes:   Normal sclera. Extraocular movements intact. ENT:  Normocephalic, atraumatic. Moist mucous membranes CV:   tachycardic. No m/r/g. Peripheral pulses 2+. Capillary refill <2s. Lungs:  Scattered wheezes b/l Abdomen: Soft, nontender, nondistended. Extremities: Warm and dry. No cyanosis or edema. Neurologic: CN II-XII grossly intact. Sensation intact. Skin:     No rashes or jaundice. Normal coloration Psych:  Normal mood and affect. I reviewed the labs, imaging, EKGs, telemetry, and other studies done this admission. Data Review:  
Recent Results (from the past 24 hour(s)) EKG, 12 LEAD, INITIAL Collection Time: 10/12/18  3:03 PM  
Result Value Ref Range Ventricular Rate 97 BPM  
 Atrial Rate 97 BPM  
 P-R Interval 154 ms QRS Duration 120 ms  
 Q-T Interval 368 ms QTC Calculation (Bezet) 467 ms Calculated P Axis 80 degrees Calculated R Axis 147 degrees Calculated T Axis 74 degrees Diagnosis Normal sinus rhythm Biatrial enlargement Pulmonary disease pattern Right bundle branch block , plus right ventricular hypertrophy Abnormal ECG When compared with ECG of 07-JUN-2018 15:02, No significant change was found Confirmed by SAUL BENNETT (), Alix Flowers ([id number]) on 10/12/2018 5:03:50 PM 
  
CBC WITH AUTOMATED DIFF Collection Time: 10/12/18  3:42 PM  
Result Value Ref Range WBC 8.5 4.3 - 11.1 K/uL  
 RBC 3.58 (L) 4.05 - 5.2 M/uL HGB 9.8 (L) 11.7 - 15.4 g/dL HCT 33.8 (L) 35.8 - 46.3 % MCV 94.4 79.6 - 97.8 FL  
 MCH 27.4 26.1 - 32.9 PG  
 MCHC 29.0 (L) 31.4 - 35.0 g/dL  
 RDW 14.4 % PLATELET 065 749 - 724 K/uL MPV 9.8 9.4 - 12.3 FL ABSOLUTE NRBC 0.00 0.0 - 0.2 K/uL  
 DF AUTOMATED NEUTROPHILS 74 43 - 78 % LYMPHOCYTES 16 13 - 44 % MONOCYTES 9 4.0 - 12.0 % EOSINOPHILS 0 (L) 0.5 - 7.8 % BASOPHILS 0 0.0 - 2.0 % IMMATURE GRANULOCYTES 0 0.0 - 5.0 %  
 ABS. NEUTROPHILS 6.3 1.7 - 8.2 K/UL  
 ABS. LYMPHOCYTES 1.3 0.5 - 4.6 K/UL  
 ABS. MONOCYTES 0.8 0.1 - 1.3 K/UL  
 ABS. EOSINOPHILS 0.0 0.0 - 0.8 K/UL  
 ABS. BASOPHILS 0.0 0.0 - 0.2 K/UL  
 ABS. IMM. GRANS. 0.0 0.0 - 0.5 K/UL METABOLIC PANEL, COMPREHENSIVE Collection Time: 10/12/18  3:42 PM  
Result Value Ref Range Sodium 139 136 - 145 mmol/L Potassium 3.5 3.5 - 5.1 mmol/L Chloride 99 98 - 107 mmol/L  
 CO2 38 (H) 21 - 32 mmol/L Anion gap 2 (L) 7 - 16 mmol/L Glucose 99 65 - 100 mg/dL BUN 15 8 - 23 MG/DL Creatinine 0.79 0.6 - 1.0 MG/DL  
 GFR est AA >60 >60 ml/min/1.73m2 GFR est non-AA >60 >60 ml/min/1.73m2 Calcium 9.4 8.3 - 10.4 MG/DL Bilirubin, total 0.3 0.2 - 1.1 MG/DL  
 ALT (SGPT) 22 12 - 65 U/L  
 AST (SGOT) 20 15 - 37 U/L Alk. phosphatase 81 50 - 136 U/L Protein, total 7.7 6.3 - 8.2 g/dL Albumin 3.5 3.2 - 4.6 g/dL Globulin 4.2 (H) 2.3 - 3.5 g/dL  A-G Ratio 0.8 (L) 1.2 - 3.5    
TROPONIN I  
 Collection Time: 10/12/18  3:42 PM  
Result Value Ref Range Troponin-I, Qt. 0.03 0.02 - 0.05 NG/ML  
POC G3 Collection Time: 10/12/18  4:23 PM  
Result Value Ref Range Device: NASAL CANNULA pH (POC) 7.345 (L) 7.35 - 7.45    
 pCO2 (POC) 67.5 (H) 35 - 45 MMHG  
 pO2 (POC) 105 80 - 105 MMHG  
 HCO3 (POC) 36.8 (H) 22 - 26 MMOL/L  
 sO2 (POC) 97 95 - 98 % Base excess (POC) 9 mmol/L Allens test (POC) YES Site RIGHT RADIAL Patient temp. 98.6 Specimen type (POC) ARTERIAL Performed by [place]   
 CO2, POC 39 MMOL/L Flow rate (POC) 3.500 L/min Respiratory comment: 1 All Micro Results None No current facility-administered medications for this encounter. Current Outpatient Prescriptions Medication Sig  
 albuterol (PROVENTIL HFA, VENTOLIN HFA, PROAIR HFA) 90 mcg/actuation inhaler Take 2 Puffs by inhalation every four (4) hours as needed for Wheezing.  albuterol-ipratropium (DUO-NEB) 2.5 mg-0.5 mg/3 ml nebu 3 mL by Nebulization route four (4) times daily.  budesonide (PULMICORT) 0.5 mg/2 mL nbsp 2 mL by Nebulization route two (2) times a day.  gabapentin (NEURONTIN) 100 mg capsule Take 1 Cap by mouth nightly.  sertraline (ZOLOFT) 100 mg tablet Take 1 Tab by mouth daily.  traMADol (ULTRAM) 50 mg tablet TAKE ONE TABLET BY MOUTH EVERY 6 HOURS AS NEEDED FOR PAIN (MAX  DAILY  AMOUNT  200MG)  metoprolol succinate (TOPROL-XL) 50 mg XL tablet Take 1 Tab by mouth daily.  OXYGEN-AIR DELIVERY SYSTEMS by Does Not Apply route. 2.5 L cont.  aspirin delayed-release 81 mg tablet Take 1 Tab by mouth daily. Other Studies: Xr Chest [facility] Result Date: 10/12/2018 Single portable upright chest x-ray October 12, 2018 Reference exam: June 6, 2018 INDICATION: Short of breath FINDINGS: Median sternotomy wires are seen, cardiac silhouette and pulmonary vascularity and lung fields appear normal.  
 
IMPRESSION: Lungs appear clear. Assessment and Plan:  
 
Hospital Problems as of 10/12/2018  Date Reviewed: 9/10/2018 Codes Class Noted - Resolved POA  
 COPD, frequent exacerbations ([facility] ICD-10-CM: J44.1 ICD-9-CM: 491.21  10/12/2018 - Present Unknown Coronary atherosclerosis (Chronic) ICD-10-CM: I25.10 ICD-9-CM: 414.00  8/19/2013 - Present Yes Essential hypertension, benign (Chronic) ICD-10-CM: I10 
ICD-9-CM: 401.1  1/2/2013 - Present Yes Plan: # Acute COPD exacerbation - Two exacerbations since July but never been intubated - Will try IV steroids and doxy with nebs; CXR clear - Needs maintenance inhalers - Consider pulm eval but at this point she is stable and no longer hypoxic # Anemia - Hb 6/2018 was 13.3 but today is 9.8 
 - BUN not elevated; no melena or bruising - Unsure why it's lower, will repeat CBC tomorrow # CAD 
 - ASA # dCHF 
 - Euvolemic, BB # MDD/anxiety - Zoloft Discharge planning:  CM 
DVT ppx: Lovenox Code status:  Full Estimated LOS:  Greater than 2 midnights Risk:  high Signed: 
Delia Mulligan MD

## 2018-10-12 NOTE — IP AVS SNAPSHOT
Artie Crowley 
 
 
 [address] [facility] 
[phone] Patient: Jama Ganser MRN: [id number] About your hospitalization You were admitted on:  October 12, 2018 You last received care in the:  [facility] 8 MED SURG You were discharged on:  October 16, 2018 Why you were hospitalized Your primary diagnosis was:  Copd, Frequent Exacerbations (Hcc) Your diagnoses also included:  Essential Hypertension, Benign, Coronary Atherosclerosis, Carotid Stenosis, Cigarette Nicotine Dependence Without Complication, Cvd (Cerebrovascular Disease), Diastolic Heart Failure (Hcc), Rbbb, S/P Avr (Aortic Valve Replacement), Dnr (Do Not Resuscitate) Follow-up Information Follow up With Details Comments Contact Info [address] [address] 
[phone] Haris Liu MD On 10/23/2018 12:15 PM [facility] [address] 
[phone] Abiodun Ryan NP On 10/30/2018 8:10 AM [address] [address] [place] [facility] [address] [facility] 
[phone] Your Scheduled Appointments Tuesday October 23, 2018 12:15 PM EDT Extended Office Visit with Haris Liu MD  
[facility] ([address] [place] [address]) Donell Anguiano [id number]  
[phone] Tuesday October 30, 2018  8:40 AM EDT  
(Arrive by 8:10 AM) HOSPITAL with [facility] ([facility]) [address] [facility] [address] [facility]  
[phone] Friday November 02, 2018  4:00 PM EDT Extended Office Visit with Haris Liu MD  
[facility] ([address] [place] [address]) Donell Anguiano [address]  
[phone] Discharge Orders None A check nicol indicates which time of day the medication should be taken. My Medications START taking these medications Instructions Each Dose to Equal  
 Morning Noon Evening Bedtime  
 levoFLOXacin 500 mg tablet Commonly known as:  Deepa Scanlon Your next dose is:  Tomorrow around noon Take 1 Tab by mouth every twenty-four (24) hours for 2 days. 500 mg  
    
   
  
   
   
  
 nicotine 14 mg/24 hr patch Commonly known as:  Cassandra Tran Your next dose is: Take tonight 1 Patch by TransDERmal route every twenty-four (24) hours for 30 days. 1 Patch  
    
   
   
   
  
  
 pantoprazole 40 mg tablet Commonly known as:  PROTONIX Your next dose is:  Tomorrow Morning Take 1 Tab by mouth daily for 5 days. 40 mg  
    
  
   
   
   
  
 predniSONE 10 mg tablet Commonly known as:  Mike Fan Your next dose is:  Tomorrow Morning Take 2 Tabs by mouth daily (with breakfast) for 5 days. 20 mg CONTINUE taking these medications Instructions Each Dose to Equal  
 Morning Noon Evening Bedtime  
 albuterol 90 mcg/actuation inhaler Commonly known as:  PROVENTIL HFA, VENTOLIN HFA, PROAIR HFA Take 2 Puffs by inhalation every four (4) hours as needed for Wheezing. 2 Puff  
    
   
   
   
  
 albuterol-ipratropium 2.5 mg-0.5 mg/3 ml Nebu Commonly known as:  Dunia Niobrara Your next dose is: Today around noon 3 mL by Nebulization route four (4) times daily. 3 mL  
    
  
   
  
   
  
   
  
  
 aspirin delayed-release 81 mg tablet Your next dose is:  Tomorrow Morning Take 1 Tab by mouth daily. 81 mg  
    
  
   
   
   
  
 budesonide 0.5 mg/2 mL Nbsp Commonly known as:  PULMICORT Your next dose is: This evening 2 mL by Nebulization route two (2) times a day. 500 mcg  
    
  
   
   
   
  
 gabapentin 100 mg capsule Commonly known as:  NEURONTIN  
 Your next dose is: Take tonight Take 1 Cap by mouth nightly. 100 mg  
    
   
   
   
  
  
 metoprolol succinate 50 mg XL tablet Commonly known as:  TOPROL-XL Your next dose is:  Tomorrow Morning Take 1 Tab by mouth daily. 50 mg OXYGEN-AIR DELIVERY SYSTEMS  
   
 by Does Not Apply route. 2.5 L cont. sertraline 100 mg tablet Commonly known as:  ZOLOFT Your next dose is:  Tomorrow Morning Take 1 Tab by mouth daily. 100 mg Where to Get Your Medications These medications were sent to [facility] [address] [address], [address] [address] Phone:  [phone]  
  albuterol 90 mcg/actuation inhaler Information on where to get these meds will be given to you by the nurse or doctor. ! Ask your nurse or doctor about these medications  
  levoFLOXacin 500 mg tablet  
 nicotine 14 mg/24 hr patch  
 pantoprazole 40 mg tablet  
 predniSONE 10 mg tablet Discharge Instructions Stop smoking. Stop use of tobacco products. If worsened, call your doctor or return to hospital.  
When follow up with your doctor, make sure that your doctor is aware of this admission and review hospital record and follow up on lab pr other investigation results for continuity of care. Also, review your medications with your doctor for possible need of adjustment or refills. Chronic Obstructive Pulmonary Disease (COPD): Care Instructions Your Care Instructions Chronic obstructive pulmonary disease (COPD) is a general term for a group of lung diseases, including emphysema and chronic bronchitis. People with COPD have decreased airflow in and out of the lungs, which makes it hard to breathe. The airways also can get clogged with thick mucus. Cigarette smoking is a major cause of COPD. Although there is no cure for COPD, you can slow its progress. Following your treatment plan and taking care of yourself can help you feel better and live longer. Follow-up care is a key part of your treatment and safety. Be sure to make and go to all appointments, and call your doctor if you are having problems. It's also a good idea to know your test results and keep a list of the medicines you take. How can you care for yourself at home? 
 Staying healthy 
  · Do not smoke. This is the most important step you can take to prevent more damage to your lungs. If you need help quitting, talk to your doctor about stop-smoking programs and medicines. These can increase your chances of quitting for good.  
  · Avoid colds and flu. Get a pneumococcal vaccine shot. If you have had one before, ask your doctor whether you need a second dose. Get the flu vaccine every fall. If you must be around people with colds or the flu, wash your hands often.  
  · Avoid secondhand smoke, air pollution, and high altitudes. Also avoid cold, dry air and hot, humid air. Stay at home with your windows closed when air pollution is bad.  
 Medicines and oxygen therapy 
  · Take your medicines exactly as prescribed. Call your doctor if you think you are having a problem with your medicine.  
  · You may be taking medicines such as: ¨ Bronchodilators. These help open your airways and make breathing easier. Bronchodilators are either short-acting (work for 6 to 9 hours) or long-acting (work for 24 hours). You inhale most bronchodilators, so they start to act quickly. Always carry your quick-relief inhaler with you in case you need it while you are away from home. ¨ Corticosteroids (prednisone, budesonide). These reduce airway inflammation. They come in pill or inhaled form. You must take these medicines every day for them to work well.  
  · A spacer may help you get more inhaled medicine to your lungs.  Ask your doctor or pharmacist if a spacer is right for you. If it is, ask how to use it properly.  
  · Do not take any vitamins, over-the-counter medicine, or herbal products without talking to your doctor first.  
  · If your doctor prescribed antibiotics, take them as directed. Do not stop taking them just because you feel better. You need to take the full course of antibiotics.  
  · Oxygen therapy boosts the amount of oxygen in your blood and helps you breathe easier. Use the flow rate your doctor has recommended, and do not change it without talking to your doctor first.  
Activity 
  · Get regular exercise. Walking is an easy way to get exercise. Start out slowly, and walk a little more each day.  
  · Pay attention to your breathing. You are exercising too hard if you cannot talk while you are exercising.  
  · Take short rest breaks when doing household chores and other activities.  
  · Learn breathing methodssuch as breathing through pursed lipsto help you become less short of breath.  
  · If your doctor has not set you up with a pulmonary rehabilitation program, talk to him or her about whether rehab is right for you. Rehab includes exercise programs, education about your disease and how to manage it, help with diet and other changes, and emotional support. Diet 
  · Eat regular, healthy meals. Use bronchodilators about 1 hour before you eat to make it easier to eat. Eat several small meals instead of three large ones. Drink beverages at the end of the meal. Avoid foods that are hard to chew.  
  · Eat foods that contain protein so that you do not lose muscle mass.  
  · Talk with your doctor if you gain too much weight or if you lose weight without trying.  
 Mental health 
  · Talk to your family, friends, or a therapist about your feelings. It is normal to feel frightened, angry, hopeless, helpless, and even guilty. Talking openly about bad feelings can help you cope.  If these feelings last, talk to your doctor. When should you call for help? Call 911 anytime you think you may need emergency care. For example, call if: 
  · You have severe trouble breathing.  
 Call your doctor now or seek immediate medical care if: 
  · You have new or worse trouble breathing.  
  · You cough up blood.  
  · You have a fever.  
 Watch closely for changes in your health, and be sure to contact your doctor if: 
  · You cough more deeply or more often, especially if you notice more mucus or a change in the color of your mucus.  
  · You have new or worse swelling in your legs or belly.  
  · You are not getting better as expected. Where can you learn more? Go to http://grabiel-jeni.info/. Herma Duverney in the search box to learn more about \"Chronic Obstructive Pulmonary Disease (COPD): Care Instructions. \" Current as of: December 6, 2017 Content Version: 11.8 © 9984-5233 [employer]. Care instructions adapted under license by [employer] (which disclaims liability or warranty for this information). If you have questions about a medical condition or this instruction, always ask your healthcare professional. [address] [place] [address] any warranty or liability for your use of this information. DISCHARGE SUMMARY from Nurse PATIENT INSTRUCTIONS: 
 
After general anesthesia or intravenous sedation, for 24 hours or while taking prescription Narcotics: · Limit your activities · Do not drive and operate hazardous machinery · Do not make important personal or business decisions · Do  not drink alcoholic beverages · If you have not urinated within 8 hours after discharge, please contact your surgeon on call. Report the following to your surgeon: 
· Excessive pain, swelling, redness or odor of or around the surgical area · Temperature over 100.5 · Nausea and vomiting lasting longer than 4 hours or if unable to take medications · Any signs of decreased circulation or nerve impairment to extremity: change in color, persistent  numbness, tingling, coldness or increase pain · Any questions What to do at Home: *  Please give a list of your current medications to your Primary Care Provider. *  Please update this list whenever your medications are discontinued, doses are 
    changed, or new medications (including over-the-counter products) are added. *  Please carry medication information at all times in case of emergency situations. These are general instructions for a healthy lifestyle: No smoking/ No tobacco products/ Avoid exposure to second hand smoke Surgeon General's Warning:  Quitting smoking now greatly reduces serious risk to your health. Obesity, smoking, and sedentary lifestyle greatly increases your risk for illness A healthy diet, regular physical exercise & weight monitoring are important for maintaining a healthy lifestyle You may be retaining fluid if you have a history of heart failure or if you experience any of the following symptoms:  Weight gain of 3 pounds or more overnight or 5 pounds in a week, increased swelling in our hands or feet or shortness of breath while lying flat in bed. Please call your doctor as soon as you notice any of these symptoms; do not wait until your next office visit. Recognize signs and symptoms of STROKE: 
 
F-face looks uneven A-arms unable to move or move unevenly S-speech slurred or non-existent T-time-call 911 as soon as signs and symptoms begin-DO NOT go Back to bed or wait to see if you get better-TIME IS BRAIN. Warning Signs of HEART ATTACK Call 911 if you have these symptoms: 
? Chest discomfort. Most heart attacks involve discomfort in the center of the chest that lasts more than a few minutes, or that goes away and comes back. It can feel like uncomfortable pressure, squeezing, fullness, or pain. ? Discomfort in other areas of the upper body. Symptoms can include pain or discomfort in one or both arms, the back, neck, jaw, or stomach. ? Shortness of breath with or without chest discomfort. ? Other signs may include breaking out in a cold sweat, nausea, or lightheadedness. Don't wait more than five minutes to call [address]! Fast action can save your life. Calling 911 is almost always the fastest way to get lifesaving treatment. Emergency Medical Services staff can begin treatment when they arrive  up to an hour sooner than if someone gets to the hospital by car. The discharge information has been reviewed with the patient. The patient verbalized understanding. Discharge medications reviewed with the patient and appropriate educational materials and side effects teaching were provided. ___________________________________________________________________________________________________________________________________ ACO Transitions of Care Introducing [facility] [address] offers a voluntary care coordination program to provide high quality service and care to [facility] fee-for-service beneficiaries. Laith Saavedra was designed to help you enhance your health and well-being through the following services: ? Transitions of Care  support for individuals who are transitioning from one care setting to another (example: Hospital to home). ? Chronic and Complex Care Coordination  support for individuals and caregivers of those with serious or chronic illnesses or with more than one chronic (ongoing) condition and those who take a number of different medications. If you meet specific medical criteria, a [facility] [address] may call you directly to coordinate your care with your primary care physician and your other care providers.  
 
For questions about the [facility] programs, please, contact your physicians office. For general questions or additional information about Accountable Care Organizations: 
Please visit www.medicare.gov/acos. html or call 1-800-MEDICARE (3-264.552.3950) TTY users should call 9-677.934.2477. [employer] Announcement We are excited to announce that we are making your provider's discharge notes available to you in [employer]. You will see these notes when they are completed and signed by the physician that discharged you from your recent hospital stay. If you have any questions or concerns about any information you see in [employer], please call the Health Information Department where you were seen or reach out to your Primary Care Provider for more information about your plan of care. Introducing [facility]! [employer] introduces [employer] patient portal. Now you can access parts of your medical record, email your doctor's office, and request medication refills online. 1. In your internet browser, go to [email or web address] 2. Click on the First Time User? Click Here link in the Sign In box. You will see the New Member Sign Up page. 3. Enter your [employer] Access Code exactly as it appears below. You will not need to use this code after youve completed the sign-up process. If you do not sign up before the expiration date, you must request a new code. · [employer] Access Code: [email or web address] Expires: 12/9/2018  1:46 PM 
 
4. Enter the last four digits of your Social Security Number (xxxx) and Date of Birth (mm/dd/yyyy) as indicated and click Submit. You will be taken to the next sign-up page. 5. Create a [employer] ID. This will be your [employer] login ID and cannot be changed, so think of one that is secure and easy to remember. 6. Create a [employer] password. You can change your password at any time. 7. Enter your Password Reset Question and Answer. This can be used at a later time if you forget your password. 8. Enter your e-mail address. You will receive e-mail notification when new information is available in [address] 9. Click Sign Up. You can now view and download portions of your medical record. 10. Click the Download Summary menu link to download a portable copy of your medical information. If you have questions, please visit the Frequently Asked Questions section of the [employer] website. Remember, [employer] is NOT to be used for urgent needs. For medical emergencies, dial 911. Now available from your iPhone and Android! Introducing Todd Burciaga As a [employer] patient, I wanted to make you aware of our electronic visit tool called Todd Burciaga. [employer] 24/7 allows you to connect within minutes with a medical provider 24 hours a day, seven days a week via a mobile device or tablet or logging into a secure website from your computer. You can access Todd Burciaga from anywhere in the [place]. A virtual visit might be right for you when you have a simple condition and feel like you just dont want to get out of bed, or cant get away from work for an appointment, when your regular [employer] provider is not available (evenings, weekends or holidays), or when youre out of town and need minor care. Electronic visits cost only $49 and if the [employer] 24/7 provider determines a prescription is needed to treat your condition, one can be electronically transmitted to a nearby pharmacy*. Please take a moment to enroll today if you have not already done so. The enrollment process is free and takes just a few minutes. To enroll, please download the [employer] 24/7 alicia to your tablet or phone, or visit [email or web address] to enroll on your computer.    
And, as an [address] [place] [address] patient with a [employer] account, the results of your visits will be scanned into your electronic medical record and your primary care provider will be able to view the scanned results. We urge you to continue to see your regular [employer] provider for your ongoing medical care. And while your primary care provider may not be the one available when you seek a [employer] virtual visit, the peace of mind you get from getting a real diagnosis real time can be priceless. For more information on [employer], view our Frequently Asked Questions (FAQs) at [email or web address]. Sincerely, 
 
Ayesha Holly MD 
Chief Medical Officer Linda8 Dolly Dubon *:  certain medications cannot be prescribed via [employer] Providers Seen During Your Hospitalization Provider Specialty Primary office phone Estefany Levin MD Emergency Medicine [phone] Familia Contreras MD Internal Medicine [phone] Immunizations Administered for This Admission Name Date Influenza Vaccine (Quad) PF  Deferred () Your Primary Care Physician (PCP) Primary Care Physician Office Phone Office Fax Eleazar Puris [phone] You are allergic to the following Allergen Reactions Demerol (Meperidine) Other (comments) Low blood pressure Gabapentin Other (comments) Heart Racing Lortab (Hydrocodone-Acetaminophen) Swelling HYPOTENSION Statins-Hmg-Coa Reductase Inhibitors Unknown (comments) Recent Documentation Height Weight BMI OB Status Smoking Status 1.6 m 49.9 kg 19.49 kg/m2 Hysterectomy Current Every Day Smoker Emergency Contacts Name Discharge Info Relation Home Work Mobile Fernando Loera  Spouse [3] [phone] Patient Belongings The following personal items are in your possession at time of discharge: 
  Dental Appliances: Uppers, At bedside  Visual Aid: Glasses, With patient      Home Medications: None   Jewelry: Bracelet  Clothing: Shirt, Pants, Undergarments    Other Valuables: None Please provide this summary of care documentation to your next provider. Signatures-by signing, you are acknowledging that this After Visit Summary has been reviewed with you and you have received a copy. Patient Signature:  ____________________________________________________________ Date:  ____________________________________________________________  
  
[facility] Pool Provider Signature:  ____________________________________________________________ Date:  ____________________________________________________________

## 2018-10-12 NOTE — PROGRESS NOTES
TRANSFER - IN REPORT: 
 
Verbal report received from Luis Eduardo RN(name) on Christie Rhodes  being received from ER(unit) for routine progression of care Report consisted of patients Situation, Background, Assessment and  
Recommendations(SBAR). Information from the following report(s) SBAR, ED Summary, [facility] and Recent Results was reviewed with the receiving nurse. Opportunity for questions and clarification was provided. Assessment completed upon patients arrival to unit and care assumed.

## 2018-10-12 NOTE — ED PROVIDER NOTES
HPI Comments: 71-year-old with a history of COPD presents with concerns about wheezing and shortness of breath. Says that she has used her albuterol inhaler at home without much relief. She says she's had a mild cough that has been nonproductive. She has not had any fevers or chills. Overall she says that she can't get her breath even when she is wearing her usual home O2. Elements of this note were created using speech recognition software. As such, errors of speech recognition may be present. Patient is a 77 y.o. female presenting with shortness of breath. The history is provided by the patient. Shortness of Breath Associated symptoms include cough and wheezing. Pertinent negatives include no fever, no headaches, no rhinorrhea, no sore throat, no chest pain, no vomiting, no abdominal pain and no rash. Past Medical History:  
Diagnosis Date  Acute respiratory failure ([facility] [address] 10/2016  
 required intubation at [facility]  Anemia ANEMIA /GI BLEED 1- PRBC 2 UNITS  Arrhythmia HX SVT  CAD (coronary artery disease) aortic valve stenosis pending repair/replacement  Cancer ([facility] [address] 6/2012 BREAST CANCER RIGHT  Cholelithiasis 1/2/2013  COPD exacerbation ([facility] [address] 1/3/2013  Systolic murmur 5/8/8272  Unspecified adverse effect of anesthesia OVERSEDATION, SEVERE HYPOTENSION Past Surgical History:  
Procedure Laterality Date  CARDIAC SURG PROCEDURE UNLIST  2/8/2012  HX AORTIC VALVE REPLACEMENT  2/28/13 Dr. Cyndy Spence  HX BREAST LUMPECTOMY  6/2012 8/2012 RIGHT  HX CAROTID ENDARTERECTOMY  12/8/2008 RIGHT  HX CAROTID ENDARTERECTOMY Left 10/2016 Dr. Nick Power at [facility]; 2008  HX LAP CHOLECYSTECTOMY  09/2010  HX VASCULAR ACCESS  1/2013  
 pic line placed and removed Family History:  
Problem Relation Age of Onset  Stroke Mother  Lung Disease Father   
  Aniya Ortega  Hypertension Other Social History Social History  Marital status:  Spouse name: N/A  
 Number of children: N/A  
 Years of education: N/A Occupational History  retired Retired  
  sales at [employer] Social History Main Topics  Smoking status: Current Every Day Smoker Packs/day: 0.25 Years: 50.00 Types: Cigarettes  Smokeless tobacco: Never Used Comment: Pt states she smokes about 8 a day.  Alcohol use No  
 Drug use: No  
 Sexual activity: No  
 
Other Topics Concern  Not on file Social History Narrative . Lives with . She is originally from [place]. ALLERGIES: Demerol [meperidine]; Gabapentin; Lortab [hydrocodone-acetaminophen]; Percocet [oxycodone-acetaminophen]; and Statins-hmg-coa reductase inhibitors Review of Systems Constitutional: Positive for fatigue. Negative for chills, diaphoresis and fever. HENT: Negative for congestion, rhinorrhea and sore throat. Eyes: Negative for redness and visual disturbance. Respiratory: Positive for cough, shortness of breath and wheezing. Negative for chest tightness. Cardiovascular: Negative for chest pain and palpitations. Gastrointestinal: Negative for abdominal pain, blood in stool, diarrhea, nausea and vomiting. Endocrine: Negative for polydipsia and polyuria. Genitourinary: Negative for dysuria and hematuria. Musculoskeletal: Negative for arthralgias, myalgias and neck stiffness. Skin: Negative for rash. Allergic/Immunologic: Negative for environmental allergies and food allergies. Neurological: Negative for dizziness, weakness and headaches. Hematological: Negative for adenopathy. Does not bruise/bleed easily. Psychiatric/Behavioral: Negative for confusion and sleep disturbance. The patient is not nervous/anxious. Vitals:  
 10/12/18 1609 10/12/18 1618 10/12/18 1639 10/12/18 1658 BP: 123/63 120/60 124/73 141/77 Pulse: 95 97 100 (!) 101 Resp:      
 Temp:      
SpO2: 97% 91%  97% Weight:      
Height:      
      
 
Physical Exam  
Constitutional: She is oriented to person, place, and time. She appears well-developed and well-nourished. HENT:  
Head: Normocephalic and atraumatic. Eyes: Conjunctivae and EOM are normal. Pupils are equal, round, and reactive to light. Neck: Normal range of motion. Cardiovascular: Normal rate and regular rhythm. Pulmonary/Chest: Effort normal. No respiratory distress. She has wheezes. She has no rales. She exhibits no tenderness. Diffuse bilateral wheezes Abdominal: Soft. Bowel sounds are normal. There is no rebound and no guarding. Musculoskeletal: Normal range of motion. She exhibits no edema or tenderness. Lymphadenopathy:  
  She has no cervical adenopathy. Neurological: She is alert and oriented to person, place, and time. Skin: Skin is warm and dry. Psychiatric: She has a normal mood and affect. Nursing note and vitals reviewed. MDM Number of Diagnoses or Management Options Diagnosis management comments: Patient received a treatment with EMS. I gave her a DuoNeb treatment and steroids here. After that she has not had much in the way of a significant improvement. ABG does show some CO2 retention and a low PO2. Her x-ray does not show any significant fluid or pneumonia. I will give her a third treatment. ED Course Procedures

## 2018-10-12 NOTE — ED NOTES
TRANSFER - OUT REPORT: 
 
Verbal report given to Nader Falk RN (name) on Larry Iniguez  being transferred to [facility] [phone] (unit) for routine progression of care Report consisted of patients Situation, Background, Assessment and  
Recommendations(SBAR). Information from the following report(s) SBAR, ED Summary, [facility] and Recent Results was reviewed with the receiving nurse. Lines:  
Peripheral IV 10/12/18 Right Antecubital (Active) Site Assessment Clean, dry, & intact 10/12/2018  3:44 PM  
Phlebitis Assessment 0 10/12/2018  3:44 PM  
Infiltration Assessment 0 10/12/2018  3:44 PM  
Dressing Status Clean, dry, & intact 10/12/2018  3:44 PM  
Dressing Type Transparent 10/12/2018  3:44 PM  
  
 
Opportunity for questions and clarification was provided. Patient transported with: 
 [employer]

## 2018-10-12 NOTE — ED NOTES
Patient to ED via EMS from home with c/c [facility] [place]. Patient reports increasing SHOB over the past few days. Patient with history of COPD, reports on home O2 4.5L. Patient reports increasing cough over past few weeks, scant mucous production. Patient denies any CP or other discomfort. Patient denies any swelling to feet/ankles. EMS reports patient initially with scattered wheezes. Patient given albuterol neb x 2, atrovent x 1 with improvement.   
 
At time of triage, patient with O2 sat 99% on 5LPM via NC.

## 2018-10-13 NOTE — PROGRESS NOTES
Visit with patient to build rapport with . Patient is calm. Receptive to  presence. Encouraged and assured patient of our continued care. Very pleasant Tarun Click,  Staff  C: [phone]  /  [email or web address]

## 2018-10-13 NOTE — PROGRESS NOTES
Admission assessment completed. Pt alert and oriented times four at this time. Scattered wheezing lung sounds noted. Pt on 3L NC. Pt has tremors. Dual skin assessment completed with Suzie Sanchez RN and Maryjo Molina RN. Pt ecchymotic on bilateral upper and lower extremeties. Other wise skin is warm, dry, and intact. Pt oriented to room and surroundings. Call light within reach. Pt instructed to call for assistance. Will monitor.

## 2018-10-13 NOTE — PROGRESS NOTES
Bedside and Verbal shift change report received from [facility]. Report included the following information SBAR, Kardex, MAR and Recent Results. assumed care of patient.

## 2018-10-13 NOTE — PROGRESS NOTES
. Lives with . She is originally from [place] Per notes Ben Higginbotham, staff Zulema bernardo [address], [address]  /   [email or web address]

## 2018-10-13 NOTE — PROGRESS NOTES
Progress Note Patient: Gt Taylor MRN: [id number]  SSN: [id number] YOB: 1940  Age: 77 y.o. Sex: female Admit Date: 10/12/2018 LOS: 1 day Subjective:  
Cc: \" I still have sob\" Patient examined at bedside. She was noted in mild distress due to sob and cough. She was admitted overnight in view of COPD exacerbation. She is a chronic smoker. Her last use of prednisone and antibiotics was around 1 month ago. Under my assessment she denied chest pain. She had productive phlegm. Objective:  
 
Vitals:  
 10/13/18 1144 10/13/18 0406 10/13/18 0715 10/13/18 6420 BP: 119/73  106/71 Pulse: 75  (!) 106 Resp: 19  18 Temp: 97.9 °F (36.6 °C)  97.6 °F (36.4 °C) SpO2: 95% 95% 99% 95% Weight:      
Height:      
  
 
Intake and Output: 
Current Shift:   
Last three shifts: 10/11 1901 - 10/13 0700 In: 451 [P.O.:440; I.V.:11] Out: - Physical Exam:  
GENERAL: alert, cooperative, no distress, appears stated age EYE: negative LYMPHATIC: Cervical, supraclavicular, and axillary nodes normal.  
THROAT & NECK: normal and no erythema or exudates noted. LUNG: bilateral diminished air entry, wheezing, rhonchi. Saturating 95% on 3 liters NC  
HEART: tachycardia, regular rate and rhythm, S1, S2 normal, no murmur, click, rub or gallop ABDOMEN: soft, non-tender. Bowel sounds normal. No masses,  no organomegaly EXTREMITIES:  extremities normal, atraumatic, no cyanosis or edema SKIN: Normal. 
NEUROLOGIC: negative PSYCHIATRIC: non focal 
 
Lab/Data Review: All lab results for the last 24 hours reviewed. Assessment:  
 
Active Problems: 
  Essential hypertension, benign (1/2/2013) Coronary atherosclerosis (8/19/2013) COPD, frequent exacerbations ([facility] [address] (10/12/2018) Plan:  
-Acute COPD exacerbation: patient uses 4 liters home oxygen, currently around 3-4 - continue duonebs, pulmicort, mucinex, increase solumedrol to 40 mg iv q 6hr - start levaquin in view of productive cough - flutter valve - if her condition do not improve in 48 hrs may call pulmonary service in 
                       
- Chronic Anemia: stable - monitor  
                     
-CAD: asymptomatic, monitor  
  
-dCHF: not in exacerbation - continue home meds  
                    
  
-MDD/anxiety: home meds DVT ppx: Lovenox Code status:  Full Disposition: home one medically stable Signed By: Cammie Elaine MD   
 October 13, 2018

## 2018-10-13 NOTE — PROGRESS NOTES
MIDLINE Placement Note PRE-PROCEDURE VERIFICATION 
PROCEDURE DETAIL Time out completed with Herminio Parra rn in agreement with time out and everyone in agreement with procedure. A single lumen Midline was started for antibiotic therapy. The following documentation is in addition to the Midline properties in the lines/airways flowsheet : 
Lot #: [id number] Xylocaine used: yes Mid-Arm Circumference: 23 (cm) Internal Catheter Length: 8 (cm) Internal Catheter Total Length: 8 (cm) Vein Selection for Midline:left brachial 
 
Line is okay to use:  
 
Hakan So RN

## 2018-10-13 NOTE — PROGRESS NOTES
Bedside and Verbal shift change report given to Jeanine Hebert (oncoming nurse) by self, Cookie Wall (offgoing nurse). Report included the following information SBAR, Kardex, MAR and Recent Results. Oncoming nurse assumed care.

## 2018-10-14 NOTE — PROGRESS NOTES
Pt has increased SOB. Given breathing treatment and solumedrol. Lung sounds are course with wheezing brady noted. MD notified. New orders for ABG's.

## 2018-10-14 NOTE — PROGRESS NOTES
Pt requested Tylenol PM to aid in sleep and states she takes one dose every bedtime. Spoke with MD (Jaret) about request and MD ordered X1 dose for tonight and requested to follow up with daytime MD for PRN order.

## 2018-10-14 NOTE — PROGRESS NOTES
Progress Note Patient: Van Gaxiola MRN: [id number]  SSN: [id number] YOB: 1940  Age: 77 y.o. Sex: female Admit Date: 10/12/2018 LOS: 2 days Subjective:  
Mrs. Shikha Falk is a 78 y/o female with a h/o severe COPD on 4 liters NC at home, chronic anemia, dHF, CAD, chronic active smoker, who was admitted for COPD exacerbation. She has had multiple attacks within 3 months, requiring outpatient management with po steroids and antibiotics. She had been given Anoro a few weeks back which initially worked well but it was too expensive to continue. She cannot pay pumps, only nebulizer solutions. The patient was started on iv solumedrol, levaquin, duonebs, flutter valve. Her clinical condition has remained stable, but she still feels symptomatic. On my assessment today, she was found in mild distress due to cough and mild SOB. She was able to speak in full sentences. Objective:  
 
Vitals:  
 10/14/18 0101 10/14/18 0304 10/14/18 6710 10/14/18 6550 BP:  158/66 146/64 Pulse:  98 92 Resp:  17 18 Temp:  98.6 °F (37 °C) 98.4 °F (36.9 °C) SpO2: 97% 100% 100% 96% Weight:      
Height:      
  
 
Intake and Output: 
Current Shift:   
Last three shifts: 10/12 1901 - 10/14 0700 In: 36 [P.O.:800; I.V.:11] Out: - Physical Exam:  
GENERAL: alert, cooperative, no distress, appears stated age EYE: negative LYMPHATIC: Cervical, supraclavicular, and axillary nodes normal.  
THROAT & NECK: normal and no erythema or exudates noted. LUNG: bilateral diminished air entry, wheezing still present, rhonchi. Saturating 97% on 3 liters NC  
HEART: tachycardia, regular rate and rhythm, S1, S2 normal, no murmur, click, rub or gallop ABDOMEN: soft, non-tender. Bowel sounds normal. No masses,  no organomegaly EXTREMITIES:  extremities normal, atraumatic, no cyanosis or edema SKIN: Normal. 
NEUROLOGIC: negative PSYCHIATRIC: non focal 
 
Lab/Data Review: All lab results for the last 24 hours reviewed. Assessment:  
 
Active Problems: 
  Essential hypertension, benign (1/2/2013) Coronary atherosclerosis (8/19/2013) COPD, frequent exacerbations ([facility] [address] (10/12/2018) Plan:  
-Acute COPD exacerbation: patient uses 4 liters home oxygen, currently around 3-4 - continue duonebs, pulmicort, mucinex, solumedrol, levaquin  - flutter valve - if her condition do not improve by Monday, will involve pulmonary - Chronic Anemia: stable - monitor  
                     
-CAD: asymptomatic, monitor  
  
-dCHF: not in exacerbation - continue home meds  
                    
  
-MDD/anxiety: home meds DVT ppx: Lovenox Code status:  Full Disposition: home one medically stable Signed By: Manju Abebe MD   
 October 14, 2018

## 2018-10-14 NOTE — PROGRESS NOTES
Problem: Falls - Risk of 
Goal: *Absence of Falls Document Rainer Evens Fall Risk and appropriate interventions in the flowsheet. Outcome: Progressing Towards Goal 
Fall Risk Interventions: 
Mobility Interventions: Patient to call before getting OOB Medication Interventions: Teach patient to arise slowly, Patient to call before getting OOB History of Falls Interventions: Bed/chair exit alarm

## 2018-10-14 NOTE — PROGRESS NOTES
End of shift SBAR to be given to oncoming nurse. Patient alert and oriented x4. Pt has slept most of shift and has reported no pain. Only complaints have been of increasing shortness of breath. Bilateral breath sounds coarse with wheezing present. MD aware of pt's condition. Will continue to monitor.

## 2018-10-14 NOTE — PROGRESS NOTES
Pt is resting quietly in bed, with no complaints or issues. Resp even, unlabored. SBAR end of shift report given to oncoming RN.

## 2018-10-14 NOTE — PROGRESS NOTES
SBAR shift report received from [facility]. Pt stable. Assessment complete. Pt is lying in bed, watching tv. Resp even, unlabored. Pt is alert, orient X 4. Pt appears in no acute distress at this time. Pt is on 3L nasal cannula 02. Pt denies pain, c/o cough. Pt encouraged to call for assistance, call light in reach. Safety measures in place. Will continue to monitor

## 2018-10-14 NOTE — PROGRESS NOTES
Patient resting in bed, alert and oriented x4. Significant wheezing upon auscultation; pt reports feeling short of breath but in no visual distress. Pt on 3 L NC. Morning O2 sat was 100%. Dr. David Blue at bedside to evaluate patient. Midline IV catheter flushes well but has no blood return. Pt denies pain, bed in low, locked position with call light in reach.

## 2018-10-15 PROBLEM — Z66 DNR (DO NOT RESUSCITATE): Status: ACTIVE | Noted: 2018-01-01

## 2018-10-15 NOTE — PROGRESS NOTES
Problem: Interdisciplinary Rounds Goal: Interdisciplinary Rounds Interdisciplinary team rounds were held 10/15/2018 with the following team members:Care Management, Physical Therapy, Physician and Clinical Coordinator and the patient. Plan of care discussed. See clinical pathway and/or care plan for interventions and desired outcomes.

## 2018-10-15 NOTE — PROGRESS NOTES
Problem: Breathing Pattern - Ineffective Goal: *Absence of hypoxia Outcome: Progressing Towards Goal 
Able to turn down O2 based on high SAT

## 2018-10-15 NOTE — PROGRESS NOTES
Spoke with MD Lisbeth Irizarry) about pt respiratory status. MD ordered X1 dose of Ativan 1mg PO for anxiety. Pt non-compliant with CPAP and RT place pt back on nasal cannula.

## 2018-10-15 NOTE — PROGRESS NOTES
SBAR shift report received from Casey Ruelaswright, [facility]. Pt stable. Assessment complete. Pt is lying in bed watching tv with family member present. Resp even, unlabored. Pt is alert, orient X 4. Pt appears in no acute distress at this time. Pt is on 3L nasal cannula 02. Pt denies pain, SOB, or productive cough. Pt encouraged to call for assistance, call light in reach. Safety measures in place. Will continue to monitor

## 2018-10-15 NOTE — CONSULTS
Palliative Care Patient: Juluis Angel MRN: [id number]  SSN: [id number] YOB: 1940  Age: 77 y.o. Sex: female Date of Request: 10/15/2018 Date of Consult:  10/15/2018 Reason for Consult:  pain and symptom management and goals of care Requesting Physician: [facility] Pulmonary Assessment/Plan:  
 
Principal Diagnosis: Dyspnea  R06.00 - symptoms present up to two times weekly, some days worsen than others. Additional Diagnoses: · Acute Respiratory Failure, Unspecified  J96.00 
· Anxiety  F41.1 · Cough  R05 · Debility, Unspecified  R53.81 
· Frailty  R54 
· Encounter for Palliative Care  Z51.5 Palliative Performance Scale (PPS): 50% Medical Decision Making:  
Reviewed and summarized - Known COPD GOLD Stage IV Discussed case with appropriate providers - PC team 
Reviewed laboratory and x-ray data - Hgb upon admission was 9.8, down to 8.9 on 10/15. No prior history of low HGB. Pleasant 77year old female hospitalized for COPD acute exacerbation. Her background demonstrates remarkable resilience ~ having survived [place] invasion of [address] [place] [address]. She describes that her father escaped the "[employer] [address] and hide in the country with his wife (patient's mother), and the patient (age 4). She recalls hiding in a pile of leaves one particularly day hiding from the [facility]. She said she isn't a [employer], but recalls having many Religion friends rounded up and taken away. At the age of 20, she left [place] with $30 dollars and a one way ticket to the [address]. She wasn't  at that time, but her future  was already in the [address] (also her current  ~ age 79). She did not have any children, but has two [employer] that are like her children. She describes having a good life, and aware that she is 77. She describes having white collar positions, and rarely participated in heavy physical work.  Sadly, she recognized the damage from long term smoking now. Her goals are to improve her symptom burden and quality of life. She is at home by herself for most of the day, since her  still works. She has some increasing sadness reported, with recent change in Zoloft dosing. She feels very anxious at times secondary to her pulmonary disease. We reviewed the allergy list, and she describes that the swelling reports were directly after anesthesia. She has taken oxycodone intermittently in the past and tolerated fine. She took a dose last week, and describe less efforts/energy requirements to breath. Agreed to low dose oxycodone 5mg scheduled dose every morning. She has some issues with constipation yesterday, but much better today. Agreed to the stool softeners. Note sent to [facility] scheduling for outpatient PC as a DULCE appointment. Enhanced management of the symptom burden could be addressed with the outpatient team. The patient is agreeable to this plan. The  will drive her to the office appointment so that we can meet next visit. Total time spent with patient: 35 minutes. Subjective:  
 
History obtained from:  Patient and Chart Chief Complaint: Shortness of breath History of Present Illness:  Patient reports having a history of intermittent events of shortness of breath over the past months. She hasn't established specific triggers, but did report some problems with seasonal changes. She was able to recap her disease progress, and understands that COPD isn't curable. She has limited abilities with activities at home, and doesn't do any laundry. Her , of 79years of age, is still working FT and is the primary caregiver. No children. No remaining family. She reports intermittent cough, with some yellowish sputum. No nocturia dyspnea noted. No ankle swelling described. No reports of N/V. Positive constipation, with a suppository placed yesterday. Advance Directive: None reported. Code Status:  DNR Health Care Power of : Unknown Past Medical History:  
Diagnosis Date  Acute respiratory failure ([facility] 10/2016  
 required intubation at [facility]  Anemia ANEMIA /GI BLEED 1- PRBC 2 UNITS  Arrhythmia HX SVT  CAD (coronary artery disease) aortic valve stenosis pending repair/replacement  Cancer ([facility] 6/2012 BREAST CANCER RIGHT  Cholelithiasis 1/2/2013  COPD exacerbation ([facility] 1/3/2013  Systolic murmur 8/7/5795  Unspecified adverse effect of anesthesia OVERSEDATION, SEVERE HYPOTENSION Past Surgical History:  
Procedure Laterality Date  CARDIAC SURG PROCEDURE UNLIST  2/8/2012  HX AORTIC VALVE REPLACEMENT  2/28/13 Dr. Shantel De Guzman  HX BREAST LUMPECTOMY  6/2012 8/2012 RIGHT  HX CAROTID ENDARTERECTOMY  12/8/2008 RIGHT  HX CAROTID ENDARTERECTOMY Left 10/2016 Dr. Jason Foss at [facility]; 2008  HX LAP CHOLECYSTECTOMY  09/2010  HX VASCULAR ACCESS  1/2013  
 pic line placed and removed Family History Problem Relation Age of Onset  Stroke Mother  Lung Disease Father   
  Sonia Hoot  Hypertension Other Social History Substance Use Topics  Smoking status: Current Every Day Smoker Packs/day: 0.25 Years: 50.00 Types: Cigarettes  Smokeless tobacco: Never Used Comment: Pt states she smokes about 8 a day.  Alcohol use No  
 
Prior to Admission medications Medication Sig Start Date End Date Taking? Authorizing Provider  
albuterol-ipratropium (DUO-NEB) 2.5 mg-0.5 mg/3 ml nebu 3 mL by Nebulization route four (4) times daily. 9/10/18  Yes Nenita Dobbins NP  
gabapentin (NEURONTIN) 100 mg capsule Take 1 Cap by mouth nightly. 8/17/18  Yes Jacqueline Alejandre MD  
sertraline (ZOLOFT) 100 mg tablet Take 1 Tab by mouth daily.  8/17/18  Yes Jacqueline Alejandre MD  
 metoprolol succinate (TOPROL-XL) 50 mg XL tablet Take 1 Tab by mouth daily. 2/5/18  Yes Rianna Damon MD  
OXYGEN-AIR DELIVERY SYSTEMS by Does Not Apply route. 2.5 L cont. Yes Historical Provider  
aspirin delayed-release 81 mg tablet Take 1 Tab by mouth daily. 1/12/13  Yes FATOUMATA Retana  
albuterol (PROVENTIL HFA, VENTOLIN HFA, PROAIR HFA) 90 mcg/actuation inhaler Take 2 Puffs by inhalation every four (4) hours as needed for Wheezing. 10/11/18   Gato Bean NP  
budesonide (PULMICORT) 0.5 mg/2 mL nbsp 2 mL by Nebulization route two (2) times a day. 9/10/18   Gato Bean NP Allergies Allergen Reactions  Demerol [Meperidine] Other (comments) Low blood pressure  Gabapentin Other (comments) Heart Racing  Lortab [Hydrocodone-Acetaminophen] Swelling HYPOTENSION 
  
 Statins-Hmg-Coa Reductase Inhibitors Unknown (comments) Review of Systems: A comprehensive review of systems was negative except for: Constitutional: Positive for fatigue. Respiratory: Positive for dyspnea. Gastrointestinal: Positive for constipation. Behavioral/Psychiatric: Positive for mild depression - recent increase in her Zoloft by Dr. Sweta Rivera [facility]). Objective:  
 
Visit Vitals  /61  Pulse 95  Temp 98.1 °F (36.7 °C)  Resp 18  Ht 5' 3\" (1.6 m)  Wt 110 lb (49.9 kg)  SpO2 99%  BMI 19.49 kg/m2 Physical Exam: 
 
General:  Cooperative. Pleasant. Significant efforts noted with breathing. Able to speak 4-5 words each sentence, but then has to pause. Eyes:  Conjunctivae/corneas clear Nose: Nares normal. Septum midline. Neck: Supple, symmetrical, trachea midline, no JVD Lungs:   Moist, wet cough. Scattered rhonchi throughout. Heart:  Regular rate and rhythm, no murmur Abdomen:   Soft, non-tender, non-distended Extremities: Normal, atraumatic, no cyanosis or edema Skin: Skin color pale, with poor turgor.    
Neurologic: Nonfocal  
 Psych: Alert and oriented Assessment:  
 
Hospital Problems  Date Reviewed: 10/15/2018 Codes Class Noted POA  
 DNR (do not resuscitate) ICD-10-CM: I28 ICD-9-CM: V49.86  10/15/2018 Yes Overview Signed 10/15/2018  9:24 AM by Katelyn Dominguez NP After discussion on 10/15/2018. * (Principal)COPD, frequent exacerbations ([facility] ICD-10-CM: J44.1 ICD-9-CM: 491.21  10/12/2018 Yes CVD (cerebrovascular disease), unspecified (Chronic) ICD-10-CM: I67.9 ICD-9-CM: 437.9  9/15/2016 Yes Coronary atherosclerosis (Chronic) ICD-10-CM: I25.10 ICD-9-CM: 414.00  8/19/2013 Yes Diastolic heart failure (HCC) (Chronic) ICD-10-CM: I50.30 ICD-9-CM: 428.30  3/9/2013 Yes S/P AVR (aortic valve replacement)-2/28/13 (Chronic) ICD-10-CM: Z95.2 ICD-9-CM: V43.3  2/28/2013 Yes RBBB ICD-10-CM: I45.10 ICD-9-CM: 426.4  1/3/2013 Yes Cigarette nicotine dependence without complication (Chronic) LPI-30-CJ: V35.904 ICD-9-CM: 305.1  1/3/2013 Yes Essential hypertension, benign (Chronic) ICD-10-CM: I10 
ICD-9-CM: 401.1  1/2/2013 Yes Carotid stenosis ICD-10-CM: I65.29 ICD-9-CM: 433.10  1/2/2013 Yes Overview Signed 2/28/2013  8:35 AM by Jamie Jade NP  
  S/p R CEA 2008 (Dr. Campos Leader) Signed By: Sanju Alford NP October 15, 2018

## 2018-10-15 NOTE — PROGRESS NOTES
Pt resting quietly in bed. No complaints or issues throughout the shift. Resp even, unlabored. Pt appears in no acute distress at this time. SBAR end of shift report given to oncoming RN.

## 2018-10-15 NOTE — PROGRESS NOTES
Problem: Falls - Risk of 
Goal: *Absence of Falls Document Sneha Shadow Fall Risk and appropriate interventions in the flowsheet. Outcome: Progressing Towards Goal 
Fall Risk Interventions: 
Mobility Interventions: Patient to call before getting OOB Medication Interventions: Patient to call before getting OOB History of Falls Interventions: Room close to nurse's station

## 2018-10-15 NOTE — PROGRESS NOTES
CM met with patient to discuss consult concerning her d/c plan. Patient alert and oriented x4. Patient states she lives with spouse in a two level home with three steps to enter into the home. States she's been independent with ADL's and do have DME's in the home. Patient do have home O2 from [address] [place] [address] at #liters. Patient states she plan to return home not interested in rehab nor [facility]). States that it's only her and spouse no children but do have a small [place] of friends that they can rely on. CM will continue to follow if any changes needed. Care Management Interventions PCP Verified by CM: Yes (Dr. Raj Mcintosh,) Mode of Transport at Discharge: Other (see comment) (Spouse Deepa Maxwell)) Transition of Care Consult (CM Consult): Discharge Planning Discharge Durable Medical Equipment: No 
Physical Therapy Consult: No 
Occupational Therapy Consult: No 
Speech Therapy Consult: No 
Current Support Network: Own Home, Lives with Spouse Confirm Follow Up Transport: Family Plan discussed with Pt/Family/Caregiver: Yes Freedom of Choice Offered: Yes [employer] Information Provided?: No 
Discharge Location Discharge Placement: Home

## 2018-10-15 NOTE — CONSULTS
PULMONARY/CCM CONSULT :  10/15/2018 Date of Admission:  10/12/2018 The patient's chart has been reviewed and the chart has been discussed with nursing staff. Subjective: This patient has been seen and evaluated at the request of Dr. Kenyatta Lucas. Patient is a 77 y.o.  female presents with AECOPD. She is known to us and followed for GOLD STAGE IV COPD (FEV 1 30% predicted) and continued smoking. She has Hx breast cancer and is followed at [facility] by Dr Khalif Hendrix. She was admitted but the hospitalist and was treated with bronchodilators and steroids. Last CXR without acute abnormality and last chest CT was in 2013. She initially required BiPAP in the ER but improved and was transferred to the floor. She does still smoke. We were asked to evaluate for AECOPD with continued smoking. At home, she is on 2 lpm. She has progressively declined and is now experiencing more SOB with ambulation. She is compliant with duoneb and pulmicort. She has no interest in being full code and desires to continue smoking her few cigarettes a day. Past Medical History:  
Diagnosis Date  Acute respiratory failure ([facility] 10/2016  
 required intubation at [facility]  Anemia ANEMIA /GI BLEED 1- PRBC 2 UNITS  Arrhythmia HX SVT  CAD (coronary artery disease) aortic valve stenosis pending repair/replacement  Cancer ([facility] 6/2012 BREAST CANCER RIGHT  Cholelithiasis 1/2/2013  COPD exacerbation ([facility] 1/3/2013  Systolic murmur 3/1/7084  Unspecified adverse effect of anesthesia OVERSEDATION, SEVERE HYPOTENSION Past Surgical History:  
Procedure Laterality Date  CARDIAC SURG PROCEDURE UNLIST  2/8/2012  HX AORTIC VALVE REPLACEMENT  2/28/13 Dr. Noelle Camp  HX BREAST LUMPECTOMY  6/2012 8/2012 RIGHT  HX CAROTID ENDARTERECTOMY  12/8/2008 RIGHT  HX CAROTID ENDARTERECTOMY Left 10/2016 Dr. Jose Manuel Decker at [facility]; 2008  HX LAP CHOLECYSTECTOMY  2010  HX VASCULAR ACCESS  2013  
 pic line placed and removed Social History Substance Use Topics  Smoking status: Current Every Day Smoker Packs/day: 0.25 Years: 50.00 Types: Cigarettes  Smokeless tobacco: Never Used Comment: Pt states she smokes about 8 a day.  Alcohol use No  
  
Family History Problem Relation Age of Onset  Stroke Mother  Lung Disease Father   
  Sebastien Grila  Hypertension Other Allergies Allergen Reactions  Demerol [Meperidine] Other (comments) Low blood pressure  Gabapentin Other (comments) Heart Racing  Lortab [Hydrocodone-Acetaminophen] Swelling HYPOTENSION 
  
 Percocet [Oxycodone-Acetaminophen] Other (comments)  
  hallucinations  Statins-Hmg-Coa Reductase Inhibitors Unknown (comments) Prior to Admission Medications Prescriptions Last Dose Informant Patient Reported? Taking? OXYGEN-AIR DELIVERY SYSTEMS 10/12/2018 at Unknown time  Yes Yes Sig: by Does Not Apply route. 2.5 L cont. albuterol (PROVENTIL HFA, VENTOLIN HFA, PROAIR HFA) 90 mcg/actuation inhaler Not Taking at Unknown time  No No  
Sig: Take 2 Puffs by inhalation every four (4) hours as needed for Wheezing. albuterol-ipratropium (DUO-NEB) 2.5 mg-0.5 mg/3 ml nebu   No Yes Sig: 3 mL by Nebulization route four (4) times daily. aspirin delayed-release 81 mg tablet 10/11/2018 at Unknown time  No Yes Sig: Take 1 Tab by mouth daily. budesonide (PULMICORT) 0.5 mg/2 mL nbsp Unknown at Unknown time  No No  
Si mL by Nebulization route two (2) times a day.  
gabapentin (NEURONTIN) 100 mg capsule 10/5/2018 at Unknown time  No Yes Sig: Take 1 Cap by mouth nightly. metoprolol succinate (TOPROL-XL) 50 mg XL tablet 10/11/2018 at Unknown time  No Yes Sig: Take 1 Tab by mouth daily. sertraline (ZOLOFT) 100 mg tablet 10/11/2018 at Unknown time  No Yes Sig: Take 1 Tab by mouth daily. traMADol (ULTRAM) 50 mg tablet Not Taking at Unknown time  No No  
Sig: TAKE ONE TABLET BY MOUTH EVERY 6 HOURS AS NEEDED FOR PAIN (MAX  DAILY  AMOUNT  200MG) Facility-Administered Medications: None MEDS SCHEDULED:   
Current Facility-Administered Medications Medication Dose Route Frequency  methylPREDNISolone (PF) (SOLU-MEDROL) injection 40 mg  40 mg IntraVENous Q6H  
 insulin lispro (HUMALOG) injection   SubCUTAneous AC&HS  enoxaparin (LOVENOX) injection 30 mg  30 mg SubCUTAneous Q24H  
 albuterol-ipratropium (DUO-NEB) 2.5 MG-0.5 MG/3 ML  3 mL Nebulization Q4H RT  
 guaiFENesin ER (MUCINEX) tablet 1,200 mg  1,200 mg Oral Q12H  
 ondansetron (ZOFRAN) injection 4 mg  4 mg IntraVENous Q8H PRN  
 acetaminophen (TYLENOL) tablet 650 mg  650 mg Oral Q6H PRN  
 levoFLOXacin (LEVAQUIN) 750 mg in D5W IVPB  750 mg IntraVENous Q48H  
 sodium chloride (NS) flush 10 mL  10 mL InterCATHeter Q8H  
 sodium chloride (NS) flush 10 mL  10 mL InterCATHeter PRN  
 albuterol-ipratropium (DUO-NEB) 2.5 MG-0.5 MG/3 ML  3 mL Nebulization Q4H PRN  
 aspirin delayed-release tablet 81 mg  81 mg Oral DAILY  budesonide (PULMICORT) 500 mcg/2 ml nebulizer suspension  500 mcg Nebulization BID RT  
 metoprolol succinate (TOPROL-XL) XL tablet 50 mg  50 mg Oral DAILY  sertraline (ZOLOFT) tablet 100 mg  100 mg Oral DAILY  sodium chloride (NS) flush 5-10 mL  5-10 mL IntraVENous Q8H  
 sodium chloride (NS) flush 5-10 mL  5-10 mL IntraVENous PRN  
 influenza vaccine 2018-19 (6 mos+)(PF) (FLUARIX QUAD/FLULAVAL QUAD) injection 0.5 mL  0.5 mL IntraMUSCular PRIOR TO DISCHARGE  nicotine (NICODERM CQ) 14 mg/24 hr patch 1 Patch  1 Patch TransDERmal Q24H Review of Systems Constitutional: positive for fatigue and malaise Respiratory: positive for cough, wheezing or dyspnea on exertion Cardiovascular: negative for chest pain, chest pressure/discomfort, irregular heart beats, near-syncope, paroxysmal nocturnal dyspnea, exertional chest pressure/discomfort Gastrointestinal: negative for reflux symptoms, nausea, vomiting, change in bowel habits, melena, diarrhea, constipation, abdominal pain and jaundice Musculoskeletal:positive for limited mobility Objective:  
 
Vitals:  
 10/15/18 0320 10/15/18 8584 10/15/18 0708 10/15/18 0740 BP: 163/62  147/60 Pulse: (!) 101  64 Resp: 18  19 Temp: 98 °F (36.7 °C)  98 °F (36.7 °C) SpO2: 99% 99% 97% 98% Weight:      
Height:      
 
  
10/13 1901 - 10/15 0700 In: 290 [P.O.:290] Out: - PHYSICAL EXAM  
 
Physical Exam:  
General:  Alert, cooperative,thin. Eyes:  Conjunctivae/corneas clear. Nose: Nares patent and moist.   
Mouth/Throat: Lips, mucosa, and tongue pink and intact. Neck: Supple, symmetrical.  
Respiratory:   Scattered wheezes to auscultation bilaterally on on 2 lpm  
Cardiovascular:  Regular rate and rhythm, S1, S2, no murmur, click, rub or gallop. GI:   Abdomen soft, non-tender. Bowel sounds active X 4 Q. Musculoskeletal: Extremities symmetrical, atraumatic, no cyanosis, no edema. Pulses: 2+ and symmetric all extremities. Skin: Skin color, texture, turgor normal. No rashes or lesions Neurologic: 2+ strength bilateral upper and lower extremities, sensation throughout appropriate. Alert and oriented. Activity: limited Nutrition:as desired CHEST X-RAYS: 
 
 
CULTURES: none LABS Recent Labs 10/15/18 
 0019  10/14/18 
 1159  10/13/18 
 5217 WBC  7.4  6.7  4.4 HGB  8.9*  8.6*  9.7* HCT  30.2*  29.2*  32.8* PLT  238  246  264 Recent Labs 10/15/18 
 3022  10/14/18 
 1204  10/13/18 
 0551  10/12/18 
 1542 NA  141  139  140  139  
K  4.3  4.3  3.7  3.5 CL  102  100  99  99 GLU  119*  127*  149*  99  
CO2  37*  36*  34*  38* BUN  21  19  22  15 CREA  0.75  0.78  0.91  0.79  
TROIQ   --    --    --   0.03  
 
 No results for input(s): PH, PCO2, PO2, HCO3 in the last 72 hours. Assessment:  
 
Hospital Problems  Date Reviewed: 10/15/2018 Codes Class Noted POA * (Principal)COPD[facility] exacerbations [facility] ICD-10-CM: J44.1 ICD-9-CM: 491.21  10/12/2018 Yes CVD (cerebrovascular disease), unspecified ICD-10-CM: I67.9 ICD-9-CM: 437.9  9/15/2016 Yes Coronary atherosclerosis (Chronic) ICD-10-CM: I25.10 ICD-9-CM: 414.00  8/19/2013 Yes Diastolic heart failure (HCC) (Chronic) ICD-10-CM: I50.30 ICD-9-CM: 428.30  3/9/2013 Yes S/P AVR (aortic valve replacement)-2/28/13 (Chronic) ICD-10-CM: Z95.2 ICD-9-CM: V43.3  2/28/2013 Yes RBBB ICD-10-CM: I45.10 ICD-9-CM: 426.4  1/3/2013 Yes Cigarette nicotine dependence without complication (Chronic) ZBB-54-VU: T20.615 ICD-9-CM: 305.1  1/3/2013 Yes Essential hypertension, benign (Chronic) ICD-10-CM: I10 
ICD-9-CM: 401.1  1/2/2013 Yes Carotid stenosis ICD-10-CM: I65.29 ICD-9-CM: 433.10  1/2/2013 Yes Overview Signed 2/28/2013  8:35 AM by Archana Samano NP  
  S/p R CEA 2008 (Dr. Catherne Gaucher) Plan:  
 
Continue Duoneb and pulmicort Voices that she desires to be DNR, changed in connect care Discussed symptom burden and interested in seeing PC (consulted), follow up O/P as well Smokes 3 cigarettes daily and aware of risks Ativan added PRN REMBERTO Chew More than 50% of time documented was spent in face-to-face contact with the patient and in the care of the patient on the floor/unit where the patient is located. Lungs: b/l wheezing. Heart S1 and S2 audible, no murmers or rubs appreciated Other Ativan for anxiety. Wants more quality of life. PC to see patient Does not want to quit smoking I have spoken with and examined the patient. I have reviewed the history, examination, assessment, and plan and agree with the above.  
 
Barbara Enriquez MD 
 
 
 This note was signed electronically. Errors are unfortunately her likely due to dictation software.

## 2018-10-15 NOTE — PROGRESS NOTES
Progress Note Patient: Gt Taylor MRN: [id number]  SSN: [id number] YOB: 1940  Age: 77 y.o. Sex: female Admit Date: 10/12/2018 LOS: 3 days Subjective:  
Mrs. Normajean Fleischer is a 78 y/o female with a h/o severe COPD on 4 liters NC at home, chronic anemia, dHF, CAD, chronic active smoker, who was admitted for COPD exacerbation. She has had multiple attacks within 3 months, requiring outpatient management with po steroids and antibiotics. She had been given Anoro a few weeks back which initially worked well but it was too expensive to continue. She cannot pay pumps, only nebulizer solutions. The patient was started on iv solumedrol, levaquin, duonebs,pulmicrot,  flutter valve. Her clinical condition has remained stable, but she still feels symptomatic. Pulmonary team involved with plan to continue current management. Patient chose to be DNR/DNI and pain and palliative consulted. 10/15/18: On my assessment, she was found in mild distress due to dyspnea. No wheezing audible today. Her  was present in the room and all questions were answered. Objective:  
 
Vitals:  
 10/14/18 2425 10/15/18 0320 10/15/18 0359 10/15/18 1766 BP:  163/62  147/60 Pulse:  (!) 101  64 Resp:  18  19 Temp:  98 °F (36.7 °C)  98 °F (36.7 °C) SpO2: 99% 99% 99% 97% Weight:      
Height:      
  
 
Intake and Output: 
Current Shift:   
Last three shifts: 10/13 1901 - 10/15 0700 In: 290 [P.O.:290] Out: - Physical Exam:  
GENERAL: alert, cooperative, no distress, appears stated age EYE: negative LYMPHATIC: Cervical, supraclavicular, and axillary nodes normal.  
THROAT & NECK: normal and no erythema or exudates noted. LUNG: bilateral diminished air entry, no wheezing, minimal rhonchi, Saturating 98% on 2 liters NC  
HEART: tachycardia, regular rate and rhythm, S1, S2 normal, no murmur, click, rub or gallop ABDOMEN: soft, non-tender.  Bowel sounds normal. No masses,  no organomegaly EXTREMITIES:  extremities normal, atraumatic, no cyanosis or edema SKIN: Normal. 
NEUROLOGIC: negative PSYCHIATRIC: non focal 
 
Lab/Data Review: All lab results for the last 24 hours reviewed. Assessment:  
 
Principal Problem: 
  COPD, frequent exacerbations ([facility] (10/12/2018) Active Problems: 
  Essential hypertension, benign (1/2/2013) Carotid stenosis (1/2/2013) Overview: S/p R CEA 2008 (Dr. Tiago Kuo) RBBB (1/3/2013) Cigarette nicotine dependence without complication (8/0/7191) S/P AVR (aortic valve replacement)-2/28/13 (2/28/2013) Diastolic heart failure ([facility] (3/9/2013) Coronary atherosclerosis (8/19/2013) CVD (cerebrovascular disease), unspecified (9/15/2016) Plan:  
-Acute COPD exacerbation: patient uses 4 liters home oxygen, currently around 2-3 - continue duonebs, pulmicort, mucinex, solumedrol, levaquin  - flutter valve - Pulmonary follow up - pain and palliative consulted - Chronic Anemia: stable - monitor  
                     
-CAD: asymptomatic, monitor  
  
-dCHF: not in exacerbation - continue home meds  
                    
  
-MDD/anxiety: home meds DVT ppx: Lovenox Code status: DNR/DNI. REVIEWED WITH PATIENT Disposition: home one medically stable. Possible within 1-2 days Signed By: Willy Zapata MD   
 October 15, 2018

## 2018-10-15 NOTE — PROGRESS NOTES
SBAR shift report received from Uriel RN. Pt stable. Assessment complete. Pt is lying in bed, family member present at bedside. Resp even, unlabored. Pt is alert, orient X 4. Pt appears in no acute distress at this time. Pt is on 4L nasal cannula 02. Pt denies pain or SOB at this time. Pt c/o productive cough. Pt encouraged to call for assistance, call light in reach. Safety measures in place. Will continue to monitor

## 2018-10-15 NOTE — PROGRESS NOTES
During today's shift, code status has changed to DNR. Pt sitting up on side of bed eating dinner and talking with . Patient remains alert and oriented. Minimal wheezing present but patient reports decreased anxiety and work of breathing. No complaints at this time, pain 0/10. Bed in low, locked position with call light in reach.

## 2018-10-15 NOTE — PROGRESS NOTES
Pt reports \"feeling better after that relaxing treatment last night\" (Ativan). Pt repositioned to decrease work of breathing. Patient alert and oriented, no complaints at this time. Pt on 3L NC, no apparent respiratory distress. Slight wheeze upon auscultation. Midline in place and flushed. No complaints at this time. Bed in low, locked position; call light in reach.

## 2018-10-16 NOTE — PROGRESS NOTES
Palliative Care Progress Note Patient: Maureen Lee MRN: [id number]  SSN: [id number] YOB: 1940  Age: 77 y.o. Sex: female Assessment/Plan: Chief Complaint/Interval History: SOB Principal Diagnosis: · Respiratory Failure, Acute on Chronic  J96.20 Additional Diagnoses: · Anxiety  F41.1 · Debility, Unspecified  R53.81 
· Frailty  R54 · Respiratory Failure, Acute on Chronic  J96.20 · Encounter for Palliative Care  Z51.5 Palliative Performance Scale (PPS) 60% Medical Decision Making:  
Reviewed and summarized - Patient reports some improvement in her breathing efforts. Discussed case with appropriate providers - Belle Taylor, NP with [facility]; Triage/Scheduling team at [facility]; Charleen Claire, Case Management. Reviewed laboratory and x-ray data - CO2 at 35. Hbg at 8.2 today, down from 8.9 yesterday, down from 9.7 on 10/12, and down from 13.3 on 6/7/2018. Talked with the patient this AM, and her . She did a little better yesterday after the Oxycodone. No swelling, itching, or rashes. No issues with constipation today. We talked through how DC would be handled, and advised that a DULCE appointment would take place a week from today with NP (Debi) with [facility]. Advised that she would need to have [address] as part of the team over the next few weeks so we can have closer observation & monitoring. Initially, the patient wasn't interested, but after further discussion, she agreed. The  was in agreement also. Given the drop in the Hbg, requested that an H&H be drawn on Thursday by [facility] [place] team, and results called to [facility] NP. Notified [facility] office to schedule patient for midday appointment a week from today. More than 50% of this 25 minute visit was spent counseling and coordination of care as outlined above. Subjective:  
 
Review of Systems: A comprehensive review of systems was negative except for: Respiratory: Positive for GARCIA. Objective:  
 
Visit Vitals  /65  Pulse 82  Temp 97.6 °F (36.4 °C)  Resp 22  
 Ht 5' 3\" (1.6 m)  Wt 110 lb (49.9 kg)  SpO2 97%  BMI 19.49 kg/m2 Physical Exam: 
 
General:  Cooperative. No acute distress. GARCIA, some dyspnea while at rest also. Eyes:  Conjunctivae/corneas clear Nose: Nares normal. Septum midline. Neck: Supple, symmetrical, trachea midline, no JVD Lungs:   Scattered wheezing throughout. Moist, wet cough. Heart:  Regular rate and rhythm, no murmur Abdomen:   Soft, non-tender, non-distended Extremities: Normal, atraumatic, no cyanosis or edema Skin: Skin color, texture, turgor poor. Frail. Neurologic: Nonfocal  
Psych: Alert and oriented Signed By: Geraldine Leventhal, NP October 16, 2018

## 2018-10-16 NOTE — PROGRESS NOTES
Inpatient  Referral for Complex Case Management In hospital visit with patient - who will DC home today. Admitted 10/12 with COPD, history of frequent exacerbations. Patient has declined [place] and reports she is waiting for her  to pick her up. Explained role of case management - made patient aware that this will begin with a phone call in the next 1-2 days. Patient is agreeable. Plan - DC home today Link patient with [facility] with goal of improved COPD self-management, avoid further hospitalizations, promote medical provider follow up.

## 2018-10-16 NOTE — PROGRESS NOTES
[facility] Admission Date: 10/12/2018 Daily Progress Note: 10/16/2018 Patient is a 77 y.o.  female presents with AECOPD. 
  
She is known to us and followed for GOLD STAGE IV COPD (FEV 1 30% predicted) and continued smoking. She has Hx breast cancer and is followed at [facility] by Dr Chanelle Saunders. She was admitted but the hospitalist and was treated with bronchodilators and steroids. Last CXR without acute abnormality and last chest CT was in 2013. She initially required BiPAP in the ER but improved and was transferred to the floor. She does still smoke. We were asked to evaluate for AECOPD with continued smoking.  
  
At home, she is on 2 lpm. She has progressively declined and is now experiencing more SOB with ambulation. She is compliant with duoneb and pulmicort. She has no interest in being full code and desires to continue smoking her few cigarettes a day. Subjective:  
 
Sats 100% on 3 lpm. Ativan PRN and scheduled oxycodone IR added and tells me it helped. Sleeping.  at bedside. Asking to go home. Review of Systems Constitutional: negative for fevers, chills and sweats Respiratory: positive for dyspnea on exertion Current Facility-Administered Medications Medication Dose Route Frequency  levoFLOXacin (LEVAQUIN) tablet 750 mg  750 mg Oral Q24H  
 docusate sodium (COLACE) capsule 100 mg  100 mg Oral BID  LORazepam (ATIVAN) tablet 0.5 mg  0.5 mg Oral Q12H PRN  
 oxyCODONE IR (ROXICODONE) tablet 5 mg  5 mg Oral DAILY AFTER BREAKFAST  methylPREDNISolone (PF) (SOLU-MEDROL) injection 40 mg  40 mg IntraVENous Q6H  
 insulin lispro (HUMALOG) injection   SubCUTAneous AC&HS  enoxaparin (LOVENOX) injection 30 mg  30 mg SubCUTAneous Q24H  
 albuterol-ipratropium (DUO-NEB) 2.5 MG-0.5 MG/3 ML  3 mL Nebulization Q4H RT  
 guaiFENesin ER (MUCINEX) tablet 1,200 mg  1,200 mg Oral Q12H  
 ondansetron (ZOFRAN) injection 4 mg  4 mg IntraVENous Q8H PRN  
  acetaminophen (TYLENOL) tablet 650 mg  650 mg Oral Q6H PRN  
 sodium chloride (NS) flush 10 mL  10 mL InterCATHeter Q8H  
 sodium chloride (NS) flush 10 mL  10 mL InterCATHeter PRN  
 albuterol-ipratropium (DUO-NEB) 2.5 MG-0.5 MG/3 ML  3 mL Nebulization Q4H PRN  
 aspirin delayed-release tablet 81 mg  81 mg Oral DAILY  budesonide (PULMICORT) 500 mcg/2 ml nebulizer suspension  500 mcg Nebulization BID RT  
 metoprolol succinate (TOPROL-XL) XL tablet 50 mg  50 mg Oral DAILY  sertraline (ZOLOFT) tablet 100 mg  100 mg Oral DAILY  sodium chloride (NS) flush 5-10 mL  5-10 mL IntraVENous Q8H  
 sodium chloride (NS) flush 5-10 mL  5-10 mL IntraVENous PRN  
 influenza vaccine 2018-19 (6 mos+)(PF) (FLUARIX QUAD/FLULAVAL QUAD) injection 0.5 mL  0.5 mL IntraMUSCular PRIOR TO DISCHARGE  nicotine (NICODERM CQ) 14 mg/24 hr patch 1 Patch  1 Patch TransDERmal Q24H Objective:  
 
Vitals:  
 10/16/18 9519 10/16/18 0410 10/16/18 3806 10/16/18 2945 BP: 175/72 137/73  179/68 Pulse: 96   93 Resp: 18   17 Temp: 98.1 °F (36.7 °C)   97.9 °F (36.6 °C) SpO2: 96%  97% 100% Weight:      
Height:      
 
Intake and Output:  
10/14 1901 - 10/16 0700 In: 4656 [P.O.:1260; I.V.:150] Out: - Physical Exam:         
Constitutional: the patient is well develop HEENT: Sclera clear, pupils equal, oral mucosa moist 
Lungs: scattered bilateral wheezes on 2 lpm 
Cardiovascular: RRR without M,G,R 
Abd/GI: soft and non-tender; with positive bowel sounds. Ext: warm without cyanosis. There is no lower leg edema. Musculoskeletal: moves all four extremities with equal strength Skin: no jaundice or rashes, no wounds Neuro: no gross neuro deficits Lines/Drains: IV 
Nutrition: cardiac CHEST XRAY:  
 
 
LAB Recent Labs 10/16/18 
 0606  10/15/18 
 5184  10/14/18 
 3694 WBC  7.1  7.4  6.7 HGB  8.2*  8.9*  8.6* HCT  28.1*  30.2*  29.2*  
PLT  222  238  246 Recent Labs 10/16/18 6131  10/15/18 
 2720  10/14/18 
 1680 NA  143  141  139  
K  4.4  4.3  4.3 CL  102  102  100 CO2  35*  37*  36* GLU  142*  119*  127* BUN  22  21  19 CREA  0.72  0.75  0.78 Assessment:  
 
Patient Active Problem List  
Diagnosis Code  Essential hypertension, benign I10  Cholelithiasis K80.20  Carotid stenosis I65.29  
 Breast cancer (HCC) C50.919  
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  
 Abnormal chest CT R93.89  
 Mixed dyslipidemia E78.2  
 COPD, very severe ([address] J44.9  Hypoxemia R09.02  
 S/P AVR (aortic valve replacement)-2/28/13 Z95.2  Diastolic heart failure (HCC) I50.30  Coronary atherosclerosis I25.10  Adenopathy R59.1  Heart valve replaced by other means Z95.4  CVD (cerebrovascular disease), unspecified I67.9  
 COPD, frequent exacerbations ([facility] J44.1  DNR (do not resuscitate) [phone] [facility] Hospital Problems  Date Reviewed: 10/15/2018 Codes Class Noted POA  
 DNR (do not resuscitate) ICD-10-CM: R50 ICD-9-CM: V49.86  10/15/2018 Yes Overview Signed 10/15/2018  9:24 AM by Gordon Pearce NP After discussion on 10/15/2018. * (Principal)COPD, frequent exacerbations ([facility] ICD-10-CM: J44.1 ICD-9-CM: 491.21  10/12/2018 Yes On nebs, steroids, narcotics added No desire to quit smoking CVD (cerebrovascular disease), unspecified (Chronic) ICD-10-CM: I67.9 ICD-9-CM: 437.9  9/15/2016 Yes Coronary atherosclerosis (Chronic) ICD-10-CM: I25.10 ICD-9-CM: 414.00  8/19/2013 Yes Diastolic heart failure (HCC) (Chronic) ICD-10-CM: I50.30 ICD-9-CM: 428.30  3/9/2013 Yes S/P AVR (aortic valve replacement)-2/28/13 (Chronic) ICD-10-CM: Z95.2 ICD-9-CM: V43.3  2/28/2013 Yes RBBB ICD-10-CM: I45.10 ICD-9-CM: 426.4  1/3/2013 Yes  Cigarette nicotine dependence without complication (Chronic) LILLY-43-NL: Y33.088 
 ICD-9-CM: 305.1  1/3/2013 Yes Essential hypertension, benign (Chronic) ICD-10-CM: I10 
ICD-9-CM: 401.1  1/2/2013 Yes Carotid stenosis ICD-10-CM: I65.29 ICD-9-CM: 433.10  1/2/2013 Yes Overview Signed 2/28/2013  8:35 AM by Janes Linares NP  
  S/p R CEA 2008 (Dr. Rosita Coker) -- on oxycodone IR daily and ativan PRN. plans to [facility] up with [facility] after discharge -- continues to wheeze on solumedrol 40 gm Q 6 hours, taper here and evaluate response before discharge. Changed to Q 8 hours here. --mobilize as able -- continue nebs REMBERTO [place] More than 50% of time documented was spent in face-to-face contact with the patient and in the care of the patient on the floor/unit where the patient is located. Lungs: mild wheezing. Heart S1 and S2 audible, no murmers or rubs appreciated Other Taper steroids. Doing well today I have spoken with and examined the patient. I have reviewed the history, examination, assessment, and plan and agree with the above. Inez Rabago MD 
 
 
This note was signed electronically. Errors are unfortunately her likely due to dictation software.

## 2018-10-16 NOTE — PROGRESS NOTES
Spoke with Naomi Vital NP regarding discharge. She spoke with the patient who is now agreeing to be followed by [address] for RN. She will require a blood draw on 10/18 for Hgb and Hct. She will be followed in the Outpatient Palliative Care Clinic by Naomi Vital NP. No other discharge needs identified. Case Management will remain available to assist as needed. Care Management Interventions PCP Verified by CM: Yes (Dr. Brett Tucker,) Mode of Transport at Discharge: Other (see comment) (Spouse Donald Child)) Transition of Care Consult (CM Consult): Discharge Planning, Home Health [facility] - INPATIENT: Yes Discharge Durable Medical Equipment: No 
Physical Therapy Consult: No 
Occupational Therapy Consult: No 
Speech Therapy Consult: No 
Current Support Network: Own Home, Lives with Spouse Confirm Follow Up Transport: Family Plan discussed with Pt/Family/Caregiver: Yes Freedom of Choice Offered: Yes [employer] Information Provided?: No 
Discharge Location Discharge Placement: Home with home health

## 2018-10-16 NOTE — DISCHARGE INSTRUCTIONS
Stop smoking. Stop use of tobacco products. If worsened, call your doctor or return to hospital.   When follow up with your doctor, make sure that your doctor is aware of this admission and review hospital record and follow up on lab pr other investigation results for continuity of care. Also, review your medications with your doctor for possible need of adjustment or refills. Chronic Obstructive Pulmonary Disease (COPD): Care Instructions  Your Care Instructions    Chronic obstructive pulmonary disease (COPD) is a general term for a group of lung diseases, including emphysema and chronic bronchitis. People with COPD have decreased airflow in and out of the lungs, which makes it hard to breathe. The airways also can get clogged with thick mucus. Cigarette smoking is a major cause of COPD. Although there is no cure for COPD, you can slow its progress. Following your treatment plan and taking care of yourself can help you feel better and live longer. Follow-up care is a key part of your treatment and safety. Be sure to make and go to all appointments, and call your doctor if you are having problems. It's also a good idea to know your test results and keep a list of the medicines you take. How can you care for yourself at home?   Staying healthy    · Do not smoke. This is the most important step you can take to prevent more damage to your lungs. If you need help quitting, talk to your doctor about stop-smoking programs and medicines. These can increase your chances of quitting for good.     · Avoid colds and flu. Get a pneumococcal vaccine shot. If you have had one before, ask your doctor whether you need a second dose. Get the flu vaccine every fall. If you must be around people with colds or the flu, wash your hands often.     · Avoid secondhand smoke, air pollution, and high altitudes. Also avoid cold, dry air and hot, humid air.  Stay at home with your windows closed when air pollution is bad.    Medicines and oxygen therapy    · Take your medicines exactly as prescribed. Call your doctor if you think you are having a problem with your medicine.     · You may be taking medicines such as:  ¨ Bronchodilators. These help open your airways and make breathing easier. Bronchodilators are either short-acting (work for 6 to 9 hours) or long-acting (work for 24 hours). You inhale most bronchodilators, so they start to act quickly. Always carry your quick-relief inhaler with you in case you need it while you are away from home. ¨ Corticosteroids (prednisone, budesonide). These reduce airway inflammation. They come in pill or inhaled form. You must take these medicines every day for them to work well.     · A spacer may help you get more inhaled medicine to your lungs. Ask your doctor or pharmacist if a spacer is right for you. If it is, ask how to use it properly.     · Do not take any vitamins, over-the-counter medicine, or herbal products without talking to your doctor first.     · If your doctor prescribed antibiotics, take them as directed. Do not stop taking them just because you feel better. You need to take the full course of antibiotics.     · Oxygen therapy boosts the amount of oxygen in your blood and helps you breathe easier. Use the flow rate your doctor has recommended, and do not change it without talking to your doctor first.   Activity    · Get regular exercise. Walking is an easy way to get exercise. Start out slowly, and walk a little more each day.     · Pay attention to your breathing. You are exercising too hard if you cannot talk while you are exercising.     · Take short rest breaks when doing household chores and other activities.     · Learn breathing methods--such as breathing through pursed lips--to help you become less short of breath.     · If your doctor has not set you up with a pulmonary rehabilitation program, talk to him or her about whether rehab is right for you.  Rehab includes exercise programs, education about your disease and how to manage it, help with diet and other changes, and emotional support. Diet    · Eat regular, healthy meals. Use bronchodilators about 1 hour before you eat to make it easier to eat. Eat several small meals instead of three large ones. Drink beverages at the end of the meal. Avoid foods that are hard to chew.     · Eat foods that contain protein so that you do not lose muscle mass.     · Talk with your doctor if you gain too much weight or if you lose weight without trying.    Mental health    · Talk to your family, friends, or a therapist about your feelings. It is normal to feel frightened, angry, hopeless, helpless, and even guilty. Talking openly about bad feelings can help you cope. If these feelings last, talk to your doctor. When should you call for help? Call 911 anytime you think you may need emergency care. For example, call if:    · You have severe trouble breathing.    Call your doctor now or seek immediate medical care if:    · You have new or worse trouble breathing.     · You cough up blood.     · You have a fever.    Watch closely for changes in your health, and be sure to contact your doctor if:    · You cough more deeply or more often, especially if you notice more mucus or a change in the color of your mucus.     · You have new or worse swelling in your legs or belly.     · You are not getting better as expected. Where can you learn more? Go to http://grabiel-jeni.info/. Bessy Cedillo in the search box to learn more about \"Chronic Obstructive Pulmonary Disease (COPD): Care Instructions. \"  Current as of: December 6, 2017  Content Version: 11.8  © 9730-7393 [employer]. Care instructions adapted under license by [employer] (which disclaims liability or warranty for this information).  If you have questions about a medical condition or this instruction, always ask your healthcare professional. [employer] disclaims any warranty or liability for your use of this information. DISCHARGE SUMMARY from Nurse    PATIENT INSTRUCTIONS:    After general anesthesia or intravenous sedation, for 24 hours or while taking prescription Narcotics:  · Limit your activities  · Do not drive and operate hazardous machinery  · Do not make important personal or business decisions  · Do  not drink alcoholic beverages  · If you have not urinated within 8 hours after discharge, please contact your surgeon on call. Report the following to your surgeon:  · Excessive pain, swelling, redness or odor of or around the surgical area  · Temperature over 100.5  · Nausea and vomiting lasting longer than 4 hours or if unable to take medications  · Any signs of decreased circulation or nerve impairment to extremity: change in color, persistent  numbness, tingling, coldness or increase pain  · Any questions    What to do at Home:  *  Please give a list of your current medications to your Primary Care Provider. *  Please update this list whenever your medications are discontinued, doses are      changed, or new medications (including over-the-counter products) are added. *  Please carry medication information at all times in case of emergency situations. These are general instructions for a healthy lifestyle:    No smoking/ No tobacco products/ Avoid exposure to second hand smoke  Surgeon General's Warning:  Quitting smoking now greatly reduces serious risk to your health.     Obesity, smoking, and sedentary lifestyle greatly increases your risk for illness    A healthy diet, regular physical exercise & weight monitoring are important for maintaining a healthy lifestyle    You may be retaining fluid if you have a history of heart failure or if you experience any of the following symptoms:  Weight gain of 3 pounds or more overnight or 5 pounds in a week, increased swelling in our hands or feet or shortness of breath while lying flat in bed. Please call your doctor as soon as you notice any of these symptoms; do not wait until your next office visit. Recognize signs and symptoms of STROKE:    F-face looks uneven    A-arms unable to move or move unevenly    S-speech slurred or non-existent    T-time-call 911 as soon as signs and symptoms begin-DO NOT go       Back to bed or wait to see if you get better-TIME IS BRAIN. Warning Signs of HEART ATTACK     Call 911 if you have these symptoms:   Chest discomfort. Most heart attacks involve discomfort in the center of the chest that lasts more than a few minutes, or that goes away and comes back. It can feel like uncomfortable pressure, squeezing, fullness, or pain.  Discomfort in other areas of the upper body. Symptoms can include pain or discomfort in one or both arms, the back, neck, jaw, or stomach.  Shortness of breath with or without chest discomfort.  Other signs may include breaking out in a cold sweat, nausea, or lightheadedness. Don't wait more than five minutes to call 911 - MINUTES MATTER! Fast action can save your life. Calling 911 is almost always the fastest way to get lifesaving treatment. Emergency Medical Services staff can begin treatment when they arrive -- up to an hour sooner than if someone gets to the hospital by car. The discharge information has been reviewed with the patient. The patient verbalized understanding. Discharge medications reviewed with the patient and appropriate educational materials and side effects teaching were provided.   ___________________________________________________________________________________________________________________________________

## 2018-10-16 NOTE — DISCHARGE SUMMARY
Discharge Summary Patient: Saul Banegas MRN: [id number]  SSN: [id number] YOB: 1940  Age: 77 y.o. Sex: female Admit Date: 10/12/2018 Discharge Date: 10/16/2018 Admission Diagnoses: COPD, frequent exacerbations ([facility] Discharge Diagnoses:  
Problem List as of 10/16/2018  Date Reviewed: 10/15/2018 Codes Class Noted - Resolved DNR (do not resuscitate) ICD-10-CM: R27 ICD-9-CM: V49.86  10/15/2018 - Present Overview Signed 10/15/2018  9:24 AM by Atul Barnett NP After discussion on 10/15/2018. * (Principal)COPD, frequent exacerbations ([facility] ICD-10-CM: J44.1 ICD-9-CM: 491.21  10/12/2018 - Present CVD (cerebrovascular disease), unspecified (Chronic) ICD-10-CM: I67.9 ICD-9-CM: 437.9  9/15/2016 - Present Coronary atherosclerosis (Chronic) ICD-10-CM: I25.10 ICD-9-CM: 414.00  8/19/2013 - Present Adenopathy ICD-10-CM: R59.1 ICD-9-CM: 785.6  8/19/2013 - Present Heart valve replaced by other means ICD-10-CM: Z95.4 ICD-9-CM: V43.3  8/19/2013 - Present Diastolic heart failure (HCC) (Chronic) ICD-10-CM: I50.30 ICD-9-CM: 428.30  3/9/2013 - Present Mixed dyslipidemia (Chronic) ICD-10-CM: Q06.0 ICD-9-CM: 272.2  2/28/2013 - Present COPD, very severe ([facility] (Chronic) ICD-10-CM: J44.9 ICD-9-CM: 400  2/28/2013 - Present Overview Addendum 11/30/2017  9:09 AM by Gato Bean NP  
  FEV1 26% Hypoxemia (Chronic) ICD-10-CM: R09.02 
ICD-9-CM: 799.02  2/28/2013 - Present S/P AVR (aortic valve replacement)-2/28/13 (Chronic) ICD-10-CM: Z95.2 ICD-9-CM: V43.3  2/28/2013 - Present Abnormal chest CT ICD-10-CM: R93.89 ICD-9-CM: 793.2  1/10/2013 - Present Overview Addendum 1/10/2013  1:49 PM by Dempsey Spurling IMPRESSION: 
1. No evidence of central or segmental pulmonary embolism.  
2. Scattered reticulonodular opacities within the lungs with a tree-in-bud 
 type pattern most commonly is due to an infectious process, usually 
bacterial. Atypical infection including mycobacterial infections cannot be 
excluded. Metastatic disease is felt to be less likely. Short interval 
followup imaging may be beneficial for reassessment. 3. Right breast mass with skin thickening. 4. Scattered mildly prominent lymph nodes which may be reactive. Metastatic disease cannot be excluded. 5. 1 cm nodule within the superior segment left lower lobe presumably 
represents part of the same process described above. Metastatic disease 
cannot be. This too could be followed up on subsequent imaging. 6. Apparent contrast extravasation within the left upper extremity and 
axilla. Continued physical evaluation is recommended with warm compresses 
and compression. Surgical consultation would be recommended if there are 
changes of compartment syndrome. Signing/Reading Doctor: Ingrid Hough DO ([id number]) MICHAEL ICD-10-CM: I45.10 ICD-9-CM: 426.4  1/3/2013 - Present Rheumatic aortic stenosis ICD-10-CM: I06.0 ICD-9-CM: 395.0  1/3/2013 - Present Overview Addendum 9/15/2016  9:33 AM by Aimee Humphreys 2/28/13 (Dr. Chika Villa) AVR 19mm [facility] Ease SP 19mm kulkarni bioprosthetic AVR Cigarette nicotine dependence without complication (Chronic) SCG-48-FJ: R16.581 ICD-9-CM: 305.1  1/3/2013 - Present Essential hypertension, benign (Chronic) ICD-10-CM: I10 
ICD-9-CM: 401.1  1/2/2013 - Present Cholelithiasis ICD-10-CM: K80.20 ICD-9-CM: 574.20  1/2/2013 - Present Carotid stenosis ICD-10-CM: I65.29 ICD-9-CM: 433.10  1/2/2013 - Present Overview Signed 2/28/2013  8:35 AM by Antonia Lo NP  
  S/p R CEA 2008 (Dr. Treasure Ang) [facility] ICD-10-CM: C50.919 ICD-9-CM: 174.9  1/2/2013 - Present  Overview Addendum 3/11/2013  7:51 AM by Hilda Howard NP  
 Right with lumpectomies x2 and radiation for 21 treatments 6/12. RESOLVED: Lung nodule ICD-10-CM: R91.1 ICD-9-CM: 793.11  8/19/2013 - 10/27/2015 RESOLVED: COPD exacerbation ([facility] ICD-10-CM: J44.1 ICD-9-CM: 491.21  3/9/2013 - 4/28/2016 RESOLVED: Postoperative anemia due to acute blood loss ICD-10-CM: D62 
ICD-9-CM: 285.1  3/1/2013 - 3/9/2013 RESOLVED: Thrombocytopenia due to blood loss ICD-10-CM: D69.59 ICD-9-CM: 287.49  3/1/2013 - 4/28/2016 RESOLVED: Encounter for weaning from ventilator [facility]) ICD-10-CM: Z99.11 ICD-9-CM: V46.13  2/28/2013 - 3/2/2013 RESOLVED: Nocturnal hypoxia - sleeps with 2 liters  (Chronic) ICD-10-CM: G47.34 
ICD-9-CM: 327.24  2/28/2013 - 9/10/2018 RESOLVED: Breast mass ICD-10-CM: N63.0 ICD-9-CM: 611.72  1/10/2013 - 3/9/2013 RESOLVED: Pulmonary nodule (Chronic) ICD-10-CM: R91.1 ICD-9-CM: 793.11  1/10/2013 - 10/27/2015 Overview Signed 2/28/2013  1:06 PM by Lola Ocampo, NP  
  1 cm Left lower lobe - found Jan 2013. Scheduled for repeat CT in April 2013 RESOLVED: Acute blood loss anemia ICD-10-CM: D62 
ICD-9-CM: 285.1  1/5/2013 - 3/9/2013 RESOLVED: Hypotension ICD-10-CM: I95.9 ICD-9-CM: 458.9  1/5/2013 - 1/7/2013 RESOLVED: Chest pain, unspecified ICD-10-CM: R07.9 ICD-9-CM: 786.50  1/3/2013 - 3/9/2013 RESOLVED: Acute respiratory failure ([facility] ICD-10-CM: J96.00 
ICD-9-CM: 518.81  1/3/2013 - 1/10/2013 RESOLVED: Menopausal state ICD-10-CM: N95.1 ICD-9-CM: 627.2  1/2/2013 - 3/9/2013 RESOLVED: Systolic murmur [address]: R01.1 ICD-9-CM: 785.2  1/2/2013 - 3/9/2013 Discharge Condition: Stable Hospital Course:  
 
Patient was admitted on 10/12/2018 due to COPD exacerbation. She normally uses oxygen at home. She is an active smoker. She was treated with IV fluid, nebulizer, IV solumedrol, levaquin with improvement of her symptoms. On 10/16/2018, she felt good and would like to go home. No fever. No shaking. No chills. No shortness of breath. No chest pain. Patient is advised to stop using tobacco.  
 
Physical Exam:  
 
General:                    The patient is a pleasant female in no acute distress. lying flat in bed. Head:                                   Normocephalic/atraumatic. Eyes:                                   No palpebral pallor or scleral icterus. ENT:                                    External auricular and nasal exam within normal limits. Mucous membranes are moist. 
Neck:                                   Supple, non-tender, no JVD. Lungs:                       Clear to auscultation bilaterally with occasional inspiratory wheezes and crackles. No respiratory distress or accessory muscle use. Heart:                                  Regular rate and rhythm, without murmurs, rubs, or gallops. Abdomen:                  Soft, non-tender, non-distended with normoactive bowel sounds. Genitourinary:           No tenderness over the bladder or bilateral CVAs. Extremities:               Without clubbing, cyanosis, or edema. Skin:                                    Normal color, texture, and turgor. No rashes, lesions, or jaundice. Pulses:                      Radial and dorsalis pedis pulses present 2+ bilaterally. Capillary refill <2s. Neurologic:                CN II-XII grossly intact and symmetrical.  
                                            Moving all four extremities well with no focal deficits. Psychiatric:                Pleasant demeanor, appropriate affect. Alert and oriented x 3 Consults: Pulmonary/Critical Care Significant Diagnostic Studies: CXR 
10/14/2018 There are no interval changes. The patient is status post OHS.  Lung fields and 
 cardiac silhouette are unremarkable for a portable chest x-ray. 
  
IMPRESSION: No changes as above. Recent Results (from the past 24 hour(s)) GLUCOSE, POC Collection Time: 10/15/18 11:31 AM  
Result Value Ref Range Glucose (POC) 237 (H) 65 - 100 mg/dL GLUCOSE, POC Collection Time: 10/15/18  3:25 PM  
Result Value Ref Range Glucose (POC) 121 (H) 65 - 100 mg/dL GLUCOSE, POC Collection Time: 10/15/18  8:02 PM  
Result Value Ref Range Glucose (POC) 198 (H) 65 - 100 mg/dL GLUCOSE, POC Collection Time: 10/16/18  5:11 AM  
Result Value Ref Range Glucose (POC) 175 (H) 65 - 100 mg/dL CBC WITH AUTOMATED DIFF Collection Time: 10/16/18  6:06 AM  
Result Value Ref Range WBC 7.1 4.3 - 11.1 K/uL  
 RBC 2.95 (L) 4.05 - 5.2 M/uL HGB 8.2 (L) 11.7 - 15.4 g/dL HCT 28.1 (L) 35.8 - 46.3 % MCV 95.3 79.6 - 97.8 FL  
 MCH 27.8 26.1 - 32.9 PG  
 MCHC 29.2 (L) 31.4 - 35.0 g/dL  
 RDW 14.4 % PLATELET 661 690 - 870 K/uL MPV 9.8 9.4 - 12.3 FL ABSOLUTE NRBC 0.00 0.0 - 0.2 K/uL  
 DF AUTOMATED NEUTROPHILS 84 (H) 43 - 78 % LYMPHOCYTES 10 (L) 13 - 44 % MONOCYTES 6 4.0 - 12.0 % EOSINOPHILS 0 (L) 0.5 - 7.8 % BASOPHILS 0 0.0 - 2.0 % IMMATURE GRANULOCYTES 1 0.0 - 5.0 %  
 ABS. NEUTROPHILS 6.0 1.7 - 8.2 K/UL  
 ABS. LYMPHOCYTES 0.7 0.5 - 4.6 K/UL  
 ABS. MONOCYTES 0.4 0.1 - 1.3 K/UL  
 ABS. EOSINOPHILS 0.0 0.0 - 0.8 K/UL  
 ABS. BASOPHILS 0.0 0.0 - 0.2 K/UL  
 ABS. IMM. GRANS. 0.1 0.0 - 0.5 K/UL METABOLIC PANEL, BASIC Collection Time: 10/16/18  6:06 AM  
Result Value Ref Range Sodium 143 136 - 145 mmol/L Potassium 4.4 3.5 - 5.1 mmol/L Chloride 102 98 - 107 mmol/L  
 CO2 35 (H) 21 - 32 mmol/L Anion gap 6 (L) 7 - 16 mmol/L Glucose 142 (H) 65 - 100 mg/dL BUN 22 8 - 23 MG/DL Creatinine 0.72 0.6 - 1.0 MG/DL  
 GFR est AA >60 >60 ml/min/1.73m2 GFR est non-AA >60 >60 ml/min/1.73m2 Calcium 8.2 (L) 8.3 - 10.4 MG/DL Disposition: home Discharge Medications:  
Current Discharge Medication List  
  
START taking these medications Details  
levoFLOXacin (LEVAQUIN) 500 mg tablet Take 1 Tab by mouth every twenty-four (24) hours for 2 days. Qty: 2 Tab, Refills: 0  
  
nicotine (NICODERM CQ) 14 mg/24 hr patch 1 Patch by TransDERmal route every twenty-four (24) hours for 30 days. Qty: 30 Patch, Refills: 0  
  
predniSONE (DELTASONE) 10 mg tablet Take 2 Tabs by mouth daily (with breakfast) for 5 days. Qty: 10 Tab, Refills: 0  
  
pantoprazole (PROTONIX) 40 mg tablet Take 1 Tab by mouth daily for 5 days. Qty: 5 Tab, Refills: 0 CONTINUE these medications which have NOT CHANGED Details  
albuterol-ipratropium (DUO-NEB) 2.5 mg-0.5 mg/3 ml nebu 3 mL by Nebulization route four (4) times daily. Qty: 360 Nebule, Refills: 3  
  
gabapentin (NEURONTIN) 100 mg capsule Take 1 Cap by mouth nightly. Qty: 30 Cap, Refills: 6 Associated Diagnoses: Pain in both lower extremities  
  
sertraline (ZOLOFT) 100 mg tablet Take 1 Tab by mouth daily. Qty: 30 Tab, Refills: 6  
  
metoprolol succinate (TOPROL-XL) 50 mg XL tablet Take 1 Tab by mouth daily. Qty: 90 Tab, Refills: 3 OXYGEN-AIR DELIVERY SYSTEMS by Does Not Apply route. 2.5 L cont. aspirin delayed-release 81 mg tablet Take 1 Tab by mouth daily. Qty: 30 Tab, Refills: 11  
  
albuterol (PROVENTIL HFA, VENTOLIN HFA, PROAIR HFA) 90 mcg/actuation inhaler Take 2 Puffs by inhalation every four (4) hours as needed for Wheezing. Qty: 1 Inhaler, Refills: 11  
  
budesonide (PULMICORT) 0.5 mg/2 mL nbsp 2 mL by Nebulization route two (2) times a day. Qty: 60 Each, Refills: 11 Activity: Activity as tolerated Diet: Cardiac Diet Wound Care: None needed Follow-up Appointments Procedures  FOLLOW UP VISIT Appointment in: 3 - 5 Days See your primary doctor. See your primary doctor. Standing Status:   Standing Number of Occurrences:   1 Order Specific Question:   Appointment in Answer:   3 - 5 Days I have discussed the plan of care with patient and spouse at bedside. Time spent on discharge is 36 minutes. Signed By: Kathy Argueta MD   
 October 16, 2018

## 2018-10-16 NOTE — PROGRESS NOTES
Out of area for Health . Will be assigned to [facility].   Will need Outpatient Palliative Care follow up in around 2 weeks with Cata Aldrich NP.

## 2018-10-16 NOTE — PROGRESS NOTES
Received bedside shift report from [address]. Patient in bed, in its low and locked position with call bell within reach. Patient alert and oriented x 4 with respirations regular and non-labored.

## 2018-10-16 NOTE — PROGRESS NOTES
Patient will discharge home today. She has home O2 through [address]. Patient has refused home health follow-up at discharge. No other discharge planning needs identified at this time. Case Management will continue to follow. Care Management Interventions PCP Verified by CM: Yes (Dr. Corrinne Koch,) Mode of Transport at Discharge: Other (see comment) (Spouse Lilibeth James)) Transition of Care Consult (CM Consult): Discharge Planning Discharge Durable Medical Equipment: No 
Physical Therapy Consult: No 
Occupational Therapy Consult: No 
Speech Therapy Consult: No 
Current Support Network: Own Home, Lives with Spouse Confirm Follow Up Transport: Family Plan discussed with Pt/Family/Caregiver: Yes Freedom of Choice Offered: Yes [employer] Information Provided?: No 
Discharge Location Discharge Placement: Home

## 2018-10-16 NOTE — PROGRESS NOTES
Discharge instructions and prescriptions provided and explained to the pt. Med side effect sheet reviewed. Opportunity for questions provided. Pt waiting for spouse. Primary nurse will remove IV once spouse arrives. Instructed to call once ready to leave.

## 2018-10-16 NOTE — PROGRESS NOTES
Pt slept through shift with no complaints or issues. Pt is lying in bed, family present at bedside. Resp even, unlabored. SBAR end of shift report given to oncoming RN.

## 2018-10-17 NOTE — PROGRESS NOTES
This note will not be viewable in [address] Transition of Care Discharge Follow-up Questionnaire Date/Time of Call: 
 10/17/18 1600 What was the patient hospitalized for? COPD Does the patient understand his/her diagnosis and/or treatment and what happened during the hospitalization? Yes, pt states she normally smokes 40 cigarettes/day, now down to 3/day. Did the patient receive discharge instructions? Yes  
CM Assessed Risk for Readmission:  
 
Patient stated Risk for Readmission: Pt often declines assistance and has not picked up meds, states will  this evening. Not right now.   
Review any discharge instructions (see discharge instructions/AVS in [facility]). Ask patient if they understand these. Do they have any questions? DC instructions reviewed w/ pt, no questions. Were home services ordered (nursing, PT, OT, ST, etc.)? [facility] If so, has the first visit occurred? If not, why? (Assist with coordination of services if necessary.) No, RNCM confirmed w/ [facility] visit scheduled 10/18/18 w/ lab draw. Was any DME ordered? No, pt is independent w/ ADLs. Pt is on O2 4ltrs continuous. If so, has it been received? If not, why?  (Assist patient in obtaining DME orders &/or equipment if necessary. ) NA Complete a review of all medications (new, continued and discontinued meds per the D/C instructions and medication tab in [address] [place] [address]). Medications reviewed w/  pt. Pt states she uses nebulizer 4x/day. Pt states she just got inhaler today, reviewed purpose, frequency. Were all new prescriptions filled? If not, why?  (Assist patient in obtaining medications if necessary  escalate for CCM &/or SW if ongoing issues are verbalized by pt or anticipated)  will  this evening. Pt states will not be picking up nicotine patches due to cost $100.   
 
  
Does the patient understand the purpose and dosing instructions for all medications? (If patient has questions, provide explanation and education.) Yes Does the patient have any problems in performing ADLs? (If patient is unable to perform ADLs  what is the limiting factor(s)? Do they have a support system that can assist? If no support system is present, discuss possible assistance that they may be able to obtain. Escalate for CCM/SW if ongoing issues are verbalized by pt or anticipated) No,  is present for any needs. Does the patient have all follow-up appointments scheduled? 7 day f/up with PCP?  
(f/up with PCP may be w/in 14 days if patient has a f/up with their specialist w/in 7 days) 7-14 day f/up with specialist?  
(or per discharge instructions) If f/up has not been made  what actions has the care coordinator made to accomplish this? Has transportation been arranged? 10/23/2018 12:00 PM Guanako Bustamante 11/2/2018 4:00 PM Kira Hart Any other questions or concerns expressed by the patient? No  
Schedule next appointment with REY MICHELE Coordinator or refer to RN Case Manager/ per the workflow guidelines. When is care coordinators next follow-up call scheduled? If referred for CCM  what RN care manager was the referral assigned? RNCM scheduled f/u in 1wk. DULCE Call Completed By: Tomasa Dooley Community Nurse Care Mgr.

## 2018-10-17 NOTE — TELEPHONE ENCOUNTER
[address] [place] [address] Ammy Kellogg Pharmacist consult. Mrs. David Tam was discharged yesterday. I explained my part of [facility]. She said she was SOB today. I reviewed her medications. She verified she has taken her nebulization this morning, but still SOB. [facility] I explained each one to her. I emphasized the importance of starting as soon a she receives. She verified understanding. She was unable to take my number right now, but said I could call back any time.

## 2018-10-29 NOTE — TELEPHONE ENCOUNTER
Mrs. Zan Soto said she was doing much better. I reviewed her medications. She has finished her levofloxacin, prednisone and pantoprazole. Her breathing is better and she is improving. No medication additions or changes. I asked her to call with any medication questions or issues.

## 2018-11-05 NOTE — TELEPHONE ENCOUNTER
Mrs. Luis Vázquez said she was feeling badly today. She just did not even want to get out of bed. Not just her breathing, but whole body feels bad. I reviewed her medications. She did not go see Dr. Nicci Nguyen on Friday because she just did not feel like it. Her maintenance meds are the same with nothing new. No questions at this time. I asked her to call with any medication issues. Encouraged her to go see Dr. Nicci Nguyen.

## 2018-11-06 NOTE — PROGRESS NOTES
This note will not be viewable in [address] RNCM attempted to reach pt at home number, no answer. RNCM left vm for pt w/ RNCM contact information. RNCM called 's cell and spoke w/ him, he reports pt is not feeling well, is lethargic, not eating well, and just wants to sleep all the time. He is agreeable to reschedule pt's AWV though would need to be after 3p since he works. RNCM noted pt is current w/ [facility], and has appt w/ Palliative Care on 11/13/18. RNCM called PCP office and rescheduled AWV for 12/3/18 at 4p. RNCM discussed case w/ C LATRICIA Hernández.

## 2018-11-12 NOTE — PROGRESS NOTES
This note will not be viewable in [address] RNCM spoke w/ pt's  regarding COPD. During previous conversation  stated shes lethargic, just wants to sleep all the time, not eating well. Pt now drinking plenty of fluids including Ensure, nebs at least 4x/day.  states pt is constipated. RNCM encouraged fruit and stool softeners,  as per  pt declined using laxative. RNCM also encouraged mobility,  reports pt only gets out of bed while her  is at home bc her legs are weak. He assists her w/ transfers when home, and leaves food next to her while he is away. Discussed resp meds,  confirms compliance, use of nebulizer 4-5x/day. Discussed upcoming appt: 11/13/18 140p [address]. RNCM encouraged  to keep this appt. He believes she needs part-time help but unable to afford private duty care. Next Steps: RNCM to inform Yadira Roberto RN and Palliative Care of above. RNCM scheduled f/u in 1wk.

## 2018-11-13 NOTE — PROGRESS NOTES
This note will not be viewable in [address] RNCM received call from pt's  who reports pt is not able to stand and will not be able to attend appt w/ [address] today. He reports pt is not eating or drinking now, yesterday he reported she is drinking plenty of fluids. Communication: RNCM called [facility] and reported the above information. [facility] states she will attempt visit to pt's home this afternoon. RNCM called  back and left vm regarding [facility] RN visit this afternoon. [facility] to update this RNCM after seeing pt, may discuss home Palliative care vs hospice. Next steps: RNCM to remain available for care coordination, and assist as needed.

## 2018-11-15 PROBLEM — A41.9 SEPSIS (HCC): Status: ACTIVE | Noted: 2018-01-01

## 2018-11-15 PROBLEM — N17.9 AKI (ACUTE KIDNEY INJURY) (HCC): Status: ACTIVE | Noted: 2018-01-01

## 2018-11-15 PROBLEM — E87.1 HYPONATREMIA: Status: ACTIVE | Noted: 2018-01-01

## 2018-11-15 PROBLEM — G93.41 ACUTE METABOLIC ENCEPHALOPATHY: Status: ACTIVE | Noted: 2018-01-01

## 2018-11-15 NOTE — PROGRESS NOTES
Met with patient's  Pelon Ma ([phone]) who states that the patient has gotten weaker in the last few days. He states that he did try an reach out to ambulatory case management about an additional caretaker outside of [address] [place] [address].

## 2018-11-15 NOTE — ED TRIAGE NOTES
Pt arrived ems from home.  states finding pt on he floor this am. Pt has become altered since then. Pt on aspirin. 22 R hand. NSR with occasional PVC. Hx of COPD. EMS states patient being combative and trying to get out of bed. Pt is confused and does not know where she is. 143/89. T 98.6. Pts family states his confusion has started this am. .

## 2018-11-15 NOTE — PROGRESS NOTES
This note will not be viewable in [address] RNCM received call from  stating pt slid off bed and had defecated on herself. He requests resources for assisting w/ pt bathing.  denies injury and reports pt drinking fluids, including 2-3 Ensure/d. He reports pt will not cooperate w/ his requests. RNCM called Loraine E Taylor and informed her of above. Eugenio Cotulla states she can request CNA, pt had previously declined assistance. Eugenio Ortiz will call and discuss w/  when leaving current pt's home. Eugenio Ortiz and this RNCM believe pt is progressing in her illness. RNCM called  back and pt friend arrived to home and assisted  w/ getting pt to bed. RNCM provided him w/ 3 private duty companies contact information to call if he would like on days CNA does not come. RNCM also encouraged to have pt evaluated if she is confused, as something more may be going on with pt.  verbalized understanding and agrees. RNCM will follow w/ C for care coordination.

## 2018-11-15 NOTE — PROGRESS NOTES
TRANSFER - IN REPORT: 
 
Verbal report received from julio(name) on Christie [address]  being received from er(unit) for routine progression of care Report consisted of patients Situation, Background, Assessment and  
Recommendations(SBAR). Information from the following report(s) ED Summary was reviewed with the receiving nurse. Opportunity for questions and clarification was provided. Assessment completed upon patients arrival to unit and care assumed. Awaiting arrival, report to bárbara burger rn charge nurse

## 2018-11-15 NOTE — ED NOTES
This RN cleaned up patient fingers of the feces that was on her. Pt is very combative and does not like being touched.

## 2018-11-15 NOTE — ED PROVIDER NOTES
End-stage COPD with a documented DNR status. Here with altered mental status since earlier this day. She has been confused and somewhat combative and repeatedly calls out for Arvind Ngo. He had evidently fallen out of bed this morning and was assisted back into bed by her . She was found confused and in the bed on EMS arrival.  Most of history is provided by EMS.  said actually slid down and certain did not hit head The history is provided by the EMS personnel and the spouse. Altered mental status This is a new problem. The current episode started 3 to 5 hours ago. Associated symptoms include confusion and agitation. Pertinent negatives include no unresponsiveness. Past Medical History:  
Diagnosis Date  Acute respiratory failure ([facility] [address] 10/2016  
 required intubation at [facility]  Anemia ANEMIA /GI BLEED 1- PRBC 2 UNITS  Arrhythmia HX SVT  CAD (coronary artery disease) aortic valve stenosis pending repair/replacement  Cancer ([facility] [address] 6/2012 BREAST CANCER RIGHT  Cholelithiasis 1/2/2013  COPD exacerbation ([facility] [address] 1/3/2013  Systolic murmur 0/6/9457  Unspecified adverse effect of anesthesia OVERSEDATION, SEVERE HYPOTENSION Past Surgical History:  
Procedure Laterality Date  CARDIAC SURG PROCEDURE UNLIST  2/8/2012  HX AORTIC VALVE REPLACEMENT  2/28/13 Dr. Genaro Delgadillo  HX BREAST LUMPECTOMY  6/2012 8/2012 RIGHT  HX CAROTID ENDARTERECTOMY  12/8/2008 RIGHT  HX CAROTID ENDARTERECTOMY Left 10/2016 Dr. Nicolas Plummer at [facility]; 2008  HX LAP CHOLECYSTECTOMY  09/2010  HX VASCULAR ACCESS  1/2013  
 pic line placed and removed Family History:  
Problem Relation Age of Onset  Stroke Mother  Lung Disease Father   
     Jessica Cobb  Hypertension Other Social History Socioeconomic History  Marital status:  Spouse name: Not on file  Number of children: Not on file  Years of education: Not on file  Highest education level: Not on file Social Needs  Financial resource strain: Not on file  Food insecurity - worry: Not on file  Food insecurity - inability: Not on file  Transportation needs - medical: Not on file  Transportation needs - non-medical: Not on file Occupational History  Occupation: retired Employer: RETIRED Comment: sales at [employer] Tobacco Use  Smoking status: Current Every Day Smoker Packs/day: 0.25 Years: 50.00 Pack years: 12.50 Types: Cigarettes  Smokeless tobacco: Never Used  Tobacco comment: Pt states she smokes about 8 a day. Substance and Sexual Activity  Alcohol use: No  
  Alcohol/week: 0.0 oz  Drug use: No  
 Sexual activity: No  
Other Topics Concern  Not on file Social History Narrative . Lives with . She is originally from [place]. ALLERGIES: Demerol [meperidine]; Gabapentin; Lortab [hydrocodone-acetaminophen]; and Statins-hmg-coa reductase inhibitors Review of Systems Unable to perform ROS: Acuity of condition Constitutional: Negative for chills and fever. Respiratory: Negative. Cardiovascular: Negative. Psychiatric/Behavioral: Positive for agitation and confusion. Vitals:  
 11/15/18 1438 BP: 193/88 Pulse: (!) 120 Resp: 25 Temp: 99.8 °F (37.7 °C) SpO2: 100% Weight: 49.9 kg (110 lb) Height: 5' 3\" (1.6 m) Physical Exam  
Constitutional: She appears ill. She appears distressed. Disoriented. Smells of urine 
stool on hands HENT:  
Head: Atraumatic. Neck: Neck supple. Cardiovascular: Regular rhythm and normal pulses. Tachycardia present. Pulmonary/Chest:  
More rapid but not extremis Abdominal: Soft. She exhibits no distension. There is no tenderness. No pain elicited to palp Musculoskeletal: She exhibits no edema or tenderness. Neurological: She is disoriented. Moves all extremities Skin: She is not diaphoretic. No cyanosis or erythema. No pallor. Nursing note and vitals reviewed. MDM Number of Diagnoses or Management Options Acute metabolic encephalopathy:  
DNR (do not resuscitate):  
Sepsis, due to unspecified organism [facility]): Amount and/or Complexity of Data Reviewed Clinical lab tests: ordered and reviewed Tests in the radiology section of CPT®: reviewed and ordered Decide to obtain previous medical records or to obtain history from someone other than the patient: yes Obtain history from someone other than the patient: yes Discuss the patient with other providers: yes Independent visualization of images, tracings, or specimens: yes Risk of Complications, Morbidity, and/or Mortality Presenting problems: high Diagnostic procedures: low Management options: moderate Critical Care Total time providing critical care: 30-74 minutes (=================================================================== This patient is critically ill and there is a high probability of of imminent or life threatening deterioration in the patient's condition without immediate management. The nature of the patient's clinical problem is: AMS/sepsis I have spent 50 minutes in direct patient care, documentation, review of labs/xrays/old records, discussion with Family, Staff, hospitalist . The time involved in the performance of separately reportable procedures was not counted toward critical care time. Ivis Maldonado MD; 11/16/2018 @12:54 AM 
=================================================================== 
 
) Procedures XR CHEST PORT Final Result IMPRESSION:  Negative for acute change. CT HEAD WO CONT    (Results Pending) Recent Results (from the past 24 hour(s)) CBC WITH AUTOMATED DIFF Collection Time: 11/15/18  2:55 PM  
Result Value Ref Range WBC 20.3 (H) 4.3 - 11.1 K/uL  
 RBC 3.27 (L) 4.05 - 5.2 M/uL HGB 8.8 (L) 11.7 - 15.4 g/dL HCT 28.1 (L) 35.8 - 46.3 % MCV 85.9 79.6 - 97.8 FL  
 MCH 26.9 26.1 - 32.9 PG  
 MCHC 31.3 (L) 31.4 - 35.0 g/dL  
 RDW 15.1 % PLATELET 110 (H) 173 - 450 K/uL MPV 9.7 9.4 - 12.3 FL ABSOLUTE NRBC 0.00 0.0 - 0.2 K/uL  
 DF AUTOMATED NEUTROPHILS 85 (H) 43 - 78 % LYMPHOCYTES 5 (L) 13 - 44 % MONOCYTES 8 4.0 - 12.0 % EOSINOPHILS 0 (L) 0.5 - 7.8 % BASOPHILS 0 0.0 - 2.0 % IMMATURE GRANULOCYTES 1 0.0 - 5.0 %  
 ABS. NEUTROPHILS 17.3 (H) 1.7 - 8.2 K/UL  
 ABS. LYMPHOCYTES 1.1 0.5 - 4.6 K/UL  
 ABS. MONOCYTES 1.7 (H) 0.1 - 1.3 K/UL  
 ABS. EOSINOPHILS 0.0 0.0 - 0.8 K/UL  
 ABS. BASOPHILS 0.0 0.0 - 0.2 K/UL  
 ABS. IMM. GRANS. 0.3 0.0 - 0.5 K/UL METABOLIC PANEL, COMPREHENSIVE Collection Time: 11/15/18  2:55 PM  
Result Value Ref Range Sodium 128 (L) 136 - 145 mmol/L Potassium 3.4 (L) 3.5 - 5.1 mmol/L Chloride 83 (L) 98 - 107 mmol/L  
 CO2 34 (H) 21 - 32 mmol/L Anion gap 11 7 - 16 mmol/L Glucose 163 (H) 65 - 100 mg/dL BUN 20 8 - 23 MG/DL Creatinine 1.38 (H) 0.6 - 1.0 MG/DL  
 GFR est AA 47 (L) >60 ml/min/1.73m2 GFR est non-AA 39 (L) >60 ml/min/1.73m2 Calcium 8.4 8.3 - 10.4 MG/DL Bilirubin, total 1.0 0.2 - 1.1 MG/DL  
 ALT (SGPT) 20 12 - 65 U/L  
 AST (SGOT) 28 15 - 37 U/L Alk. phosphatase 114 50 - 136 U/L Protein, total 7.2 6.3 - 8.2 g/dL Albumin 3.0 (L) 3.2 - 4.6 g/dL Globulin 4.2 (H) 2.3 - 3.5 g/dL A-G Ratio 0.7 (L) 1.2 - 3.5 MAGNESIUM Collection Time: 11/15/18  2:55 PM  
Result Value Ref Range Magnesium 2.0 1.8 - 2.4 mg/dL PROCALCITONIN Collection Time: 11/15/18  2:55 PM  
Result Value Ref Range Procalcitonin 0.3 ng/mL EKG, 12 LEAD, INITIAL Collection Time: 11/15/18  3:01 PM  
Result Value Ref Range Ventricular Rate 128 BPM  
 Atrial Rate 128 BPM  
 P-R Interval 148 ms QRS Duration 112 ms  
 Q-T Interval 314 ms QTC Calculation (Bezet) 458 ms Calculated P Axis 82 degrees Calculated R Axis 130 degrees Calculated T Axis 72 degrees Diagnosis    
  !! AGE AND GENDER SPECIFIC ECG ANALYSIS !! Sinus tachycardia Biatrial enlargement Pulmonary disease pattern Right bundle branch block Septal infarct , age undetermined Abnormal ECG When compared with ECG of 12-OCT-2018 15:03, 
Septal infarct is now Present Non-specific change in ST segment in Anterior leads Confirmed by Yang Zapata MD (), Lois Sterling ([id number]) on 11/15/2018 4:53:25 PM 
  
POC TROPONIN-I Collection Time: 11/15/18  3:08 PM  
Result Value Ref Range Troponin-I (POC) 0.12 (H) 0.02 - 0.05 ng/ml POC LACTIC ACID Collection Time: 11/15/18  3:09 PM  
Result Value Ref Range Lactic Acid (POC) 3.21 (H) 0.5 - 1.9 mmol/L  
URINALYSIS W/ RFLX MICROSCOPIC Collection Time: 11/15/18  5:11 PM  
Result Value Ref Range Color YELLOW Appearance CLEAR Specific gravity 1.013 1.001 - 1.023    
 pH (UA) 7.0 5.0 - 9.0 Protein 100 (A) NEG mg/dL Glucose NEGATIVE  mg/dL Ketone NEGATIVE  NEG mg/dL Bilirubin NEGATIVE  NEG Blood MODERATE (A) NEG Urobilinogen 1.0 0.2 - 1.0 EU/dL Nitrites NEGATIVE  NEG Leukocyte Esterase NEGATIVE  NEG    
 WBC 0-3 0 /hpf  
 RBC 3-5 0 /hpf Epithelial cells 3-5 0 /hpf Bacteria TRACE 0 /hpf Casts HYALINE 0 /lpf Mucus 1+ (H) 0 /lpf Other observations RESULTS VERIFIED MANUALLY    
EKG, 12 LEAD, INITIAL Collection Time: 11/15/18  6:36 PM  
Result Value Ref Range Ventricular Rate 128 BPM  
 Atrial Rate 128 BPM  
 P-R Interval 210 ms QRS Duration 120 ms  
 Q-T Interval 280 ms QTC Calculation (Bezet) 408 ms Calculated P Axis 86 degrees Calculated R Axis 133 degrees Calculated T Axis 85 degrees Diagnosis    
  !! AGE AND GENDER SPECIFIC ECG ANALYSIS !! Sinus tachycardia with 1st degree A-V block with frequent Premature  
ventricular complexes Biatrial enlargement Pulmonary disease pattern Right bundle branch block Abnormal ECG When compared with ECG of 15-NOV-2018 15:01, 
Premature ventricular complexes are now Present MD interval has increased Criteria for Septal infarct are no longer Present Confirmed by ST JAYNE BROWN MD (), MARLENY CID ([id number]) on 11/15/2018 9:08:24 PM 
  
LACTIC ACID Collection Time: 11/15/18  8:13 PM  
Result Value Ref Range Lactic acid 1.5 0.4 - 2.0 MMOL/L  
TSH 3RD GENERATION Collection Time: 11/15/18  8:13 PM  
Result Value Ref Range TSH 0.519 0.358 - 3.740 uIU/mL AMMONIA Collection Time: 11/15/18  8:13 PM  
Result Value Ref Range Ammonia 28 11 - 32 UMOL/L  
POC G3 Collection Time: 11/15/18  8:41 PM  
Result Value Ref Range Device: NASAL CANNULA pH (POC) 7.515 (H) 7.35 - 7.45    
 pCO2 (POC) 49.6 (H) 35 - 45 MMHG  
 pO2 (POC) 151 (H) 75 - 100 MMHG  
 HCO3 (POC) 40.0 (H) 22 - 26 MMOL/L  
 sO2 (POC) 99 (H) 95 - 98 % Base excess (POC) 15 mmol/L Allens test (POC) YES Site RIGHT RADIAL Patient temp. 98.6 Specimen type (POC) ARTERIAL Performed by WoodruffElizabethRT   
 CO2, POC 42 MMOL/L Flow rate (POC) 7.000 L/min Respiratory comment: NurseNotified POC G3 Collection Time: 11/15/18 11:07 PM  
Result Value Ref Range Device: NASAL CANNULA    
 FIO2 (POC) 32 % pH (POC) 7.505 (H) 7.35 - 7.45    
 pCO2 (POC) 47.2 (H) 35 - 45 MMHG  
 pO2 (POC) 109 (H) 75 - 100 MMHG  
 HCO3 (POC) 37.3 (H) 22 - 26 MMOL/L  
 sO2 (POC) 99 (H) 95 - 98 % Base excess (POC) 13 mmol/L Allens test (POC) YES Site RIGHT RADIAL Patient temp. 98.6 Specimen type (POC) ARTERIAL Performed by WoodruffElizabethRT   
 CO2, POC 39 MMOL/L Flow rate (POC) 3.000 L/min Respiratory comment: PhysicianNotified

## 2018-11-16 PROBLEM — Z66 DNR (DO NOT RESUSCITATE): Status: RESOLVED | Noted: 2018-01-01 | Resolved: 2018-01-01

## 2018-11-16 NOTE — PROGRESS NOTES
Disconnected from IVFs. Flushed HW. Assisted in moving pt to stretch. Transported to CT on 4L NC. Bilateral mittens remain in place. Chan patent and draining. Pt quiet and calm at the moment.

## 2018-11-16 NOTE — PROGRESS NOTES
Notified CT pt is calmer down, after premedicating with Haldol for agression. Transport notified to take pt to CT.

## 2018-11-16 NOTE — H&P
HOSPITALIST H&P/CONSULTNAME:  Ben Has Age:  77 y.o. 
:   1940 MRN:   [id number] PCP: Katerina Green MD 
Consulting MD: Treatment Team: Attending Provider: Lizzy Cedeno MD 
HPI:  
60P with pmhx of CAD, s/p AVR, end stage COPD usually on 3lnc but increased to [id number] in last one week presents with AMS. Pt lives at home with her . History taken from him due to her state, however, he is a horrible historian. He does report that patient is typically able to walk independently, cooks and dresses by herself. When asked about bathing he states \"she won't do it. I don't remember the last time she cleaned up\". Reports patient was her usual self until three days ago when she last her ability to ambulate on her own, requiring his assistance to walk short distances. Then yesterday started getting confused and since this morning fell out of bed three times requiring assistance to get back in bed. No report of fever, chills, nausea, vomiting, diarrhea or report of pain. Pt is agitated, unkept and disoriented. ED workup notable for WBC 20K, /85, 's ST, Na 128, Cr 1.38. UA/CXR unremarkable. CT head pending Hospitalist asked to admit for sepsis of unknown source and AMS. Complete ROS limited secondary to patient condition Past Medical History:  
Diagnosis Date  Acute respiratory failure ([facility] [address] 10/2016  
 required intubation at [facility]  Anemia ANEMIA /GI BLEED 2013 PRBC 2 UNITS  Arrhythmia HX SVT  CAD (coronary artery disease) aortic valve stenosis pending repair/replacement  Cancer ([facility] [address] 2012 BREAST CANCER RIGHT  Cholelithiasis 2013  COPD exacerbation ([facility] [address] 1/3/2013  Systolic murmur 2271  Unspecified adverse effect of anesthesia OVERSEDATION, SEVERE HYPOTENSION Past Surgical History:  
Procedure Laterality Date  CARDIAC SURG PROCEDURE UNLIST  2012  HX AORTIC VALVE REPLACEMENT  13 Dr. Shantel De Guzman  HX BREAST LUMPECTOMY  2012 RIGHT  HX CAROTID ENDARTERECTOMY  2008 RIGHT  HX CAROTID ENDARTERECTOMY Left 10/2016 Dr. Jason Foss at [facility];   HX LAP CHOLECYSTECTOMY  2010  HX VASCULAR ACCESS  2013  
 pic line placed and removed Prior to Admission Medications Prescriptions Last Dose Informant Patient Reported? Taking? OXYGEN-AIR DELIVERY SYSTEMS   Yes No  
Si L by Nasal route continuous. albuterol (PROVENTIL HFA, VENTOLIN HFA, PROAIR HFA) 90 mcg/actuation inhaler   No No  
Sig: Take 2 Puffs by inhalation every four (4) hours as needed for Wheezing. albuterol-ipratropium (DUO-NEB) 2.5 mg-0.5 mg/3 ml nebu   No No  
Sig: 3 mL by Nebulization route four (4) times daily. aspirin delayed-release 81 mg tablet   No No  
Sig: Take 1 Tab by mouth daily. budesonide (PULMICORT) 0.5 mg/2 mL nbsp   No No  
Si mL by Nebulization route two (2) times a day.  
gabapentin (NEURONTIN) 100 mg capsule   No No  
Sig: Take 1 Cap by mouth nightly. metoprolol succinate (TOPROL-XL) 50 mg XL tablet   No No  
Sig: Take 1 Tab by mouth daily. nicotine (NICODERM CQ) 14 mg/24 hr patch   No No  
Si Patch by TransDERmal route every twenty-four (24) hours for 30 days. sertraline (ZOLOFT) 100 mg tablet   No No  
Sig: Take 1 Tab by mouth daily. Facility-Administered Medications: None Allergies Allergen Reactions  Demerol [Meperidine] Other (comments) Low blood pressure  Gabapentin Other (comments) Heart Racing  Lortab [Hydrocodone-Acetaminophen] Swelling HYPOTENSION 
  
 Statins-Hmg-Coa Reductase Inhibitors Unknown (comments) Social History Tobacco Use  Smoking status: Current Every Day Smoker Packs/day: 0.25 Years: 50.00 Pack years: 12.50 Types: Cigarettes  Smokeless tobacco: Never Used  Tobacco comment: Pt states she smokes about 8 a day. Substance Use Topics  Alcohol use: No  
  Alcohol/week: 0.0 oz Family History Problem Relation Age of Onset  Stroke Mother  Lung Disease Father   
     Aniya Ortega  Hypertension Other Objective:  
 
Visit Vitals BP (!) 192/102 Pulse (!) 126 Temp 99.8 °F (37.7 °C) Resp 25 Ht 5' 3\" (1.6 m) Wt 49.9 kg (110 lb) SpO2 100% BMI 19.49 kg/m² Temp (24hrs), Av.8 °F (37.7 °C), Min:99.8 °F (37.7 °C), Max:99.8 °F (37.7 °C) Oxygen Therapy O2 Sat (%): 100 % (11/15/18 1801) Pulse via Oximetry: 126 beats per minute (11/15/18 180) O2 Device: Nasal cannula (11/15/18 1438) ETCO2 (mmHg): 5 mmHg (11/15/18 143) Physical Exam: 
General:    Unkempt, disoriented, verbal but nonsensical converation Head:   Normocephalic, without obvious abnormality, atraumatic. Nose:  Nares normal. No drainage or sinus tenderness. Lungs:   Clear to auscultation bilaterally. No Wheezing or Rhonchi. No rales. Heart:   Regular rate and rhythm,  no murmur, rub or gallop. Abdomen:   Soft, non-tender. Not distended. Bowel sounds normal.  
Extremities: No cyanosis. No edema. No clubbing Skin:     Texture, turgor normal. No rashes or lesions. Not Jaundiced Neurologic: disoriented no focal deficits, moving all extremities spontaneously. Not following commands Data Review:  
Recent Results (from the past 24 hour(s)) CBC WITH AUTOMATED DIFF Collection Time: 11/15/18  2:55 PM  
Result Value Ref Range WBC 20.3 (H) 4.3 - 11.1 K/uL  
 RBC 3.27 (L) 4.05 - 5.2 M/uL HGB 8.8 (L) 11.7 - 15.4 g/dL HCT 28.1 (L) 35.8 - 46.3 % MCV 85.9 79.6 - 97.8 FL  
 MCH 26.9 26.1 - 32.9 PG  
 MCHC 31.3 (L) 31.4 - 35.0 g/dL  
 RDW 15.1 % PLATELET 010 (H) 248 - 450 K/uL MPV 9.7 9.4 - 12.3 FL ABSOLUTE NRBC 0.00 0.0 - 0.2 K/uL  
 DF AUTOMATED NEUTROPHILS 85 (H) 43 - 78 % LYMPHOCYTES 5 (L) 13 - 44 % MONOCYTES 8 4.0 - 12.0 % EOSINOPHILS 0 (L) 0.5 - 7.8 %  BASOPHILS 0 0.0 - 2.0 %  
 IMMATURE GRANULOCYTES 1 0.0 - 5.0 %  
 ABS. NEUTROPHILS 17.3 (H) 1.7 - 8.2 K/UL  
 ABS. LYMPHOCYTES 1.1 0.5 - 4.6 K/UL  
 ABS. MONOCYTES 1.7 (H) 0.1 - 1.3 K/UL  
 ABS. EOSINOPHILS 0.0 0.0 - 0.8 K/UL  
 ABS. BASOPHILS 0.0 0.0 - 0.2 K/UL  
 ABS. IMM. GRANS. 0.3 0.0 - 0.5 K/UL METABOLIC PANEL, COMPREHENSIVE Collection Time: 11/15/18  2:55 PM  
Result Value Ref Range Sodium 128 (L) 136 - 145 mmol/L Potassium 3.4 (L) 3.5 - 5.1 mmol/L Chloride 83 (L) 98 - 107 mmol/L  
 CO2 34 (H) 21 - 32 mmol/L Anion gap 11 7 - 16 mmol/L Glucose 163 (H) 65 - 100 mg/dL BUN 20 8 - 23 MG/DL Creatinine 1.38 (H) 0.6 - 1.0 MG/DL  
 GFR est AA 47 (L) >60 ml/min/1.73m2 GFR est non-AA 39 (L) >60 ml/min/1.73m2 Calcium 8.4 8.3 - 10.4 MG/DL Bilirubin, total 1.0 0.2 - 1.1 MG/DL  
 ALT (SGPT) 20 12 - 65 U/L  
 AST (SGOT) 28 15 - 37 U/L Alk. phosphatase 114 50 - 136 U/L Protein, total 7.2 6.3 - 8.2 g/dL Albumin 3.0 (L) 3.2 - 4.6 g/dL Globulin 4.2 (H) 2.3 - 3.5 g/dL A-G Ratio 0.7 (L) 1.2 - 3.5 MAGNESIUM Collection Time: 11/15/18  2:55 PM  
Result Value Ref Range Magnesium 2.0 1.8 - 2.4 mg/dL EKG, 12 LEAD, INITIAL Collection Time: 11/15/18  3:01 PM  
Result Value Ref Range Ventricular Rate 128 BPM  
 Atrial Rate 128 BPM  
 P-R Interval 148 ms QRS Duration 112 ms  
 Q-T Interval 314 ms QTC Calculation (Bezet) 458 ms Calculated P Axis 82 degrees Calculated R Axis 130 degrees Calculated T Axis 72 degrees Diagnosis    
  !! AGE AND GENDER SPECIFIC ECG ANALYSIS !! Sinus tachycardia Biatrial enlargement Pulmonary disease pattern Right bundle branch block Septal infarct , age undetermined Abnormal ECG When compared with ECG of 12-OCT-2018 15:03, 
Septal infarct is now Present Non-specific change in ST segment in Anterior leads Confirmed by Eliana Prater MD (), Lorna Mendez ([id number]) on 11/15/2018 4:53:25 PM 
  
POC TROPONIN-I  Collection Time: 11/15/18  3:08 PM  
 Result Value Ref Range Troponin-I (POC) 0.12 (H) 0.02 - 0.05 ng/ml POC LACTIC ACID Collection Time: 11/15/18  3:09 PM  
Result Value Ref Range Lactic Acid (POC) 3.21 (H) 0.5 - 1.9 mmol/L  
URINALYSIS W/ RFLX MICROSCOPIC Collection Time: 11/15/18  5:11 PM  
Result Value Ref Range Color YELLOW Appearance CLEAR Specific gravity 1.013 1.001 - 1.023    
 pH (UA) 7.0 5.0 - 9.0 Protein 100 (A) NEG mg/dL Glucose NEGATIVE  mg/dL Ketone NEGATIVE  NEG mg/dL Bilirubin NEGATIVE  NEG Blood MODERATE (A) NEG Urobilinogen 1.0 0.2 - 1.0 EU/dL Nitrites NEGATIVE  NEG Leukocyte Esterase NEGATIVE  NEG    
 WBC 0-3 0 /hpf  
 RBC 3-5 0 /hpf Epithelial cells 3-5 0 /hpf Bacteria TRACE 0 /hpf Casts HYALINE 0 /lpf Mucus 1+ (H) 0 /lpf Other observations RESULTS VERIFIED MANUALLY Imaging [facility]by Apgar Joice Griffon Final [99] 11/15/2018 15:15 11/15/2018 15:30 Study Result CHEST X-RAY, one view. 
  
HISTORY:  COPD, confusion and agitation. 
  
TECHNIQUE:  AP supine portable view. 
  
COMPARISON: 2018. 
  
FINDINGS:   The lungs are clear. The heart size is normal. The costophrenic 
angles are sharp. The pulmonary vasculature is unremarkable. Included portion of 
the upper abdomen is unremarkable. There are sternal wires.  
  
IMPRESSION IMPRESSION:  Negative for acute change. 
   
 
 
Assessment and Plan: Active Hospital Problems Diagnosis Date Noted  Sepsis ([facility] [address] 11/15/2018  YOSI (acute kidney injury) ([facility] [address] 11/15/2018  DNR (do not resuscitate) 10/15/2018 After discussion on 10/15/2018.  S/P AVR (aortic valve replacement)-2013  Mixed dyslipidemia 2013  Essential hypertension, benign 2013 A/P 
- Sepsis -(tachycardia, leukocytosis) unknown source. Urine looks ok, urine and blood cultures pending. CXR unremarkable.  Has hx of AVR, will check echo r/o vegitation. Cont vanco/zosyn started in ED. Ck procal. 
- Acute metabolic encephalopathy - ?secondary to hyponatremia. Ck stat CT head r/o bleed with falls, ammonia, tsh, UDS, ABG.  
- Hyponatremia - w/ decreased oral intake. Start NS, repeat in AM. - YOSI - dehydration, ivf as above  
- HTN - resume home meds, prn hydralazine. -COPD -  Stable on 4-5LNC. Cont BD's. Code Status: DNR as reported by  at bedside Anticipated discharge: 3-4 days Signed By: Jimenez Cruz DO November 15, 2018

## 2018-11-16 NOTE — PROGRESS NOTES
Problem: Nutrition Deficit Goal: *Optimize nutritional status Nutrition Reason for assessment: Referral received from nursing admission Malnutrition Screening Tool for unsure weight loss and eating poorly due to decreased appetite. Assessment:  
Diet order(s): Mechanical SoftFood/Nutrition Patient History:  The patient is noted to have a h/o HTN, breast cancer, s/p AVR, COPD and CHF. The patient is present with  at bedside. The patient is currently confused and unable to provide RD with any information. The  states that she has been loosing some weight but cannot recall how much. He states that about 6 months ago she weighed around 136#. He reports that she has stopped eating over the past month. He states that she is roughly consuming ~1/4 the amount of food that she used to. He denies any po difficulties except for constipation. He reports that she does use Ensure at home. Weight history in the EMR cannot be verified as accurate due to unknown weight source (pt stated vs estimated vs measured). Weight Loss Metrics 11/15/2018 10/12/2018 9/10/2018 9/5/2018 Today's Wt 110 lb 110 lb 113 lb 115 lb 9.6 oz BMI 19.49 kg/m2 19.49 kg/m2 20.02 kg/m2 20.48 kg/m2 Weight Loss Metrics 8/17/2018 7/17/2018 6/7/2018 3/20/2018 Today's Wt 107 lb 107 lb 130 lb 129 lb 3.2 oz BMI 18.95 kg/m2 18.95 kg/m2 23.03 kg/m2 22.89 kg/m2 According to the EMR the patient is noted to have a weight loss of ~19# over the past ~8 months. This is a clinically significant weight loss of 14.7% over the past 8 months. Anthropometrics:Height: 5' 3\" (160 cm),  Weight: 49.9 kg (110 lb), Weight Source: unknown, Body mass index is 19.49 kg/m². BMI class of underweight for age >71. RD ordered weight to accurately assess weight loss and current weight status. Macronutrient needs: EER:  [id number] kcal /day (25-30 kcal/kg listed BW) EPR:  50-60 grams protein/day (1-1.2 grams/kg IBW) Intake/Comparative Standards: Too be determined-no po intakes have been recorded at this time. Nutrition Diagnosis: Inadequate oral intake related to decreased appetite as evidenced by weight loss of 19# over 8 months and currently consuming ~1/4 of typical po intake. Intervention: 
Meals and snacks: Continue current diet. Nutrition Supplement Therapy: Add Ensure Enlive TID Nutrition Discharge Plan: Too soon to be determined Sivakumar Concepcion [address]az[address], [address],  [phone]

## 2018-11-16 NOTE — PROGRESS NOTES
Notified Dr. Josue Wilson of repeat ABG results and informed her that we still have been unable to obtain a CT due to the fact that patient remains confused and combative and cannot remain still for the CT. She stated she would order more labs for in the morning. No other telephone orders received at this time.

## 2018-11-16 NOTE — PROGRESS NOTES
Pharmacokinetic Consult to Pharmacist 
 
Ulisses Knight is a 77 y.o. female being treated for sepsis  with vancomycin. Height: 5' 3\" (160 cm)  Weight: 49.9 kg (110 lb) Lab Results Component Value Date/Time BUN 20 11/15/2018 02:55 PM  
 Creatinine 1.38 (H) 11/15/2018 02:55 PM  
 WBC 20.3 (H) 11/15/2018 02:55 PM  
 Procalcitonin 0.3 11/15/2018 02:55 PM  
 Lactic acid 1.5 11/15/2018 08:13 PM  
 Lactic acid 1.9 01/03/2013 09:52 AM  
 Lactic Acid (POC) 3.21 (H) 11/15/2018 03:09 PM  
  
Estimated Creatinine Clearance: 26 mL/min (A) (based on SCr of 1.38 mg/dL (H)). CULTURES: 
All Micro Results Procedure Component Value Units Date/Time CULTURE, URINE [[id number]] Collected:  11/15/18 1711 Order Status:  Completed Specimen:  Cath Urine Updated:  11/15/18 2252 CULTURE, BLOOD [[id number]] Collected:  11/15/18 1736 Order Status:  Completed Specimen:  Blood Updated:  11/15/18 2204 CULTURE, BLOOD [[id number]] Collected:  11/15/18 1455 Order Status:  Completed Specimen:  Blood Updated:  11/15/18 1511 CULTURE, BLOOD [[id number]] Order Status:  Canceled Specimen:  Blood Day 1 of vancomycin. Goal trough is 15-20. Vancomycin dose initiated at 1,250 mg load, followed by 1,000 mg q12h. Will continue to follow patient. Thank you, Steve StanleyD

## 2018-11-16 NOTE — PROGRESS NOTES
Problem: Interdisciplinary Rounds Goal: Interdisciplinary Rounds Interdisciplinary team rounds were held 11/16/2018 with the following team members:Care Management, Physical Therapy, Physician and Clinical Coordinator and the patient. Plan of care discussed. See clinical pathway and/or care plan for interventions and desired outcomes.

## 2018-11-16 NOTE — PROGRESS NOTES
Progress Note Patient: Kelley Patrick MRN: [id number]  SSN: [id number] YOB: 1940  Age: 77 y.o. 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 Admitted on 11- due to acute metabolic encephalopathy. As per , patient was doing \"OK\" until about 3 days ago when she became less alert and less responsive. It appears he does not know much about patient though. This morning, patient was in bed quietly and seemed not to be able to engage in conversations. However, later patient woke up and seemed more alert and willing to eat. Objective:  
 
Vitals:  
 11/15/18 2331 18 0322 18 0730 18 7012 BP: 112/56 129/57 106/57 Pulse: (!) 130 (!) 129 (!) 112 Resp:  Temp: 97.7 °F (36.5 °C) 97.8 °F (36.6 °C) 97.9 °F (36.6 °C) SpO2: 99% 98% 99% 98% Weight:      
Height:      
  
 
Intake and Output: 
Current Shift: No intake/output data recorded. Last three shifts:  1901 -  0700 In: -  
Out: 7443 [EPH] Physical Exam:  
General:                    The patient is an elderly female in no acute distress. lying in bed. Head:                                   Normocephalic/atraumatic. Eyes:                                   No palpebral pallor or scleral icterus. ENT:                                    External auricular and nasal exam within normal limits. Mucous membranes are moist. 
Neck:                                   Supple, non-tender, no JVD. Lungs:                       Clear to auscultation bilaterally without wheezes or crackles. No respiratory distress or accessory muscle use. Heart:                                  Regular rate and rhythm, without murmurs, rubs, or gallops. Abdomen:                  Soft, non-tender, non-distended with normoactive bowel sounds. Genitourinary:           No tenderness over the bladder or bilateral CVAs. Extremities:               Without clubbing, cyanosis, or edema. Skin:                                    Normal color, texture, and turgor. No rashes, lesions, or jaundice. Pulses:                      Radial and dorsalis pedis pulses present 2+ bilaterally. Capillary refill <2s. Neurologic:                CN II-XII grossly intact and symmetrical.  
                                            Moving all four extremities well with no focal deficits. Psychiatric:                confused. Lab/Data Review: 
 
Recent Results (from the past 24 hour(s)) CULTURE, BLOOD Collection Time: 11/15/18  2:55 PM  
Result Value Ref Range Special Requests: RIGHT Antecubital 
    
 GRAM STAIN GRAM POSITIVE RODS    
 GRAM STAIN ANAEROBIC BOTTLE POSITIVE    
 GRAM STAIN     
  RESULTS VERIFIED, PHONED TO AND READ BACK BY SHANNA Nevarez ON 11/26/18 @1053, ADS Culture result: CULTURE IN PROGRESS,FURTHER UPDATES TO FOLLOW    
CBC WITH AUTOMATED DIFF Collection Time: 11/15/18  2:55 PM  
Result Value Ref Range WBC 20.3 (H) 4.3 - 11.1 K/uL  
 RBC 3.27 (L) 4.05 - 5.2 M/uL HGB 8.8 (L) 11.7 - 15.4 g/dL HCT 28.1 (L) 35.8 - 46.3 % MCV 85.9 79.6 - 97.8 FL  
 MCH 26.9 26.1 - 32.9 PG  
 MCHC 31.3 (L) 31.4 - 35.0 g/dL  
 RDW 15.1 % PLATELET 168 (H) 176 - 450 K/uL MPV 9.7 9.4 - 12.3 FL ABSOLUTE NRBC 0.00 0.0 - 0.2 K/uL  
 DF AUTOMATED NEUTROPHILS 85 (H) 43 - 78 % LYMPHOCYTES 5 (L) 13 - 44 % MONOCYTES 8 4.0 - 12.0 % EOSINOPHILS 0 (L) 0.5 - 7.8 % BASOPHILS 0 0.0 - 2.0 % IMMATURE GRANULOCYTES 1 0.0 - 5.0 %  
 ABS. NEUTROPHILS 17.3 (H) 1.7 - 8.2 K/UL  
 ABS. LYMPHOCYTES 1.1 0.5 - 4.6 K/UL  
 ABS. MONOCYTES 1.7 (H) 0.1 - 1.3 K/UL  
 ABS. EOSINOPHILS 0.0 0.0 - 0.8 K/UL ABS. BASOPHILS 0.0 0.0 - 0.2 K/UL  
 ABS. IMM. GRANS. 0.3 0.0 - 0.5 K/UL METABOLIC PANEL, COMPREHENSIVE Collection Time: 11/15/18  2:55 PM  
Result Value Ref Range Sodium 128 (L) 136 - 145 mmol/L Potassium 3.4 (L) 3.5 - 5.1 mmol/L Chloride 83 (L) 98 - 107 mmol/L  
 CO2 34 (H) 21 - 32 mmol/L Anion gap 11 7 - 16 mmol/L Glucose 163 (H) 65 - 100 mg/dL BUN 20 8 - 23 MG/DL Creatinine 1.38 (H) 0.6 - 1.0 MG/DL  
 GFR est AA 47 (L) >60 ml/min/1.73m2 GFR est non-AA 39 (L) >60 ml/min/1.73m2 Calcium 8.4 8.3 - 10.4 MG/DL Bilirubin, total 1.0 0.2 - 1.1 MG/DL  
 ALT (SGPT) 20 12 - 65 U/L  
 AST (SGOT) 28 15 - 37 U/L Alk. phosphatase 114 50 - 136 U/L Protein, total 7.2 6.3 - 8.2 g/dL Albumin 3.0 (L) 3.2 - 4.6 g/dL Globulin 4.2 (H) 2.3 - 3.5 g/dL A-G Ratio 0.7 (L) 1.2 - 3.5 MAGNESIUM Collection Time: 11/15/18  2:55 PM  
Result Value Ref Range Magnesium 2.0 1.8 - 2.4 mg/dL PROCALCITONIN Collection Time: 11/15/18  2:55 PM  
Result Value Ref Range Procalcitonin 0.3 ng/mL EKG, 12 LEAD, INITIAL Collection Time: 11/15/18  3:01 PM  
Result Value Ref Range Ventricular Rate 128 BPM  
 Atrial Rate 128 BPM  
 P-R Interval 148 ms QRS Duration 112 ms  
 Q-T Interval 314 ms QTC Calculation (Bezet) 458 ms Calculated P Axis 82 degrees Calculated R Axis 130 degrees Calculated T Axis 72 degrees Diagnosis    
  !! AGE AND GENDER SPECIFIC ECG ANALYSIS !! Sinus tachycardia Biatrial enlargement Pulmonary disease pattern Right bundle branch block Septal infarct , age undetermined Abnormal ECG When compared with ECG of 12-OCT-2018 15:03, 
Septal infarct is now Present Non-specific change in ST segment in Anterior leads Confirmed by [address] Dmitri Elizabeth MD (), Brooklyn Lindsey ([id number]) on 11/15/2018 4:53:25 PM 
  
POC TROPONIN-I Collection Time: 11/15/18  3:08 PM  
Result Value Ref Range Troponin-I (POC) 0.12 (H) 0.02 - 0.05 ng/ml POC LACTIC ACID  
 Collection Time: 11/15/18  3:09 PM  
Result Value Ref Range Lactic Acid (POC) 3.21 (H) 0.5 - 1.9 mmol/L  
CULTURE, URINE Collection Time: 11/15/18  5:11 PM  
Result Value Ref Range Special Requests: NO SPECIAL REQUESTS Culture result:     
  NO GROWTH AFTER SHORT PERIOD OF INCUBATION. FURTHER RESULTS TO FOLLOW AFTER OVERNIGHT INCUBATION. URINALYSIS W/ RFLX MICROSCOPIC Collection Time: 11/15/18  5:11 PM  
Result Value Ref Range Color YELLOW Appearance CLEAR Specific gravity 1.013 1.001 - 1.023    
 pH (UA) 7.0 5.0 - 9.0 Protein 100 (A) NEG mg/dL Glucose NEGATIVE  mg/dL Ketone NEGATIVE  NEG mg/dL Bilirubin NEGATIVE  NEG Blood MODERATE (A) NEG Urobilinogen 1.0 0.2 - 1.0 EU/dL Nitrites NEGATIVE  NEG Leukocyte Esterase NEGATIVE  NEG    
 WBC 0-3 0 /hpf  
 RBC 3-5 0 /hpf Epithelial cells 3-5 0 /hpf Bacteria TRACE 0 /hpf Casts HYALINE 0 /lpf Mucus 1+ (H) 0 /lpf Other observations RESULTS VERIFIED MANUALLY CULTURE, BLOOD Collection Time: 11/15/18  5:36 PM  
Result Value Ref Range Special Requests: LEFT 
HAND Culture result: NO GROWTH AFTER 8 HOURS    
EKG, 12 LEAD, INITIAL Collection Time: 11/15/18  6:36 PM  
Result Value Ref Range Ventricular Rate 128 BPM  
 Atrial Rate 128 BPM  
 P-R Interval 210 ms QRS Duration 120 ms  
 Q-T Interval 280 ms QTC Calculation (Bezet) 408 ms Calculated P Axis 86 degrees Calculated R Axis 133 degrees Calculated T Axis 85 degrees Diagnosis    
  !! AGE AND GENDER SPECIFIC ECG ANALYSIS !! Sinus tachycardia with 1st degree A-V block with frequent Premature  
ventricular complexes Biatrial enlargement Pulmonary disease pattern Right bundle branch block Abnormal ECG When compared with ECG of 15-NOV-2018 15:01, 
Premature ventricular complexes are now Present DC interval has increased Criteria for Septal infarct are no longer Present Confirmed by ST JAYNE BROWN MD (), MARLENY CID ([id number]) on 11/15/2018 9:08:24 PM 
  
LACTIC ACID Collection Time: 11/15/18  8:13 PM  
Result Value Ref Range Lactic acid 1.5 0.4 - 2.0 MMOL/L  
TSH 3RD GENERATION Collection Time: 11/15/18  8:13 PM  
Result Value Ref Range TSH 0.519 0.358 - 3.740 uIU/mL AMMONIA Collection Time: 11/15/18  8:13 PM  
Result Value Ref Range Ammonia 28 11 - 32 UMOL/L  
POC G3 Collection Time: 11/15/18  8:41 PM  
Result Value Ref Range Device: NASAL CANNULA pH (POC) 7.515 (H) 7.35 - 7.45    
 pCO2 (POC) 49.6 (H) 35 - 45 MMHG  
 pO2 (POC) 151 (H) 75 - 100 MMHG  
 HCO3 (POC) 40.0 (H) 22 - 26 MMOL/L  
 sO2 (POC) 99 (H) 95 - 98 % Base excess (POC) 15 mmol/L Allens test (POC) YES Site RIGHT RADIAL Patient temp. 98.6 Specimen type (POC) ARTERIAL Performed by WoodruffElizabethRT   
 CO2, POC 42 MMOL/L Flow rate (POC) 7.000 L/min Respiratory comment: NurseNotified POC G3 Collection Time: 11/15/18 11:07 PM  
Result Value Ref Range Device: NASAL CANNULA    
 FIO2 (POC) 32 % pH (POC) 7.505 (H) 7.35 - 7.45    
 pCO2 (POC) 47.2 (H) 35 - 45 MMHG  
 pO2 (POC) 109 (H) 75 - 100 MMHG  
 HCO3 (POC) 37.3 (H) 22 - 26 MMOL/L  
 sO2 (POC) 99 (H) 95 - 98 % Base excess (POC) 13 mmol/L Allens test (POC) YES Site RIGHT RADIAL Patient temp. 98.6 Specimen type (POC) ARTERIAL Performed by WoodruffElizabethRT   
 CO2, POC 39 MMOL/L Flow rate (POC) 3.000 L/min Respiratory comment: PhysicianNotified CBC W/O DIFF Collection Time: 11/16/18  5:46 AM  
Result Value Ref Range WBC 16.9 (H) 4.3 - 11.1 K/uL  
 RBC 2.91 (L) 4.05 - 5.2 M/uL HGB 7.8 (L) 11.7 - 15.4 g/dL HCT 25.3 (L) 35.8 - 46.3 % MCV 86.9 79.6 - 97.8 FL  
 MCH 26.8 26.1 - 32.9 PG  
 MCHC 30.8 (L) 31.4 - 35.0 g/dL  
 RDW 15.1 % PLATELET 599 (H) 805 - 450 K/uL MPV 9.8 9.4 - 12.3 FL ABSOLUTE NRBC 0.00 0.0 - 0.2 K/uL METABOLIC PANEL, BASIC  
 Collection Time: 11/16/18  5:46 AM  
Result Value Ref Range Sodium 135 (L) 136 - 145 mmol/L Potassium 3.0 (L) 3.5 - 5.1 mmol/L Chloride 91 (L) 98 - 107 mmol/L  
 CO2 30 21 - 32 mmol/L Anion gap 14 7 - 16 mmol/L Glucose 88 65 - 100 mg/dL BUN 16 8 - 23 MG/DL Creatinine 0.96 0.6 - 1.0 MG/DL  
 GFR est AA >60 >60 ml/min/1.73m2 GFR est non-AA 60 (L) >60 ml/min/1.73m2 Calcium 7.7 (L) 8.3 - 10.4 MG/DL All Micro Results Procedure Component Value Units Date/Time CULTURE, BLOOD [[id number]] Collected:  11/15/18 1455 Order Status:  Completed Specimen:  Blood Updated:  11/16/18 1054 Special Requests: --     
  RIGHT Antecubital 
  
  GRAM STAIN GRAM POSITIVE RODS ANAEROBIC BOTTLE POSITIVE RESULTS VERIFIED, PHONED TO AND READ BACK BY SHANNA Nevarez ON 11/26/18 @1053, ADS Culture result:    
  CULTURE IN PROGRESS,FURTHER UPDATES TO FOLLOW CULTURE, URINE [[id number]] Collected:  11/15/18 1711 Order Status:  Completed Specimen:  Cath Urine Updated:  11/16/18 0001 Special Requests: NO SPECIAL REQUESTS Culture result:    
  NO GROWTH AFTER SHORT PERIOD OF INCUBATION. FURTHER RESULTS TO FOLLOW AFTER OVERNIGHT INCUBATION. CULTURE, BLOOD [[id number]] Collected:  11/15/18 1736 Order Status:  Completed Specimen:  Blood Updated:  11/16/18 2103 Special Requests: --     
  LEFT 
HAND Culture result: NO GROWTH AFTER 8 HOURS     
 CULTURE, BLOOD [[id number]] Order Status:  Canceled Specimen:  Blood XR chest  
11- IMPRESSION:  Negative for acute change. CT head 11- IMPRESSION: 
No acute intracranial abnormality. 
  
White matter changes likely chronic small vessel ischemia in this age patient.  
 
 
Current Facility-Administered Medications:  
  vancomycin (VANCOCIN) 750 mg in  ml infusion, 750 mg, IntraVENous, Q24H, Marci Flores DO 
   piperacillin-tazobactam (ZOSYN) 4.5 g in 0.9% sodium chloride (MBP/ADV) 100 mL, 4.5 g, IntraVENous, Q8H, Araceli Nayak MD, Last Rate: 25 mL/hr at 11/16/18 1114, 4.5 g at 11/16/18 1114 
  0.9% sodium chloride infusion, 125 mL/hr, IntraVENous, CONTINUOUS, Topher Jacobsen DO, Last Rate: 125 mL/hr at 11/16/18 0128, 125 mL/hr at 11/16/18 0128   albuterol-ipratropium (DUO-NEB) 2.5 MG-0.5 MG/3 ML, 3 mL, Nebulization, Q4H PRN, Marci Flores, DO 
  albuterol-ipratropium (DUO-NEB) 2.5 MG-0.5 MG/3 ML, 3 mL, Nebulization, Q6H RT, Marci Flores C, DO, 3 mL at 11/16/18 3734   aspirin delayed-release tablet 81 mg, 81 mg, Oral, DAILY, Marciano Floresison C, DO, 81 mg at 11/16/18 1114   budesonide (PULMICORT) 500 mcg/2 ml nebulizer suspension, 500 mcg, Nebulization, BID RT, Marci Flores C, DO, 500 mcg at 11/16/18 0810 
  gabapentin (NEURONTIN) capsule 100 mg, 100 mg, Oral, QHS, Caryn Flores DO, Stopped at 11/15/18 2200 
  metoprolol succinate (TOPROL-XL) XL tablet 50 mg, 50 mg, Oral, DAILY, Marciano Floresison C, DO, 50 mg at 11/16/18 1114 
  nicotine (NICODERM CQ) 14 mg/24 hr patch 1 Patch, 1 Patch, TransDERmal, Q24H, Marci Flores C, DO, 1 Patch at 11/15/18 1829   sertraline (ZOLOFT) tablet 100 mg, 100 mg, Oral, DAILY, Flores, Marci C, DO, 100 mg at 11/16/18 1114 
  sodium chloride (NS) flush 5-10 mL, 5-10 mL, IntraVENous, Q8H, Marciano Floresison C, DO, 5 mL at 11/16/18 0529   sodium chloride (NS) flush 5-10 mL, 5-10 mL, IntraVENous, PRN, Marci Flores, DO 
  acetaminophen (TYLENOL) tablet 650 mg, 650 mg, Oral, Q4H PRN, Marci Flores,  
  heparin (porcine) injection 5,000 Units, 5,000 Units, SubCUTAneous, Q8H, Darion Wood DO, 5,000 Units at 11/16/18 6439   ondansetron (ZOFRAN) injection 4 mg, 4 mg, IntraVENous, Q4H PRN, Marci Flores, DO 
  hydrALAZINE (APRESOLINE) 20 mg/mL injection 20 mg, 20 mg, IntraVENous, Q6H PRN, Domingo RANGEL, DO 
   VANCOMYCIN INFORMATION NOTE, , Other, Rx Dosing/Monitoring, Kian Flores,  
  haloperidol lactate (HALDOL) injection 2 mg, 2 mg, IntraVENous, Q4H PRN, Jolly Martell MD, 2 mg at 11/16/18 6343 
 
  
 
Assessment:  
 
Active Problems: 
  Essential hypertension, benign (1/2/2013) Mixed dyslipidemia (2/28/2013) S/P AVR (aortic valve replacement)-2/28/13 (2/28/2013) DNR (do not resuscitate) (10/15/2018) Overview: After discussion on 10/15/2018. Sepsis ([address] (11/15/2018) YOSI (acute kidney injury) ([address] (11/15/2018) Hyponatremia (11/15/2018) Acute metabolic encephalopathy (34/28/3207) Plan:  
 
Acute metabolic encephalopathy Likely combinations of factors are responsible for this including sepsis, YOSI, hyponatremia,  
Continue current empiric antibiotics, Vancomycin and Zosyn. YOSI and hyponatremia is improving. K is low, will replace. Monitor. Patient is a full code. I have confirmed with . Will eliminate DNR from record. S/P AVR, not on [address]. Noted. I have discussed the plan of care with patient. DVT prophylaxis : heparin SC Signed By: Shanta Bradley MD   
 November 16, 2018

## 2018-11-16 NOTE — PROGRESS NOTES
Received bedside shift report from Gabrielle Frey, [address] [place] [address]. Patient in bed, in its low and locked position with call bell within reach. Patient alert and oriented x 1 with respirations regular and non-labored.

## 2018-11-16 NOTE — CONSULTS
CONSULT NOTE Evon Whyte 2018 Date of Admission:  11/15/2018 The patient's chart is reviewed and the patient is discussed with the staff. Subjective:  
 
Patient is a 77 y.o.  female seen and evaluated at the request of Dr. Jon Bah. She has history of end stage COPD on 3-4LNC who presented with AMS after questionable fall vs just sliding off her chair. She wakes up is oriented x 1 and doesn't provide valuable history. . The  is not able to offer much history other than she was doing okay until he found her slumped onto the ground speaking gibberish. Confusion was primary concern for admission. Head CT was negative. Review of Systems Review of systems not obtained due to patient factors. Patient Active Problem List  
Diagnosis Code  Essential hypertension, benign I10  Cholelithiasis K80.20  Carotid stenosis I65.29  
 Breast cancer (HCC) C50.919  
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  
 Abnormal chest CT R93.89  
 Mixed dyslipidemia E78.2  
 COPD, very severe ([address] J44.9  Hypoxemia R09.02  
 S/P AVR (aortic valve replacement)-13 Z95.2  Diastolic heart failure (HCC) I50.30  Coronary atherosclerosis I25.10  Adenopathy R59.1  Heart valve replaced by other means Z95.4  CVD (cerebrovascular disease), unspecified I67.9  
 COPD, frequent exacerbations ([address] J44.1  DNR (do not resuscitate) [phone]  Sepsis ([address] A41.9  YOSI (acute kidney injury) ([address] N17.9  Hyponatremia E87.1  Acute metabolic encephalopathy T58.38 Prior to Admission Medications Prescriptions Last Dose Informant Patient Reported? Taking? OXYGEN-AIR DELIVERY SYSTEMS 11/15/2018 at Unknown time  Yes Yes Si L by Nasal route continuous. albuterol (PROVENTIL HFA, VENTOLIN HFA, PROAIR HFA) 90 mcg/actuation inhaler 11/15/2018 at Unknown time  No Yes Sig: Take 2 Puffs by inhalation every four (4) hours as needed for Wheezing. albuterol-ipratropium (DUO-NEB) 2.5 mg-0.5 mg/3 ml nebu 11/15/2018 at Unknown time  No Yes Sig: 3 mL by Nebulization route four (4) times daily. aspirin delayed-release 81 mg tablet Unknown at Unknown time  No No  
Sig: Take 1 Tab by mouth daily. budesonide (PULMICORT) 0.5 mg/2 mL nbsp 11/15/2018 at Unknown time  No Yes Si mL by Nebulization route two (2) times a day.  
gabapentin (NEURONTIN) 100 mg capsule Unknown at Unknown time  No No  
Sig: Take 1 Cap by mouth nightly. metoprolol succinate (TOPROL-XL) 50 mg XL tablet Unknown at Unknown time  No No  
Sig: Take 1 Tab by mouth daily. nicotine (NICODERM CQ) 14 mg/24 hr patch Not Taking at Unknown time  No No  
Si Patch by TransDERmal route every twenty-four (24) hours for 30 days. sertraline (ZOLOFT) 100 mg tablet Unknown at Unknown time  No No  
Sig: Take 1 Tab by mouth daily. Facility-Administered Medications: None Past Medical History:  
Diagnosis Date  Acute respiratory failure ([facility] [facility] 10/2016  
 [facility]ubation at [facility]  Anemia ANEMIA /GI BLEED 2013 PRBC 2 UNITS  Arrhythmia HX SVT  CAD (coronary artery disease) aortic valve stenosis pending repair/replacement  Cancer ([facility] 2012 BREAST CANCER RIGHT  Cholelithiasis 2013  COPD exacerbation ([facility] [address] 1/3/2013  Systolic murmur 6203  Unspecified adverse effect of anesthesia OVERSEDATION, SEVERE HYPOTENSION Past Surgical History:  
Procedure Laterality Date  CARDIAC SURG PROCEDURE UNLIST  2012  HX AORTIC VALVE REPLACEMENT  13 Dr. Genaro Delgadillo  HX BREAST LUMPECTOMY  2012 RIGHT  HX CAROTID ENDARTERECTOMY  2008 RIGHT  HX CAROTID ENDARTERECTOMY Left 10/2016 Dr. Nicolas Plummer at [facility];   HX LAP CHOLECYSTECTOMY  2010  HX VASCULAR ACCESS  2013  
 pic line placed and removed Social History Socioeconomic History  Marital status:  Spouse name: Not on file  Number of children: Not on file  Years of education: Not on file  Highest education level: Not on file Social Needs  Financial resource strain: Not on file  Food insecurity - worry: Not on file  Food insecurity - inability: Not on file  Transportation needs - medical: Not on file  Transportation needs - non-medical: Not on file Occupational History  Occupation: retired Employer: RETIRED Comment: sales at [employer] Tobacco Use  Smoking status: Current Every Day Smoker Packs/day: 0.25 Years: 50.00 Pack years: 12.50 Types: Cigarettes  Smokeless tobacco: Never Used  Tobacco comment: Pt states she smokes about 8 a day. Substance and Sexual Activity  Alcohol use: No  
  Alcohol/week: 0.0 oz  Drug use: No  
 Sexual activity: No  
Other Topics Concern  Not on file Social History Narrative . Lives with . She is originally from [place]. Family History Problem Relation Age of Onset  Stroke Mother  Lung Disease Father   
     Ayden Partha  Hypertension Other Allergies Allergen Reactions  Demerol [Meperidine] Other (comments) Low blood pressure  Gabapentin Other (comments) Heart Racing  Lortab [Hydrocodone-Acetaminophen] Swelling HYPOTENSION 
  
 Statins-Hmg-Coa Reductase Inhibitors Unknown (comments) Current Facility-Administered Medications Medication Dose Route Frequency  vancomycin (VANCOCIN) 750 mg in  ml infusion  750 mg IntraVENous Q24H  piperacillin-tazobactam (ZOSYN) 4.5 g in 0.9% sodium chloride (MBP/ADV) 100 mL  4.5 g IntraVENous Q8H  
 0.9% sodium chloride infusion  125 mL/hr IntraVENous CONTINUOUS  
 albuterol-ipratropium (DUO-NEB) 2.5 MG-0.5 MG/3 ML  3 mL Nebulization Q4H PRN  
 albuterol-ipratropium (DUO-NEB) 2.5 MG-0.5 MG/3 ML  3 mL Nebulization Q6H RT  
  aspirin delayed-release tablet 81 mg  81 mg Oral DAILY  budesonide (PULMICORT) 500 mcg/2 ml nebulizer suspension  500 mcg Nebulization BID RT  
 gabapentin (NEURONTIN) capsule 100 mg  100 mg Oral QHS  metoprolol succinate (TOPROL-XL) XL tablet 50 mg  50 mg Oral DAILY  nicotine (NICODERM CQ) 14 mg/24 hr patch 1 Patch  1 Patch TransDERmal Q24H  sertraline (ZOLOFT) tablet 100 mg  100 mg Oral DAILY  sodium chloride (NS) flush 5-10 mL  5-10 mL IntraVENous Q8H  
 sodium chloride (NS) flush 5-10 mL  5-10 mL IntraVENous PRN  
 acetaminophen (TYLENOL) tablet 650 mg  650 mg Oral Q4H PRN  
 heparin (porcine) injection 5,000 Units  5,000 Units SubCUTAneous Q8H  
 ondansetron (ZOFRAN) injection 4 mg  4 mg IntraVENous Q4H PRN  
 hydrALAZINE (APRESOLINE) 20 mg/mL injection 20 mg  20 mg IntraVENous Q6H PRN  
 VANCOMYCIN INFORMATION NOTE   Other Rx Dosing/Monitoring  haloperidol lactate (HALDOL) injection 2 mg  2 mg IntraVENous Q4H PRN Objective:  
 
Vitals:  
 11/15/18 2331 11/16/18 0322 11/16/18 0730 11/16/18 4258 BP: 112/56 129/57 106/57 Pulse: (!) 130 (!) 129 (!) 112 Resp: 20 18 18 Temp: 97.7 °F (36.5 °C) 97.8 °F (36.6 °C) 97.9 °F (36.6 °C) SpO2: 99% 98% 99% 98% Weight:      
Height: PHYSICAL EXAM  
 
Constitutional:  the patient is well developed and in no acute distress EENMT:  Sclera clear, pupils equal, oral mucosa moist 
Respiratory: Diminished bilaterally, no wheeze, breathing appears comfortable Cardiovascular:  RRR without M,G,R 
Gastrointestinal: soft and non-tender; with positive bowel sounds. Musculoskeletal: warm without cyanosis. There is no lower leg edema. Skin:  no jaundice or rashes, no wounds Neurologic: no gross neuro deficits Psychiatric:  Sleeping, but wakes opens eyes to voice and oriented x 1 CXR:  No acute infiltrate, hyperinflated Recent Labs 11/16/18 
0546 11/15/18 
1455 WBC 16.9* 20.3* HGB 7.8* 8.8* HCT 25.3* 28.1*  
 * 592* Recent Labs 11/16/18 
0546 11/15/18 
1455 * 128* K 3.0* 3.4*  
CL 91* 83* GLU 88 163* CO2 30 34* BUN 16 20 CREA 0.96 1.38* MG  --  2.0  
CA 7.7* 8.4 ALB  --  3.0* TBILI  --  1.0 ALT  --  20 SGOT  --  28 No results for input(s): PH, PCO2, PO2, HCO3 in the last 72 hours. Recent Labs 11/15/18 2013 LAC 1.5 Assessment:  (Medical Decision Making) Hospital Problems  Date Reviewed: 10/15/2018 Codes Class Noted POA Sepsis ([facility] [address] ICD-10-CM: A41.9 ICD-9-CM: 038.9, 995.91  11/15/2018 Yes YOSI (acute kidney injury) ([facility] [address] ICD-10-CM: N17.9 ICD-9-CM: 584.9  11/15/2018 Yes Hyponatremia ICD-10-CM: E87.1 ICD-9-CM: 276.1  11/15/2018 Yes Acute metabolic encephalopathy TYG-63-YY: G93.41 
ICD-9-CM: 348.31  11/15/2018 Yes DNR (do not resuscitate) ICD-10-CM: A11 ICD-9-CM: V49.86  10/15/2018 Yes Overview Signed 10/15/2018  9:24 AM by Lynda Hernandez NP After discussion on 10/15/2018. Mixed dyslipidemia (Chronic) ICD-10-CM: A23.6 ICD-9-CM: 272.2  2/28/2013 Yes S/P AVR (aortic valve replacement)-2/28/13 (Chronic) ICD-10-CM: Z95.2 ICD-9-CM: V43.3  2/28/2013 Yes Essential hypertension, benign (Chronic) ICD-10-CM: I10 
ICD-9-CM: 401.1  1/2/2013 Yes 78 y/o female with end stage COPD admitted with AMS on home O2 3-4L, does not appear to have an acute change in respiratory status. Had ABG with acute respiratory alkalosis on chronic respiratory acidosis likely from agitation and acute illness with AMS. Plan:  (Medical Decision Making) --no acute respiratory issue is identified 
--no pneumonia or COPD exacerbation for antibiotic or systemic steroid need 
--continue nebs and O2 for COPD supportive care --Will sign off, please call with questions More than 50% of the time documented was spent in face-to-face contact with the patient and in the care of the patient on the floor/unit where the patient is located. Thank you very much for this referral.  We appreciate the opportunity to participate in this patient's care. Will follow along with above stated plan.  
 
Conception MD Jeyson

## 2018-11-16 NOTE — PROGRESS NOTES
Initial visit to assess pt's spiritual needs. Pt was sleeping; left a card.  
 
 
kishan Sheets MDiv,ThM,PhD

## 2018-11-16 NOTE — PROGRESS NOTES
Pharmacokinetic Consult to Pharmacist 
 
Brooks Matias is a 77 y.o. female being treated for sepsis of unknown etiology with vancomycin. Height: 5' 3\" (160 cm)  Weight: 49.9 kg (110 lb) Lab Results Component Value Date/Time BUN 20 11/15/2018 02:55 PM  
 Creatinine 1.38 (H) 11/15/2018 02:55 PM  
 WBC 20.3 (H) 11/15/2018 02:55 PM  
 Lactic acid 1.9 01/03/2013 09:52 AM  
 Lactic acid 1.0 12/10/2008 12:14 PM  
 Lactic Acid (POC) 3.21 (H) 11/15/2018 03:09 PM  
  
Estimated Creatinine Clearance: 26 mL/min (A) (based on SCr of 1.38 mg/dL (H)). CULTURES: 
 
All Micro Results Procedure Component Value Units Date/Time CULTURE, BLOOD [[id number]] Collected:  11/15/18 1736 Order Status:  Completed Specimen:  Blood Updated:  11/15/18 1756 CULTURE, BLOOD [[id number]] Collected:  11/15/18 1455 Order Status:  Completed Specimen:  Blood Updated:  11/15/18 1511 CULTURE, URINE [[id number]] Order Status:  Sent Specimen:  Cath Urine CULTURE, BLOOD [[id number]] Order Status:  Canceled Specimen:  Blood No results found for: Angeles Garrett Day 1 of vancomycin. Goal trough is 15-20. Patient presents with an YOSI. Her Scr is nearly double her baseline values. Will load this patient with vancomycin 1250 mg x 1 dose. Will then dose intermittently by levels until renal function stabilizes. Will continue to follow patient. Thank you, Lynn Grider, PharmD, [facility] Clinical Pharmacist 
[phone]

## 2018-11-16 NOTE — ED NOTES
TRANSFER - OUT REPORT: 
 
Verbal report given to LATRICIA Ahmadi (name) on Gt Taylor  being transferred to room 837 (unit) for routine progression of care Report consisted of patients Situation, Background, Assessment and  
Recommendations(SBAR). Information from the following report(s) ED Summary was reviewed with the receiving nurse. Lines:  
Peripheral IV 11/15/18 Right Antecubital (Active) Site Assessment Clean, dry, & intact 11/15/2018  2:49 PM  
Phlebitis Assessment 0 11/15/2018  2:49 PM  
Infiltration Assessment 0 11/15/2018  2:49 PM  
Dressing Status Clean, dry, & intact 11/15/2018  2:49 PM  
Dressing Type 4 X 4 11/15/2018  2:49 PM  
  
 
Opportunity for questions and clarification was provided. Patient transported with: 
 Pt belongings, fluids, vanc

## 2018-11-16 NOTE — PROGRESS NOTES
Care Management Interventions PCP Verified by CM: Yes Physical Therapy Consult: No 
Occupational Therapy Consult: No 
Current Support Network: Lives with Spouse Confirm Follow Up Transport: Family Plan discussed with Pt/Family/Caregiver: Yes Freedom of Choice Offered: Yes Discharge Location Discharge Placement: Unable to determine at this time CM spoke with patient's spouse. He states that it was one month ago when patient became ill and she was no longer able to care for herself. He was doing \"everything\" for her with no support. Patient is current with [facility] RN. Patient was scheduled to meet with palliative care twice but was a no show. CM called Liamlindsey Bowen with Outpatient Palliative Care. She recommends hospice for patient. CM contacted Dr. Bardales Nim he agreed with hospice  if patient's spouse was agreeable to this plan. CM called spouse and he's willing to meet with hospice on Monday. CM will place hospice consult and arrange for meeting on Monday.

## 2018-11-16 NOTE — PROGRESS NOTES
Notified Dr. Re Mayers of ABG results. Dr. Re Mayers feels that she is hyperventilating, and needs to calm down. Pt very agitated and combative. CT was unable to perform test due to agitation. Verbal order given to repeat the ABG in two hours and to give prn Haldol as already ordered on the [facility]. Also received a verbal order for a pulmonary consult, which Dr. Re Mayers said can be done in the morning.

## 2018-11-16 NOTE — PROGRESS NOTES
Remains restless and thrashing about in the bed. Still unable to send down for CT due to her inability to remain still at this time. Alert to self only. Unable to follow commands. Not alert enough to swallow pills at this time.

## 2018-11-17 NOTE — PROGRESS NOTES
End of shift report given to oncoming dayshift nurse. In bed, resting quietly, NAD. Pt safety maintained throughout shift.

## 2018-11-17 NOTE — PROGRESS NOTES
Pharmacokinetic Consult to Pharmacist 
 
Dionne Fields is a 77 y.o. female being treated for sepsis. Height: 5' 3\" (160 cm)  Weight: (family refused) Lab Results Component Value Date/Time BUN 16 11/17/2018 05:39 AM  
 Creatinine 1.17 (H) 11/17/2018 05:39 AM  
 WBC 10.6 11/17/2018 05:39 AM  
 Procalcitonin 0.3 11/15/2018 02:55 PM  
 Lactic acid 1.5 11/15/2018 08:13 PM  
 Lactic acid 1.9 01/03/2013 09:52 AM  
 Lactic Acid (POC) 3.21 (H) 11/15/2018 03:09 PM  
  
Estimated Creatinine Clearance: 30.7 mL/min (A) (based on SCr of 1.17 mg/dL (H)). CULTURES: 
All Micro Results Procedure Component Value Units Date/Time CULTURE, BLOOD [[id number]]  (Abnormal) Collected:  11/15/18 1455 Order Status:  Completed Specimen:  Blood Updated:  11/17/18 1354 Special Requests: --     
  RIGHT Antecubital 
  
  GRAM STAIN GRAM POSITIVE RODS AEROBIC AND ANAEROBIC BOTTLES RESULTS VERIFIED, PHONED TO AND READ BACK BY SHANNA GRIDER ON 11/26/18 @1053, ADS  
     
   GRAM POSITIVE COCCI RESULTS VERIFIED, PHONED TO AND READ BACK BY CAMERON Velazco RN @ 7989 ON 11.16.18 BY KJ  
     
  Culture result:    
  GRAM POSITIVE COCCI ANAEROBIC BOTTLE POSITIVE SUBCULTURE IN PROGRESS  
     
      
  CULTURE IN PROGRESS,FURTHER UPDATES TO FOLLOW CULTURE, URINE [[id number]] Collected:  11/15/18 1711 Order Status:  Completed Specimen:  Cath Urine Updated:  11/17/18 1281 Special Requests: NO SPECIAL REQUESTS Culture result: NO GROWTH 1 DAY     
 CULTURE, BLOOD [[id number]] Collected:  11/15/18 1736 Order Status:  Completed Specimen:  Blood Updated:  11/17/18 5610 Special Requests: --     
  LEFT 
HAND Culture result: NO GROWTH 2 DAYS     
 CULTURE, BLOOD [[id number]] Collected:  11/17/18 0539 Order Status:  Completed Specimen:  Blood Updated:  11/17/18 2899 CULTURE, BLOOD [[id number]] Collected:  11/17/18 0518 Order Status:  Completed Specimen:  Blood Updated:  11/17/18 0636 CULTURE, BLOOD [[id number]] Order Status:  Canceled Specimen:  Blood Lab Results Component Value Date/Time Vancomycin,trough 14.3 11/17/2018 04:10 PM  
 
 
Day 3 of vancomycin. Goal trough is 15-20. I will continue the current ordered dose. SrCr has increased some over past 24h. Trough result is near goal range of 15-20. I would expect some accumulation. Continue to trend markers of renal function. Pharmacist will continue to follow patient. Please call with any questions. Thank you, Lina Sterling, PharmD, [facility] Clinical Pharmacist 
[phone]

## 2018-11-17 NOTE — PROGRESS NOTES
Progress Note Patient: Kelvin Dawson MRN: [id number]  SSN: [id number] YOB: 1940  Age: 77 y.o. 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 Admitted on 11- due to acute metabolic encephalopathy. As per , patient was doing \"OK\" until about 3 days ago when she became less alert and less responsive. It appears he does not know much about patient though. Patient has been more alert and responsive since 11-. Today she has been in bed and alert. She has been eating better. No chest pain. No fever. No shortness of breath. Objective:  
 
Vitals:  
 11/16/18 9796 11/17/18 0247 11/17/18 0319 11/17/18 9544 BP: 102/70  106/55 108/63 Pulse: 80  74 83 Resp: 18 18 18 Temp: 97.7 °F (36.5 °C)  98.6 °F (37 °C) 98 °F (36.7 °C) SpO2: 99% 99% 100% 100% Weight:      
Height:      
  
 
Intake and Output: 
Current Shift: No intake/output data recorded. Last three shifts: 11/15 1901 - 11/17 0700 In: 120 [P.O.:120] Out: 2050 [Urine:2050] Physical Exam:  
General:                    The patient is an elderly female in no acute distress. sitting up in bed. Talking. Head:                                   Normocephalic/atraumatic. Eyes:                                   No palpebral pallor or scleral icterus. ENT:                                    External auricular and nasal exam within normal limits. Mucous membranes are moist. 
Neck:                                   Supple, non-tender, no JVD. Lungs:                       Clear to auscultation bilaterally without wheezes or crackles. No respiratory distress or accessory muscle use. Heart:                                  Regular rate and rhythm, without murmurs, rubs, or gallops. Abdomen:                  Soft, non-tender, non-distended with normoactive bowel sounds. Genitourinary:           No tenderness over the bladder or bilateral CVAs. Extremities:               Without clubbing, cyanosis, or edema. Skin:                                    Normal color, texture, and turgor. No rashes, lesions, or jaundice. Pulses:                      Radial and dorsalis pedis pulses present 2+ bilaterally. Capillary refill <2s. Neurologic:                CN II-XII grossly intact and symmetrical.  
                                            Moving all four extremities well with no focal deficits. Psychiatric:                alert and oriented to place, time and person Appropriate affect. Lab/Data Review: 
 
Recent Results (from the past 24 hour(s)) CBC WITH AUTOMATED DIFF Collection Time: 11/17/18  5:39 AM  
Result Value Ref Range WBC 10.6 4.3 - 11.1 K/uL  
 RBC 2.63 (L) 4.05 - 5.2 M/uL HGB 7.0 (L) 11.7 - 15.4 g/dL HCT 23.4 (L) 35.8 - 46.3 % MCV 89.0 79.6 - 97.8 FL  
 MCH 26.6 26.1 - 32.9 PG  
 MCHC 29.9 (L) 31.4 - 35.0 g/dL  
 RDW 15.4 % PLATELET 908 817 - 981 K/uL MPV 9.9 9.4 - 12.3 FL ABSOLUTE NRBC 0.00 0.0 - 0.2 K/uL  
 DF AUTOMATED NEUTROPHILS 68 43 - 78 % LYMPHOCYTES 18 13 - 44 % MONOCYTES 13 (H) 4.0 - 12.0 % EOSINOPHILS 0 (L) 0.5 - 7.8 % BASOPHILS 0 0.0 - 2.0 % IMMATURE GRANULOCYTES 1 0.0 - 5.0 %  
 ABS. NEUTROPHILS 7.2 1.7 - 8.2 K/UL  
 ABS. LYMPHOCYTES 1.9 0.5 - 4.6 K/UL  
 ABS. MONOCYTES 1.4 (H) 0.1 - 1.3 K/UL  
 ABS. EOSINOPHILS 0.0 0.0 - 0.8 K/UL  
 ABS. BASOPHILS 0.0 0.0 - 0.2 K/UL  
 ABS. IMM. GRANS. 0.1 0.0 - 0.5 K/UL METABOLIC PANEL, BASIC Collection Time: 11/17/18  5:39 AM  
Result Value Ref Range Sodium 134 (L) 136 - 145 mmol/L Potassium 3.0 (L) 3.5 - 5.1 mmol/L Chloride 93 (L) 98 - 107 mmol/L  
 CO2 33 (H) 21 - 32 mmol/L  Anion gap 8 7 - 16 mmol/L  
 Glucose 84 65 - 100 mg/dL BUN 16 8 - 23 MG/DL Creatinine 1.17 (H) 0.6 - 1.0 MG/DL  
 GFR est AA 57 (L) >60 ml/min/1.73m2 GFR est non-AA 47 (L) >60 ml/min/1.73m2 Calcium 7.3 (L) 8.3 - 10.4 MG/DL PROTHROMBIN TIME + INR Collection Time: 11/17/18  6:33 AM  
Result Value Ref Range Prothrombin time 12.5 11.5 - 14.5 sec INR 1.0 All Micro Results Procedure Component Value Units Date/Time CULTURE, URINE [[id number]] Collected:  11/15/18 1711 Order Status:  Completed Specimen:  Cath Urine Updated:  11/17/18 8475 Special Requests: NO SPECIAL REQUESTS Culture result: NO GROWTH 1 DAY     
 CULTURE, BLOOD [[id number]] Collected:  11/15/18 1736 Order Status:  Completed Specimen:  Blood Updated:  11/17/18 6734 Special Requests: --     
  LEFT 
HAND Culture result: NO GROWTH 2 DAYS     
 CULTURE, BLOOD [[id number]] Collected:  11/17/18 0539 Order Status:  Completed Specimen:  Blood Updated:  11/17/18 3684 CULTURE, BLOOD [[id number]] Collected:  11/17/18 0539 Order Status:  Completed Specimen:  Blood Updated:  11/17/18 0636 CULTURE, BLOOD [[id number]] Collected:  11/15/18 1455 Order Status:  Completed Specimen:  Blood Updated:  11/16/18 2313 Special Requests: --     
  RIGHT Antecubital 
  
  GRAM STAIN GRAM POSITIVE RODS AEROBIC AND ANAEROBIC BOTTLES RESULTS VERIFIED, PHONED TO AND READ BACK BY SHANNA GRIDER ON 11/26/18 @1053, ADS  
     
   GRAM POSITIVE COCCI RESULTS VERIFIED, PHONED TO AND READ BACK BY CAMERON Velazco RN @ 0070 ON 11.16.18 BY DAVID  
     
  Culture result:    
  CULTURE IN PROGRESS,FURTHER UPDATES TO FOLLOW CULTURE, BLOOD [[id number]] Order Status:  Canceled Specimen:  Blood XR chest  
11- IMPRESSION:  Negative for acute change. CT head 11- IMPRESSION: 
No acute intracranial abnormality. 
  
White matter changes likely chronic small vessel ischemia in this age patient. Current Facility-Administered Medications:  
  potassium chloride (K-DUR, KLOR-CON) SR tablet 20 mEq, 20 mEq, Oral, BID, Junior Packer MD 
  vancomycin (VANCOCIN) 750 mg in  ml infusion, 750 mg, IntraVENous, Q24H, Flores, Marci C, DO, Last Rate: 250 mL/hr at 11/16/18 1717, 750 mg at 11/16/18 1717   piperacillin-tazobactam (ZOSYN) 4.5 g in 0.9% sodium chloride (MBP/ADV) 100 mL, 4.5 g, IntraVENous, Q8H, Shonna Calle MD, Last Rate: 25 mL/hr at 11/16/18 2328, 4.5 g at 11/16/18 2328 
  0.9% sodium chloride infusion, 75 mL/hr, IntraVENous, CONTINUOUS, Junior Packer MD, Last Rate: 75 mL/hr at 11/16/18 1300, 75 mL/hr at 11/16/18 1300 
  albuterol-ipratropium (DUO-NEB) 2.5 MG-0.5 MG/3 ML, 3 mL, Nebulization, Q4H PRN, Flores, Marci C, DO 
  albuterol-ipratropium (DUO-NEB) 2.5 MG-0.5 MG/3 ML, 3 mL, Nebulization, Q6H RT, Flores, Marci C, DO, 3 mL at 11/17/18 2583   aspirin delayed-release tablet 81 mg, 81 mg, Oral, DAILY, Flores, Marci C, DO, 81 mg at 11/16/18 1114   budesonide (PULMICORT) 500 mcg/2 ml nebulizer suspension, 500 mcg, Nebulization, BID RT, Flores, Marci C, DO, 500 mcg at 11/17/18 0816 
  gabapentin (NEURONTIN) capsule 100 mg, 100 mg, Oral, QHS, Flores, Marci C, DO, 100 mg at 11/16/18 2329 
  metoprolol succinate (TOPROL-XL) XL tablet 50 mg, 50 mg, Oral, DAILY, Flores, Marci C, DO, 50 mg at 11/16/18 1114 
  nicotine (NICODERM CQ) 14 mg/24 hr patch 1 Patch, 1 Patch, TransDERmal, Q24H, Jojo Flores, , 1 Patch at 11/16/18 2330   sertraline (ZOLOFT) tablet 100 mg, 100 mg, Oral, DAILY, Marci Flores DO, 100 mg at 11/16/18 1114 
  sodium chloride (NS) flush 5-10 mL, 5-10 mL, IntraVENous, Q8H, Marci Flores DO, 10 mL at 11/17/18 0619 
  sodium chloride (NS) flush 5-10 mL, 5-10 mL, IntraVENous, PRN, Marci Flores DO 
  acetaminophen (TYLENOL) tablet 650 mg, 650 mg, Oral, Q4H PRN, Fernando RANGEL DO 
   heparin (porcine) injection 5,000 Units, 5,000 Units, SubCUTAneous, Q8H, Herminio Flores Friday, DO, 5,000 Units at 11/17/18 5182   ondansetron (ZOFRAN) injection 4 mg, 4 mg, IntraVENous, Q4H PRN, Marci Flores, DO 
  hydrALAZINE (APRESOLINE) 20 mg/mL injection 20 mg, 20 mg, IntraVENous, Q6H PRN, Azul Villa,  
  VANCOMYCIN INFORMATION NOTE, , Other, Rx Dosing/Monitoring, Azul Villa DO 
  haloperidol lactate (HALDOL) injection 2 mg, 2 mg, IntraVENous, Q4H PRN, Meek Briggs MD, 2 mg at 11/16/18 9542 
 
  
 
Assessment:  
 
Principal Problem: 
  Acute metabolic encephalopathy (20/89/6705) Active Problems: 
  Essential hypertension, benign (1/2/2013) Mixed dyslipidemia (2/28/2013) S/P AVR (aortic valve replacement)-2/28/13 (2/28/2013) Sepsis ([facility] [address] (11/15/2018) YOSI (acute kidney injury) ([facility] [address] (11/15/2018) Hyponatremia (11/15/2018) Plan:  
 
Acute metabolic encephalopathy Likely combinations of factors are responsible for this including sepsis, YOSI, hyponatremia which are all improving. Improved mentation as well. Continue current empiric antibiotics, Vancomycin and Zosyn. YOSI and hyponatremia is improving. K is low, will continue to replace. Monitor. Check CBC and BMP tomorrow. Hb is lower due to rehydration. No sign of bleeding. Will watch closely. Patient is a full code. I have confirmed with . S/P AVR, not on [address] [place] [address]. Noted. I have discussed the plan of care with patient. DVT prophylaxis : heparin SC Signed By: Aaliyah Davidson MD   
 November 17, 2018

## 2018-11-17 NOTE — PROGRESS NOTES
MIDLINE Placement Note PRE-PROCEDURE VERIFICATION 
PROCEDURE DETAIL Time out completed with Estela Salazar RN and everyone in agreement with procedure. A single lumen Midline was started for vascular access and desire for reliable access. The following documentation is in addition to the Midline properties in the lines/airways flowsheet : 
Lot #: [id number] Xylocaine used: yes Mid-Arm Circumference: 23 (cm) Internal Catheter Length:8 (cm) Internal Catheter Total Length: 8 (cm) Vein Selection for Midline:left basilic Line is okay to use:

## 2018-11-17 NOTE — PROGRESS NOTES
Received bedside shift report from Shravan Guy RN. Pt lying in bed. No apparent distress. Respirations even and unlabored. Instructed to call for assistance with needs, as they arise. Pt voiced understanding.

## 2018-11-18 NOTE — PROGRESS NOTES
Received bedside shift report from [facility]. Patient in bed, in its low and locked position with call bell within reach.  Patient alert and oriented x 2 with respirations regular and non-labored. '

## 2018-11-18 NOTE — PROGRESS NOTES
Progress Note Patient: Jasbir Lee MRN: [id number]  SSN: [id number] YOB: 1940  Age: 77 y.o. 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 Admitted on 11- due to acute metabolic encephalopathy. As per , patient was doing \"OK\" until about 3 days ago when she became less alert and less responsive. It appears he does not know much about patient though. Patient has been more alert and responsive since 11-. Today, patient is sitting up in bed. On oxygen canula and looks comfortable. Alert and oriented.  is at bedside. Patient would like to go home, but  talks with me later that he would like her to go to a rehab facility. Objective:  
 
Vitals:  
 11/18/18 9045 11/18/18 1794 11/18/18 0745 11/18/18 5469 BP:  108/66 107/42 Pulse:  88 79 Resp:  20 18 Temp:  98.7 °F (37.1 °C) 97.9 °F (36.6 °C) SpO2: 99% 96% 99% 99% Weight:      
Height:      
  
 
Intake and Output: 
Current Shift: 11/18 0701 - 11/18 1900 In: -  
Out: 400 [Urine:400] Last three shifts: 11/16 1901 - 11/18 0700 In: 475 [P.O.:475] Out: 625 [Urine:625] Physical Exam:  
General:                    The patient is an elderly female in no acute distress. sitting up in bed. Talking. Asking and answering appropriately. Head:                                   Normocephalic/atraumatic. Eyes:                                   No palpebral pallor or scleral icterus. ENT:                                    External auricular and nasal exam within normal limits. Mucous membranes are moist. 
Neck:                                   Supple, non-tender, no JVD. Lungs:                       Clear to auscultation bilaterally without wheezes or crackles. No respiratory distress or accessory muscle use. Heart:                                  Regular rate and rhythm, without murmurs, rubs, or gallops. Abdomen:                  Soft, non-tender, non-distended with normoactive bowel sounds. Genitourinary:           No tenderness over the bladder or bilateral CVAs. Extremities:               Without clubbing, cyanosis, or edema. Skin:                                    Normal color, texture, and turgor. No rashes, lesions, or jaundice. Pulses:                      Radial and dorsalis pedis pulses present 2+ bilaterally. Capillary refill <2s. Neurologic:                CN II-XII grossly intact and symmetrical.  
                                            Moving all four extremities well with no focal deficits. Psychiatric:                alert and oriented to place, time and person Appropriate affect. Lab/Data Review: 
 
Recent Results (from the past 24 hour(s)) Catrachita Neas Collection Time: 11/17/18  4:10 PM  
Result Value Ref Range Vancomycin,trough 14.3 5 - 20 ug/mL METABOLIC PANEL, BASIC Collection Time: 11/18/18  7:26 AM  
Result Value Ref Range Sodium 140 136 - 145 mmol/L Potassium 3.4 (L) 3.5 - 5.1 mmol/L Chloride 104 98 - 107 mmol/L  
 CO2 32 21 - 32 mmol/L Anion gap 4 (L) 7 - 16 mmol/L Glucose 87 65 - 100 mg/dL BUN 17 8 - 23 MG/DL Creatinine 1.09 (H) 0.6 - 1.0 MG/DL  
 GFR est AA >60 >60 ml/min/1.73m2 GFR est non-AA 51 (L) >60 ml/min/1.73m2 Calcium 7.3 (L) 8.3 - 10.4 MG/DL  
CBC WITH AUTOMATED DIFF Collection Time: 11/18/18  7:26 AM  
Result Value Ref Range WBC 10.0 4.3 - 11.1 K/uL  
 RBC 2.31 (L) 4.05 - 5.2 M/uL HGB 6.2 (LL) 11.7 - 15.4 g/dL HCT 21.1 (L) 35.8 - 46.3 % MCV 91.3 79.6 - 97.8 FL  
 MCH 26.8 26.1 - 32.9 PG  
 MCHC 29.4 (L) 31.4 - 35.0 g/dL  
 RDW 15.8 % PLATELET 309 677 - 083 K/uL MPV 9.7 9.4 - 12.3 FL ABSOLUTE NRBC 0.00 0.0 - 0.2 K/uL  
 DF AUTOMATED NEUTROPHILS 68 43 - 78 % LYMPHOCYTES 17 13 - 44 % MONOCYTES 13 (H) 4.0 - 12.0 % EOSINOPHILS 1 0.5 - 7.8 % BASOPHILS 0 0.0 - 2.0 % IMMATURE GRANULOCYTES 1 0.0 - 5.0 %  
 ABS. NEUTROPHILS 6.8 1.7 - 8.2 K/UL  
 ABS. LYMPHOCYTES 1.7 0.5 - 4.6 K/UL  
 ABS. MONOCYTES 1.3 0.1 - 1.3 K/UL  
 ABS. EOSINOPHILS 0.1 0.0 - 0.8 K/UL  
 ABS. BASOPHILS 0.0 0.0 - 0.2 K/UL  
 ABS. IMM. GRANS. 0.1 0.0 - 0.5 K/UL  
TYPE + CROSSMATCH Collection Time: 11/18/18  9:50 AM  
Result Value Ref Range Crossmatch Expiration 11/21/2018 ABO/Rh(D) O POSITIVE Antibody screen NEG Unit number [id number] Blood component type [facility] Unit division 00 Status of unit ALLOCATED Crossmatch result Compatible All Micro Results Procedure Component Value Units Date/Time CULTURE, BLOOD [[id number]]  (Abnormal) Collected:  11/15/18 9681 Order Status:  Completed Specimen:  Blood Updated:  11/18/18 1030 Special Requests: --     
  RIGHT Antecubital 
  
  GRAM STAIN GRAM POSITIVE RODS AEROBIC AND ANAEROBIC BOTTLES RESULTS VERIFIED, PHONED TO AND READ BACK BY SHANNA GRIDER ON 11/26/18 @1053, ADS  
     
   GRAM POSITIVE COCCI RESULTS VERIFIED, PHONED TO AND READ BACK BY CAMERON BURRIS RN @ 2989 ON 11.16.18 BY KJ  
     
  Culture result: STAPHYLOCOCCUS SPECIES, COAGULASE NEGATIVE AEROBIC AND ANAEROBIC BOTTLES  
     
      
  THIS ORGANISM MAY BE INDICATIVE OF CULTURE CONTAMINATION, HOWEVER, CLINICAL CORRELATION NEEDS TO BE EVALUATED, AS EACH CASE IS UNIQUE. CULTURE IN PROGRESS,FURTHER UPDATES TO FOLLOW CULTURE, URINE [[id number]] Collected:  11/15/18 1711 Order Status:  Completed Specimen:  Cath Urine Updated:  11/18/18 6261 Special Requests: NO SPECIAL REQUESTS Culture result: NO GROWTH 2 DAYS CULTURE, BLOOD [[id number]] Collected:  11/17/18 0539 Order Status:  Completed Specimen:  Blood Updated:  11/18/18 1633 Special Requests: --     
  RIGHT 
ARM Culture result: NO GROWTH 1 DAY     
 CULTURE, BLOOD [[id number]] Collected:  11/17/18 0539 Order Status:  Completed Specimen:  Blood Updated:  11/18/18 9021 Special Requests: --     
  RIGHT 
HAND Culture result: NO GROWTH 1 DAY     
 CULTURE, BLOOD [[id number]] Collected:  11/15/18 1736 Order Status:  Completed Specimen:  Blood Updated:  11/17/18 1847 Special Requests: --     
  LEFT 
HAND 
  
  GRAM STAIN GRAM POSITIVE RODS AEROBIC BOTTLE POSITIVE RESULTS VERIFIED, PHONED TO AND READ BACK BY EMILY SAN RN BY [address] AT [address] [place] [address] Culture result:    
  CULTURE IN PROGRESS,FURTHER UPDATES TO FOLLOW CULTURE, BLOOD [[id number]] Order Status:  Canceled Specimen:  Blood XR chest  
11- IMPRESSION:  Negative for acute change. CT head 11- IMPRESSION: 
No acute intracranial abnormality. 
  
White matter changes likely chronic small vessel ischemia in this age patient. Current Facility-Administered Medications:  
  potassium chloride (K-DUR, KLOR-CON) SR tablet 20 mEq, 20 mEq, Oral, BID, Junior Packer MD, 20 mEq at 11/18/18 1011 
  0.9% sodium chloride infusion 250 mL, 250 mL, IntraVENous, PRN, Junior Packer MD 
  sodium chloride (NS) flush 10 mL, 10 mL, InterCATHeter, Q8H, Junior Packer MD, 10 mL at 11/18/18 5467 
  sodium chloride (NS) flush 10 mL, 10 mL, InterCATHeter, PRN, Junior Packer MD 
  influenza vaccine 2018-19 (6 mos+)(PF) (FLUARIX QUAD/FLULAVAL QUAD) injection 0.5 mL, 0.5 mL, IntraMUSCular, PRIOR TO DISCHARGE, Marci Flores DO 
  vancomycin (VANCOCIN) 750 mg in  ml infusion, 750 mg, IntraVENous, Q24H, Marci Flores DO, Last Rate: 250 mL/hr at 11/17/18 1813, 750 mg at 11/17/18 1813   piperacillin-tazobactam (ZOSYN) 4.5 g in 0.9% sodium chloride (MBP/ADV) 100 mL, 4.5 g, IntraVENous, Q8H, Willian Jaimes MD, Last Rate: 25 mL/hr at 11/18/18 0640, 4.5 g at 11/18/18 0640 
  0.9% sodium chloride infusion, 75 mL/hr, IntraVENous, CONTINUOUS, Junior Packer MD, Last Rate: 75 mL/hr at 11/17/18 2234, 75 mL/hr at 11/17/18 2234   albuterol-ipratropium (DUO-NEB) 2.5 MG-0.5 MG/3 ML, 3 mL, Nebulization, Q4H PRN, Flores Marci C, DO 
  albuterol-ipratropium (DUO-NEB) 2.5 MG-0.5 MG/3 ML, 3 mL, Nebulization, Q6H RT, Flores, Marci C, DO, 3 mL at 11/18/18 7199   aspirin delayed-release tablet 81 mg, 81 mg, Oral, DAILY, Flores, Marci C, DO, 81 mg at 11/18/18 1010   budesonide (PULMICORT) 500 mcg/2 ml nebulizer suspension, 500 mcg, Nebulization, BID RT, Flores Marci C, DO, 500 mcg at 11/18/18 0808 
  gabapentin (NEURONTIN) capsule 100 mg, 100 mg, Oral, QHS, Flores, Marci C, DO, 100 mg at 11/17/18 2225   metoprolol succinate (TOPROL-XL) XL tablet 50 mg, 50 mg, Oral, DAILY, Flores, Marci C, DO, 50 mg at 11/18/18 1010 
  nicotine (NICODERM CQ) 14 mg/24 hr patch 1 Patch, 1 Patch, TransDERmal, Q24H, Sophia Flores DO, 1 Patch at 11/17/18 2224   sertraline (ZOLOFT) tablet 100 mg, 100 mg, Oral, DAILY, Flores, Marci C, DO, 100 mg at 11/18/18 1010 
  sodium chloride (NS) flush 5-10 mL, 5-10 mL, IntraVENous, Q8H, Flores, Marci C, DO, 5 mL at 11/17/18 2224   sodium chloride (NS) flush 5-10 mL, 5-10 mL, IntraVENous, PRN, Marci Flores, DO 
  acetaminophen (TYLENOL) tablet 650 mg, 650 mg, Oral, Q4H PRN, Marci Flores, DO 
  heparin (porcine) injection 5,000 Units, 5,000 Units, SubCUTAneous, Q8H, Cindy Valenzuela DO, 5,000 Units at 11/18/18 4034   ondansetron (ZOFRAN) injection 4 mg, 4 mg, IntraVENous, Q4H PRN, Marci Flores, DO 
  hydrALAZINE (APRESOLINE) 20 mg/mL injection 20 mg, 20 mg, IntraVENous, Q6H PRN, Linden RANGEL, DO 
   haloperidol lactate (HALDOL) injection 2 mg, 2 mg, IntraVENous, Q4H PRN, Bertha Marcelo MD, 2 mg at 11/16/18 6256 
 
  
 
Assessment:  
 
Principal Problem: 
  Acute metabolic encephalopathy (83/88/6849) Active Problems: 
  Essential hypertension, benign (1/2/2013) Mixed dyslipidemia (2/28/2013) S/P AVR (aortic valve replacement)-2/28/13 (2/28/2013) Sepsis ([facility] [address] (11/15/2018) YOSI (acute kidney injury) ([facility] [address] (11/15/2018) Hyponatremia (11/15/2018) Plan:  
 
Acute metabolic encephalopathy Likely combinations of factors are responsible for this including sepsis, YOSI, hyponatremia which are all improving. Resolved confusion and AMS. Continue current empiric antibiotics, Vancomycin and Zosyn. YOSI and hyponatremia is improving. K is low, will continue to replace. Monitor. Check CBC and BMP tomorrow. Hb is lower. No sign of bleeding. Patient must have had baseline anemia and would benefit from transfusion to help with strength. Will give 1 unit of PRC transfusion. Work-ups for anemia could be done as an out-patient. Patient is a full code. I have confirmed with . S/P AVR, not on [address]. Noted. I have discussed the plan of care with patient. DVT prophylaxis : heparin SC Disposition plan : I advised  to talk with CM for plan. Signed By: Bryan Perez MD   
 November 18, 2018

## 2018-11-18 NOTE — PROGRESS NOTES
Received bedside shift report from Megan Abebe RN. Pt lying in bed. No apparent distress. Respirations even and unlabored. Instructed to call for assistance with needs, as they arise. Pt voiced understanding.

## 2018-11-19 NOTE — PROGRESS NOTES
Problem: Interdisciplinary Rounds Goal: Interdisciplinary Rounds Interdisciplinary team rounds were held 11/19/2018 with the following team members:Care Management, Physical Therapy, Physician and Clinical Coordinator and the patient. Plan of care discussed. See clinical pathway and/or care plan for interventions and desired outcomes.

## 2018-11-19 NOTE — PROGRESS NOTES
Pt continues to have SOB rr 28 lung sounds course with crackles noted. Fluid stopped. Pt given lasix 40mg. She states she feels better but clinically still presents with SOB. O2 in place 6L NC. Safety measures in place will continue to monitor.

## 2018-11-19 NOTE — HOSPICE
[facility]- 
 
I Just spoke with pt's  on the phone and he states that he will be at the hospital around 1700 today and plans to spend the night. Meeting set up for 0900 in the am to discuss hospice services/support. Thank you- Lata Smith RN 
[facility] liaison [phone]

## 2018-11-19 NOTE — PROGRESS NOTES
Progress Note Patient: Trisha Bean MRN: [id number]  SSN: [id number] YOB: 1940  Age: 77 y.o. 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 Admitted on 11- due to acute metabolic encephalopathy. As per , patient was doing \"OK\" until about 3 days ago when she became less alert and less responsive. It appears he does not know much about patient though. Patient has been more alert and responsive since 11-. Today, patient is sitting up in bed. On oxygen canula and feels better. No chest pain. No fever. She received 1 unit of [employer] on 11- without problems. Objective:  
 
Vitals:  
 11/19/18 0139 11/19/18 0421 11/19/18 6092 11/19/18 0043 BP:  128/64 163/76 Pulse:  82 85 Resp:  20 18 Temp:  98 °F (36.7 °C) 98.6 °F (37 °C) SpO2: 100% 98% 98% 97% Weight:      
Height:      
  
 
Intake and Output: 
Current Shift: 11/19 0701 - 11/19 1900 In: 120 [P.O.:120] Out: - Last three shifts: 11/17 1901 - 11/19 0700 In: 80 [P.O.:240] Out: 975 [Urine:975] Physical Exam:  
General:                    The patient is an elderly female in no acute distress. sitting up in bed. Talking. Asking and answering appropriately. Head:                                   Normocephalic/atraumatic. Eyes:                                   No palpebral pallor or scleral icterus. ENT:                                    External auricular and nasal exam within normal limits. Mucous membranes are moist. 
Neck:                                   Supple, non-tender, no JVD. Lungs:                       Clear to auscultation bilaterally without wheezes or crackles. No respiratory distress or accessory muscle use. Heart:                                  Regular rate and rhythm, without murmurs, rubs, or gallops. Abdomen:                  Soft, non-tender, non-distended with normoactive bowel sounds. Genitourinary:           No tenderness over the bladder or bilateral CVAs. Extremities:               Without clubbing, cyanosis, or edema. Skin:                                    Normal color, texture, and turgor. No rashes, lesions, or jaundice. Pulses:                      Radial and dorsalis pedis pulses present 2+ bilaterally. Capillary refill <2s. Neurologic:                CN II-XII grossly intact and symmetrical.  
                                            Moving all four extremities well with no focal deficits. Psychiatric:                alert and oriented to place, time and person Appropriate affect. Lab/Data Review: 
 
Recent Results (from the past 24 hour(s)) METABOLIC PANEL, BASIC Collection Time: 11/19/18  5:50 AM  
Result Value Ref Range Sodium 142 136 - 145 mmol/L Potassium 4.3 3.5 - 5.1 mmol/L Chloride 108 (H) 98 - 107 mmol/L  
 CO2 29 21 - 32 mmol/L Anion gap 5 (L) 7 - 16 mmol/L Glucose 75 65 - 100 mg/dL BUN 12 8 - 23 MG/DL Creatinine 0.85 0.6 - 1.0 MG/DL  
 GFR est AA >60 >60 ml/min/1.73m2 GFR est non-AA >60 >60 ml/min/1.73m2 Calcium 7.4 (L) 8.3 - 10.4 MG/DL  
CBC WITH AUTOMATED DIFF Collection Time: 11/19/18  5:50 AM  
Result Value Ref Range WBC 10.7 4.3 - 11.1 K/uL  
 RBC 2.86 (L) 4.05 - 5.2 M/uL HGB 7.9 (L) 11.7 - 15.4 g/dL HCT 26.8 (L) 35.8 - 46.3 % MCV 93.7 79.6 - 97.8 FL  
 MCH 27.6 26.1 - 32.9 PG  
 MCHC 29.5 (L) 31.4 - 35.0 g/dL  
 RDW 16.0 % PLATELET 548 163 - 308 K/uL MPV 9.9 9.4 - 12.3 FL ABSOLUTE NRBC 0.00 0.0 - 0.2 K/uL  
 DF AUTOMATED NEUTROPHILS 66 43 - 78 % LYMPHOCYTES 18 13 - 44 % MONOCYTES 14 (H) 4.0 - 12.0 % EOSINOPHILS 1 0.5 - 7.8 %  BASOPHILS 0 0.0 - 2.0 %  
 IMMATURE GRANULOCYTES 1 0.0 - 5.0 %  
 ABS. NEUTROPHILS 7.0 1.7 - 8.2 K/UL  
 ABS. LYMPHOCYTES 1.9 0.5 - 4.6 K/UL  
 ABS. MONOCYTES 1.5 (H) 0.1 - 1.3 K/UL  
 ABS. EOSINOPHILS 0.1 0.0 - 0.8 K/UL  
 ABS. BASOPHILS 0.0 0.0 - 0.2 K/UL  
 ABS. IMM. GRANS. 0.1 0.0 - 0.5 K/UL All Micro Results Procedure Component Value Units Date/Time CULTURE, BLOOD [[id number]] Collected:  11/17/18 0539 Order Status:  Completed Specimen:  Blood Updated:  11/19/18 2957 Special Requests: --     
  RIGHT 
HAND Culture result: NO GROWTH 2 DAYS     
 CULTURE, BLOOD [[id number]] Collected:  11/17/18 0539 Order Status:  Completed Specimen:  Blood Updated:  11/19/18 8628 Special Requests: --     
  RIGHT 
ARM Culture result: NO GROWTH 2 DAYS     
 CULTURE, BLOOD [[id number]]  (Abnormal) Collected:  11/15/18 1455 Order Status:  Completed Specimen:  Blood Updated:  11/18/18 1030 Special Requests: --     
  RIGHT Antecubital 
  
  GRAM STAIN GRAM POSITIVE RODS AEROBIC AND ANAEROBIC BOTTLES RESULTS VERIFIED, PHONED TO AND READ BACK BY SHANNA GRIDER ON 11/26/18 @1053, ADS  
     
   GRAM POSITIVE COCCI RESULTS VERIFIED, PHONED TO AND READ BACK BY CAMERON BURRIS RN @ 7733 ON 11.16.18 BY KJ  
     
  Culture result: STAPHYLOCOCCUS SPECIES, COAGULASE NEGATIVE AEROBIC AND ANAEROBIC BOTTLES  
     
      
  THIS ORGANISM MAY BE INDICATIVE OF CULTURE CONTAMINATION, HOWEVER, CLINICAL CORRELATION NEEDS TO BE EVALUATED, AS EACH CASE IS UNIQUE. CULTURE IN PROGRESS,FURTHER UPDATES TO FOLLOW CULTURE, URINE [[id number]] Collected:  11/15/18 1711 Order Status:  Completed Specimen:  Cath Urine Updated:  11/18/18 0055 Special Requests: NO SPECIAL REQUESTS Culture result: NO GROWTH 2 DAYS     
 CULTURE, BLOOD [[id number]] Collected:  11/15/18 1736 Order Status:  Completed Specimen:  Blood Updated:  11/17/18 1847   Special Requests: --     
 LEFT 
HAND 
  
  GRAM STAIN GRAM POSITIVE RODS AEROBIC BOTTLE POSITIVE RESULTS VERIFIED, PHONED TO AND READ BACK BY EMILY SAN RN BY [place] [id number] [facility] AT [address] [place] [address] [id number] Culture result:    
  CULTURE IN PROGRESS,FURTHER UPDATES TO FOLLOW CULTURE, BLOOD [[id number]] Order Status:  Canceled Specimen:  Blood XR chest  
11- IMPRESSION:  Negative for acute change. CT head 11- IMPRESSION: 
No acute intracranial abnormality. 
  
White matter changes likely chronic small vessel ischemia in this age patient. Current Facility-Administered Medications:  
  0.9% sodium chloride infusion 250 mL, 250 mL, IntraVENous, PRN, Junior Packer MD 
  sodium chloride (NS) flush 10 mL, 10 mL, InterCATHeter, Q8H, Junior Packer MD, 10 mL at 11/19/18 0527 
  sodium chloride (NS) flush 10 mL, 10 mL, InterCATHeter, PRN, Junior Packer MD 
  influenza vaccine 2018-19 (6 mos+)(PF) (FLUARIX QUAD/FLULAVAL QUAD) injection 0.5 mL, 0.5 mL, IntraMUSCular, PRIOR TO DISCHARGE, Marci Flores DO 
  vancomycin (VANCOCIN) 750 mg in  ml infusion, 750 mg, IntraVENous, Q24H, Marci Flores DO, Last Rate: 250 mL/hr at 11/18/18 1801, 750 mg at 11/18/18 1801   piperacillin-tazobactam (ZOSYN) 4.5 g in 0.9% sodium chloride (MBP/ADV) 100 mL, 4.5 g, IntraVENous, Q8H, Sampson Riedel, MD, Last Rate: 25 mL/hr at 11/19/18 0520, 4.5 g at 11/19/18 0520 
  0.9% sodium chloride infusion, 75 mL/hr, IntraVENous, CONTINUOUS, Junior Packer MD, Last Rate: 75 mL/hr at 11/19/18 0522, 75 mL/hr at 11/19/18 0522   albuterol-ipratropium (DUO-NEB) 2.5 MG-0.5 MG/3 ML, 3 mL, Nebulization, Q4H PRN, Flores, Marci C, DO 
  albuterol-ipratropium (DUO-NEB) 2.5 MG-0.5 MG/3 ML, 3 mL, Nebulization, Q6H RT, Marci Flores, , 3 mL at 11/19/18 6309   aspirin delayed-release tablet 81 mg, 81 mg, Oral, DAILY, Tomasa Mathis DO, 81 mg at 11/19/18 2257   budesonide (PULMICORT) 500 mcg/2 ml nebulizer suspension, 500 mcg, Nebulization, BID RT, Flores, Marci C, DO, 500 mcg at 11/19/18 0755 
  gabapentin (NEURONTIN) capsule 100 mg, 100 mg, Oral, QHS, Flores, Marci C, DO, 100 mg at 11/18/18 2110 
  metoprolol succinate (TOPROL-XL) XL tablet 50 mg, 50 mg, Oral, DAILY, Flores, Marci C, DO, 50 mg at 11/19/18 0939 
  nicotine (NICODERM CQ) 14 mg/24 hr patch 1 Patch, 1 Patch, TransDERmal, Q24H, Flores, [place] Joni, DO, 1 Patch at 11/18/18 2110   sertraline (ZOLOFT) tablet 100 mg, 100 mg, Oral, DAILY, Flores, Marci C, DO, 100 mg at 11/19/18 0939 
  sodium chloride (NS) flush 5-10 mL, 5-10 mL, IntraVENous, Q8H, Flores, Marci C, DO, 5 mL at 11/19/18 9308   sodium chloride (NS) flush 5-10 mL, 5-10 mL, IntraVENous, PRN, Flores, Marci C, DO 
  acetaminophen (TYLENOL) tablet 650 mg, 650 mg, Oral, Q4H PRN, Flores, Marci C, DO 
  heparin (porcine) injection 5,000 Units, 5,000 Units, SubCUTAneous, Q8H, Flores, Marci C, DO, 5,000 Units at 11/19/18 0520 
  ondansetron (ZOFRAN) injection 4 mg, 4 mg, IntraVENous, Q4H PRN, Flores, Marci C, DO 
  hydrALAZINE (APRESOLINE) 20 mg/mL injection 20 mg, 20 mg, IntraVENous, Q6H PRN, Flores, Marci C, DO 
  haloperidol lactate (HALDOL) injection 2 mg, 2 mg, IntraVENous, Q4H PRN, Edie Molina MD, 2 mg at 11/16/18 5252 
 
  
 
Assessment:  
 
Principal Problem: 
  Acute metabolic encephalopathy (28/35/4987) Active Problems: 
  Essential hypertension, benign (1/2/2013) Mixed dyslipidemia (2/28/2013) S/P AVR (aortic valve replacement)-2/28/13 (2/28/2013) Sepsis ([facility] [address] (11/15/2018) YOSI (acute kidney injury) ([facility] (11/15/2018) Hyponatremia (11/15/2018) Plan:  
 
Acute metabolic encephalopathy Likely combinations of factors are responsible for this including sepsis, YOSI, hyponatremia which are all improving. Resolved confusion and AMS. Mentation is back to baseline. Continue current empiric antibiotics, Vancomycin and Zosyn. Definite blood culture result is still pending. YOSI and hyponatremia is improving. K is normalized after repletion. Monitor. Anemia Chronic prior to admission. No signs and symptoms of active bleeding. likely need work-ups as an out-patient. Patient is a full code. I have confirmed with .  is HCPOA. S/P AVR, not on OAC due to anemia, questions regarding mobility and prone to fall. Noted. I have discussed the plan of care with patient. DVT prophylaxis : heparin SC Disposition plan : likely for placement or rehab referral. CM to help. Signed By: Dano Vázquez MD   
 November 19, 2018

## 2018-11-19 NOTE — PROGRESS NOTES
Problem: Breathing Pattern - Ineffective Goal: *Absence of hypoxia Outcome: Progressing Towards Goal 
Patients O2 saturation doing well although patient not breathing as efficiently. BS are diminished.

## 2018-11-19 NOTE — PROGRESS NOTES
Pt is in room alert and oriented. Pt rr 25 crackles noted to brady lungs with diminished lung sounds to left lung. O2 in place 4L NC.MD is aware. abd is soft bowel sounds is active. No increased bleeding or bruising noted. IV fluids infusing. Chan in place and patent. Safety measures in place will continue to monitor.

## 2018-11-19 NOTE — PROGRESS NOTES
Bedside report received from Jessica Granados, [facility]. Assessment completed. Bed is in low and locked position with floor free of objects. Patient AxOx3, and resting quietly in bed. Pt asking for something to help her sleep called Dr. Camilo Miranda no new orders received. Respirations even and non labored. Verbalized understanding to call for needs. Call light within reach. Will continue to monitor pt.

## 2018-11-19 NOTE — PROGRESS NOTES
Pt is resting well with no needs at this time. Pt respirations are even and unlabored. Vital signs are stable. Will continue to monitor pt.

## 2018-11-19 NOTE — PROGRESS NOTES
Problem: Falls - Risk of 
Goal: *Absence of Falls Document Yumiko Lieberman Fall Risk and appropriate interventions in the flowsheet. Outcome: Progressing Towards Goal 
Fall Risk Interventions: 
Mobility Interventions: Bed/chair exit alarm Mentation Interventions: Bed/chair exit alarm Medication Interventions: Bed/chair exit alarm Elimination Interventions: Call light in reach History of Falls Interventions: Bed/chair exit alarm

## 2018-11-20 NOTE — PROGRESS NOTES
Bedside report given to oncoming nurse, opportunity for questions given. Pt resting quietly in bed no needs noted at present family at bedside. Respirations are even and non labored.

## 2018-11-20 NOTE — PROGRESS NOTES
Bedside report received from LATRICIA Ohara. Assessment completed. Bed is in low and locked position with floor free of objects. Patient AxOx3, and resting quietly in bed. No needs noted at present. Respirations even and non labored. O2 @ 3 LPM  Verbalized understanding to call for needs. Family at bedside. Call light within reach. Will continue to monitor pt.

## 2018-11-20 NOTE — PROGRESS NOTES
Progress Note Patient: Van Gaxiola MRN: [id number]  SSN: [id number] YOB: 1940  Age: 77 y.o. Sex: female Admit Date: 11/15/2018 LOS: 5 days Subjective: F/U COPD end stages. Anemia and hyponatremia. Waiting on rehab placement. Back to home NC of 3L NC. Repeat blood cultures are negative for three days. No chest pain or SOB. Current Facility-Administered Medications Medication Dose Route Frequency  0.9% sodium chloride infusion 250 mL  250 mL IntraVENous PRN  
 sodium chloride (NS) flush 10 mL  10 mL InterCATHeter Q8H  
 sodium chloride (NS) flush 10 mL  10 mL InterCATHeter PRN  
 influenza vaccine 2018-19 (6 mos+)(PF) (FLUARIX QUAD/FLULAVAL QUAD) injection 0.5 mL  0.5 mL IntraMUSCular PRIOR TO DISCHARGE  piperacillin-tazobactam (ZOSYN) 4.5 g in 0.9% sodium chloride (MBP/ADV) 100 mL  4.5 g IntraVENous Q8H  
 albuterol-ipratropium (DUO-NEB) 2.5 MG-0.5 MG/3 ML  3 mL Nebulization Q4H PRN  
 albuterol-ipratropium (DUO-NEB) 2.5 MG-0.5 MG/3 ML  3 mL Nebulization Q6H RT  
 aspirin delayed-release tablet 81 mg  81 mg Oral DAILY  budesonide (PULMICORT) 500 mcg/2 ml nebulizer suspension  500 mcg Nebulization BID RT  
 gabapentin (NEURONTIN) capsule 100 mg  100 mg Oral QHS  metoprolol succinate (TOPROL-XL) XL tablet 50 mg  50 mg Oral DAILY  nicotine (NICODERM CQ) 14 mg/24 hr patch 1 Patch  1 Patch TransDERmal Q24H  sertraline (ZOLOFT) tablet 100 mg  100 mg Oral DAILY  acetaminophen (TYLENOL) tablet 650 mg  650 mg Oral Q4H PRN  
 heparin (porcine) injection 5,000 Units  5,000 Units SubCUTAneous Q8H  
 ondansetron (ZOFRAN) injection 4 mg  4 mg IntraVENous Q4H PRN  
 hydrALAZINE (APRESOLINE) 20 mg/mL injection 20 mg  20 mg IntraVENous Q6H PRN  
 haloperidol lactate (HALDOL) injection 2 mg  2 mg IntraVENous Q4H PRN Objective:  
 
Vitals:  
 11/20/18 0838 11/20/18 1218 11/20/18 1411 11/20/18 1617 BP:  154/57  152/59 Pulse:  70  87 Resp:  20 21 Temp:  97.4 °F (36.3 °C)  97.8 °F (36.6 °C) SpO2: 94% 98% 96% 90% Height:      
  
  
Intake and Output: 
Current Shift: 11/20 0701 - 11/20 1900 In: 480 [P.O.:480] Out: - Last three shifts: 11/18 1901 - 11/20 0700 In: 600 [P.O.:600] Out: 2100 [Urine:2100] Physical Exam:  
General:  Alert, cooperative, no distress, appears stated age. Eyes:  Conjunctivae/corneas clear. Ears:  Normal TMs and external ear canals both ears. Nose: Nares normal. Septum midline. Mouth/Throat: Lips, mucosa, and tongue normal. Teeth and gums normal.  
Neck:  no JVD. Back:   Symmetric, no curvature. ROM normal. No CVA tenderness. Lungs:   Clear to auscultation bilaterally. Heart:  Regular rate and rhythm, S1, S2 normal, no murmur, click, rub or gallop. Abdomen:   Soft, non-tender. Bowel sounds normal. No masses,  No organomegaly. Extremities: Extremities normal, atraumatic, no cyanosis or edema. Pulses: 2+ and symmetric all extremities. Skin: Skin color, texture, turgor normal. No rashes or lesions Lymph nodes: Cervical, supraclavicular, and axillary nodes normal.  
Neurologic: CNII-XII intact. Lab/Data Review: No results found for this or any previous visit (from the past 24 hour(s)). Assessment/ Plan:  
 
Principal Problem: 
  Acute metabolic encephalopathy (10/86/2719) Active Problems: 
  Essential hypertension, benign (1/2/2013) Mixed dyslipidemia (2/28/2013) S/P AVR (aortic valve replacement)-2/28/13 (2/28/2013) Sepsis ([facility] [address] (11/15/2018) YOSI (acute kidney injury) ([facility] [address] (11/15/2018) Hyponatremia (11/15/2018) AMS resolved. Likely from sepsis of unknown etiology, YOSI, and hyponatremia. Mentation appears to be back to baseline. Hyponatremia improved with good oral intake Anemia stable. S/p PRBC 1 unit. For her COPD, continue duoneb q6hrs and pulmicort BID. DC vancomycin since repeat blood cultures are negative. Will also dc zosyn since has had 6 day treatment, no known source of infection, and has remained afebrile. DC bala. Looking for rehab placement. Potential discharge tomorrow. DVT prophylaxis - heparin Signed By: Clinton Carnes DO November 20, 2018

## 2018-11-20 NOTE — PROGRESS NOTES
Problem: Mobility Impaired (Adult and Pediatric) Goal: *Acute Goals and Plan of Care (Insert Text) STG: 
(1.)Ms. Radha Peacock will move from supine to sit and sit to supine  with CONTACT GUARD ASSIST within 3 treatment day(s). (2.)Ms. Radha Peacock will transfer from bed to chair and chair to bed with STAND BY ASSIST using the least restrictive device within 3 treatment day(s). (3.)Ms. Radha Peacock will ambulate with CONTACT GUARD ASSIST for 50 feet with the least restrictive device within 3 treatment day(s). LTG: 
(1.)Ms. Radha Peacock will move from supine to sit and sit to supine  in bed with STAND BY ASSIST within 7 treatment day(s). (2.)Ms. Radha Peacock will transfer from bed to chair and chair to bed with SUPERVISION using the least restrictive device within 7 treatment day(s). (3.)Ms. Radha Peacock will ambulate with STAND BY ASSIST for 200+ feet with the least restrictive device within 7 treatment day(s). (4.)Ms. Sylvia Don will ascend/descend 3 steps with use of railing with STAND BY ASSIST within 7 treatment days. ________________________________________________________________________________________________ PHYSICAL THERAPY: Initial Assessment, Treatment Day: Day of Assessment, PM 11/20/2018INPATIENT: Hospital Day: 6 [place] / Product Type: Medicare /  
  
NAME/AGE/GENDER: Heena Lindsay is a 77 y.o. female PRIMARY DIAGNOSIS: Sepsis ([facility] [address] Acute metabolic encephalopathy Acute metabolic encephalopathy ICD-10: Treatment Diagnosis:  
 · Generalized Muscle Weakness (M62.81) · Difficulty in walking, Not elsewhere classified (R26.2) · History of falling (Z91.81) Precaution/Allergies: 
Demerol [meperidine]; Gabapentin; Lortab [hydrocodone-acetaminophen]; and Statins-hmg-coa reductase inhibitors ASSESSMENT:  
Ms. Radha Peacock is supine in bed upon contact and agreeable to PT evaluation and treatment this afternoon.  Pt is oriented to person only this session and reports 0/10 pain prior to mobility. Pt lives with her  in 2 story home with all needs on 1st floor with 2-3 steps to enter with railing available. Pt is typically independent with gait and ADLs with reports of 1 fall. Pt also driving at baseline. Pt currently on 2.5 L O2 NC which is her baseline. Pt transitioned supine to sit EOB with Gary and cues for technique. Pt demonstrating good static sitting balance. Pt performed STS to RW with Gary-modA demonstrating fair static standing balance. Pt ambulated 5 ft to bedside chair with RW and CGA-Gary. Pt returned to sitting with reports of increased SOB. Pt with O2 sat at 95% following activity. After short rest break, pt performed exercises in chair requiring VC and TC for technique. Pt tolerated treatment well and left sitting up with all needs met and within reach. Hortensia Quintanilla will benefit from skilled PT (medically necessary) to address decreased strength, decreased balance, decreased functional tolerance, decreased cardiopulmonary endurance affecting participation in basic ADLs and functional tasks. This section established at most recent assessment PROBLEM LIST (Impairments causing functional limitations): 1. Decreased Strength 2. Decreased ADL/Functional Activities 3. Decreased Transfer Abilities 4. Decreased Ambulation Ability/Technique 5. Decreased Balance 6. Decreased Activity Tolerance 7. Decreased Pacing Skills 8. Increased Fatigue 9. Increased Shortness of Breath 10. Decreased [place] with Home Exercise Program 
11. Decreased Cognition INTERVENTIONS PLANNED: (Benefits and precautions of physical therapy have been discussed with the patient.) 1. Balance Exercise 2. Bed Mobility 3. Family Education 4. Gait Training 5. Home Exercise Program (HEP) 6. Neuromuscular Re-education/Strengthening 7. Range of Motion (ROM) 8. Therapeutic Activites 9. Therapeutic Exercise/Strengthening 10. Transfer Training TREATMENT PLAN: Frequency/Duration: 3 times a week for duration of hospital stay Rehabilitation Potential For Stated Goals: Good RECOMMENDED REHABILITATION/EQUIPMENT: (at time of discharge pending progress): Due to the probability of continued deficits (see above) this patient will likely need continued skilled physical therapy after discharge. Equipment:  
? None at this time HISTORY:  
History of Present Injury/Illness (Reason for Referral): 
See H&P below 78F with pmhx of CAD, s/p AVR, end stage COPD usually on 3lnc but increased to [id number] in last one week presents with AMS. Pt lives at home with her . History taken from him due to her state, however, he is a horrible historian. He does report that patient is typically able to walk independently, cooks and dresses by herself. When asked about bathing he states \"she won't do it. I don't remember the last time she cleaned up\". Reports patient was her usual self until three days ago when she last her ability to ambulate on her own, requiring his assistance to walk short distances. Then yesterday started getting confused and since this morning fell out of bed three times requiring assistance to get back in bed. No report of fever, chills, nausea, vomiting, diarrhea or report of pain. Pt is agitated, unkept and disoriented. Past Medical History/Comorbidities:  
Ms. Abhay Francis  has a past medical history of Acute respiratory failure ([facility] [address] Anemia, Arrhythmia, CAD (coronary artery disease), Cancer ([facility] [address] Cholelithiasis, COPD exacerbation ([facility] [address] Systolic murmur, and Unspecified adverse effect of anesthesia.   Ms. Abhay Francis  has a past surgical history that includes pr cardiac surg procedure unlist (2/8/2012); hx carotid endarterectomy (12/8/2008); hx vascular access (1/2013); hx breast lumpectomy (6/2012 8/2012); hx lap cholecystectomy (09/2010); hx hysterectomy (1969; 2008); hx aortic valve replacement (2/28/13); hx carotid endarterectomy (Left, 10/2016); AORTIC VALVE REPLACEMENT (AVR) (N/A, 2/28/2013); and ESOPHAGEAL TRANS ECHOCARDIOGRAM (N/A, 2/28/2013). Social History/Living Environment:  
Home Environment: Private residence # Steps to Enter: 3 Rails to Enter: Yes Hand Rails : Right One/Two Story Residence: Two story, live on 1st floor Lift Chair Available: No 
Living Alone: No 
Support Systems: Spouse/Significant Other/Partner Patient Expects to be Discharged to[de-identified] Private residence Current DME Used/Available at Home: Walker, rolling, Cane, straight Prior Level of Function/Work/Activity: 
Indep with gait and ADLs, 1 fall, drives, on 2.5 L O2 Number of Personal Factors/Comorbidities that affect the Plan of Care: 3+: HIGH COMPLEXITY EXAMINATION:  
Most Recent Physical Functioning:  
Gross Assessment: 
AROM: Generally decreased, functional 
Strength: Generally decreased, functional 
Coordination: Generally decreased, functional 
         
  
Posture: 
  
Balance: 
Sitting: Intact; Without support Standing: Impaired;Pull to stand; With support Bed Mobility: 
Supine to Sit: Minimum assistance Wheelchair Mobility: 
  
Transfers: 
Sit to Stand: Minimum assistance; Moderate assistance Stand to Sit: Minimum assistance Gait: 
  
Base of Support: Narrowed Speed/Rachel: Slow;Shuffled Step Length: Left shortened;Right shortened Gait Abnormalities: Decreased step clearance;Shuffling gait Distance (ft): 5 Feet (ft) Assistive Device: Walker, rolling Ambulation - Level of Assistance: Contact guard assistance;Minimal assistance Interventions: Safety awareness training; Tactile cues; Verbal cues Body Structures Involved: 1. Heart 2. Lungs 3. Bones 4. Muscles Body Functions Affected: 1. Cardio 2. Respiratory 3. Neuromusculoskeletal 
4. Movement Related Activities and Participation Affected: 1. General Tasks and Demands 2. Mobility 3. Self Care 4. Domestic Life 5. Interpersonal Interactions and Relationships 6. Community, Social and [place] Number of elements that affect the Plan of Care: 4+: HIGH COMPLEXITY CLINICAL PRESENTATION:  
Presentation: Evolving clinical presentation with changing clinical characteristics: MODERATE COMPLEXITY CLINICAL DECISION MAKING:  
[facility] AM-PAC 6 Clicks Basic Mobility Inpatient Short Form How much difficulty does the patient currently have. .. Unable A Lot A Little None 1. Turning over in bed (including adjusting bedclothes, sheets and blankets)? [] 1   [] 2   [x] 3   [] 4  
2. Sitting down on and standing up from a chair with arms ( e.g., wheelchair, bedside commode, etc.)   [] 1   [] 2   [x] 3   [] 4  
3. Moving from lying on back to sitting on the side of the bed? [] 1   [] 2   [x] 3   [] 4 How much help from another person does the patient currently need. .. Total A Lot A Little None 4. Moving to and from a bed to a chair (including a wheelchair)? [] 1   [] 2   [x] 3   [] 4  
5. Need to walk in hospital room? [] 1   [x] 2   [] 3   [] 4  
6. Climbing 3-5 steps with a railing? [x] 1   [] 2   [] 3   [] 4  
© 2007, Trustees of [facility], under license to [employer]. All rights reserved Score:  Initial: 15 Most Recent: X (Date: -- ) Interpretation of Tool:  Represents activities that are increasingly more difficult (i.e. Bed mobility, Transfers, Gait). Score 24 23 22-20 19-15 14-10 9-7 6 Modifier CH CI CJ CK CL CM CN   
 
? Mobility - Walking and Moving Around:  
  - CURRENT STATUS: CK - 40%-59% impaired, limited or restricted  - GOAL STATUS: CJ - 20%-39% impaired, limited or restricted  - D/C STATUS:  ---------------To be determined--------------- [place] / [place] B / Product Type: Medicare /   
 
Medical Necessity:    
· Patient is expected to demonstrate progress in strength, balance, coordination and functional technique to decrease assistance required with gait, transfers, and functional mobility. Sahil Kulkarni Reason for Services/Other Comments: 
· Patient continues to require skilled intervention due to  decreased strength, decreased balance, decreased functional tolerance, decreased cardiopulmonary endurance affecting participation in basic ADLs and functional tasks. Use of outcome tool(s) and clinical judgement create a POC that gives a: Clear prediction of patient's progress: LOW COMPLEXITY  
  
 
 
 
TREATMENT:  
(In addition to Assessment/Re-Assessment sessions the following treatments were rendered) Pre-treatment Symptoms/Complaints:  Weakness, fatigue Pain: Initial:  
Pain Intensity 1: 0  Post Session:  0/10 In addition to evaluation: 
Therapeutic Exercise: (10 Minutes):  Exercises per grid below to improve mobility and strength. Required minimal visual, verbal, manual and tactile cues to promote proper body alignment, promote proper body posture and promote proper body mechanics. Progressed range, repetitions and complexity of movement as indicated. Date: 
11/20/18 Date: 
 Date: Activity/Exercise Parameters Parameters Parameters LAQ 10 X B Alt marches 10 X B Ankle pumps 10 X B Heel slides 10 X B Hip abduction 10 X B Braces/Orthotics/Lines/Etc:  
· IV 
· mckenna catheter · O2 Device: Nasal cannula Treatment/Session Assessment:   
· Response to Treatment:  Ambulated 5 ft to bedside chair with RW and CGA-Gary · Interdisciplinary Collaboration:  
o Physical Therapist 
o Registered Nurse · After treatment position/precautions:  
o Up in chair 
o Bed alarm/tab alert on 
o Bed/Chair-wheels locked 
o Bed in low position 
o Call light within reach · Compliance with Program/Exercises: Will assess as treatment progresses · Recommendations/Intent for next treatment session:   \"Next visit will focus on advancements to more challenging activities and reduction in assistance provided\". Total Treatment Duration: PT Patient Time In/Time Out Time In: 8298 Time Out: 1455 Obed Gutierrez 55

## 2018-11-20 NOTE — PROGRESS NOTES
Patient is discharging via [facility] to [facility]. Room and report line given to RN. CM called patient's spouse to inform.

## 2018-11-20 NOTE — HOSPICE
[facility]- 
 
  I met with  in private area per his request.  He spent the night here and is disheveled looking and slightly confused. I discussed hospice services, hospice philosophy of care, care at home, care team members, and care at the [facility] for respite or symptom management. He didn't seem to understand what I was talking about and at one time he indicated that he did not want his wife to be resuscitated and then he said \"ofcourse I do\". I asked if he stay here today until the doctor came in to talk with him and he stated \"ofcourse\". Reviewed with Cheryl Fierro about pt and she will put in PT referral to assess pt's mobility.  indicated that pt was walking at home without the use of a walker prior to admission. He states that he works on the weekend and prn and \"that I cannot leave my wife alone if she can't walk\". I reviewed that wife cannot be left alone if mentally she can't care for herself. He was concerned that pt's bowels have not moved for \"5 days\" and passed on message to pt's staff RN. Hospice to be available prn. Thank you- [place] Day RN 
[facility] liaison [phone]

## 2018-11-21 PROBLEM — J44.1 COPD, FREQUENT EXACERBATIONS (HCC): Status: RESOLVED | Noted: 2018-01-01 | Resolved: 2018-01-01

## 2018-11-21 PROBLEM — N17.9 AKI (ACUTE KIDNEY INJURY) (HCC): Status: RESOLVED | Noted: 2018-01-01 | Resolved: 2018-01-01

## 2018-11-21 PROBLEM — E87.1 HYPONATREMIA: Status: RESOLVED | Noted: 2018-01-01 | Resolved: 2018-01-01

## 2018-11-21 PROBLEM — I10 HTN (HYPERTENSION), MALIGNANT: Status: ACTIVE | Noted: 2018-01-01

## 2018-11-21 PROBLEM — A41.9 SEPSIS (HCC): Status: RESOLVED | Noted: 2018-01-01 | Resolved: 2018-01-01

## 2018-11-21 PROBLEM — G93.41 ACUTE METABOLIC ENCEPHALOPATHY: Status: RESOLVED | Noted: 2018-01-01 | Resolved: 2018-01-01

## 2018-11-21 NOTE — PROGRESS NOTES
Problem: Falls - Risk of 
Goal: *Absence of Falls Document Aaron Mukherjee Fall Risk and appropriate interventions in the flowsheet. Outcome: Progressing Towards Goal 
Fall Risk Interventions: 
Mobility Interventions: Bed/chair exit alarm Mentation Interventions: Bed/chair exit alarm Medication Interventions: Bed/chair exit alarm Elimination Interventions: Call light in reach, Bed/chair exit alarm History of Falls Interventions: Bed/chair exit alarm

## 2018-11-21 NOTE — PROGRESS NOTES
Received bedside shift report from [place], [address] [place] [address]. Patient in bed, in its low and locked position with call bell within reach.  Patient alert and oriented x 4 with respirations regular and non-labored. '

## 2018-11-21 NOTE — PROGRESS NOTES
Patient has been resting in bed throughout night, removed O2 n/c numerous times during night with O2 sats decreased into 80s, patient lung sounds course, applied O2 n/c and patient O2 increased to 90%, patient denies needs @ this time, call light within reach.

## 2018-11-21 NOTE — PROGRESS NOTES
Pt refused for mckenna to be removed this PM she stated she was asleep and to wait til AM. SBAR was given to on coming nurse. Pt is stable.

## 2018-11-21 NOTE — PROGRESS NOTES
Pt is in bed alert and oriented with some confusion noted. rr are even and  Unlabored. O2 in place. O2 sat is 92%. Pt has dyspnea on exertion. abd is soft bowel sounds are active. Pt has no c/o pain no distress noted. Family at bedside. Safety measures in place will continue to monitor.

## 2018-11-21 NOTE — DISCHARGE SUMMARY
Hospitalist Discharge Summary Patient ID: 
Loly Cummings [id number] 
12 y.o. 
1940 Admit date: 11/15/2018  2:25 PM 
Discharge date and time: 11/21/2018 Attending: Cindy Valenzuela DO 
PCP:  Ellie Tucker MD 
Treatment Team: Attending Provider: Cindy Valenzuela DO; Utilization Review: Eller Saint, RN; Care Manager: Marydeclan Cabrera Principal Diagnosis Acute metabolic encephalopathy Principal Problem: 
  Acute metabolic encephalopathy (60/52/7922) Active Problems: 
  Essential hypertension, benign (1/2/2013) Mixed dyslipidemia (2/28/2013) S/P AVR (aortic valve replacement)-2/28/13 (2/28/2013) Sepsis ([facility] [address] (11/15/2018) YOSI (acute kidney injury) ([facility] [address] (11/15/2018) Hyponatremia (11/15/2018) Hospital Course: Patient admitted for acute metabolic encephalopathy, anemia s/p 1 unit PRBC, and hyponatremia. Hg found to be 6.2 but improved to 7.9. YOSI noted with creatine 1.38 which improved to 0.85. Hyponatremia noted to be at 128 but improved to 142. Hyponatremia secondary to poor PO intake. Has been eating and drinking well since in hospital. Patient also has end stages of COPD on 4L continuous at home. At 4L NC continuous now and at her baseline respiratory wise. Due to deconditioned state will discharge to rehab. Patient treated with 6 days of zosyn and vancomycin for potential infectious source but repeat blood cultures negative and no signs of pneumonia. First blood cultures contaminate. Negative UA. No new medications at discharge. Will need repeat H&H in two days. Please refer to the admission H&P for details of presentation. In summary, the patient is stable for discharge to rehab. Significant Diagnostic Studies:  
 
 
Labs: Results:  
   
Chemistry Recent Labs 11/19/18 
0550 GLU 75   
K 4.3 * CO2 29 BUN 12  
CREA 0.85 CA 7.4* AGAP 5* CBC w/Diff Recent Labs 11/19/18 
0550 WBC 10.7 RBC 2.86* HGB 7.9*  
HCT 26.8*  
 GRANS 66 LYMPH 18 EOS 1 Cardiac Enzymes No results for input(s): CPK, CKND1, AARON in the last 72 hours. No lab exists for component: [place] [facility] Coagulation No results for input(s): PTP, INR, APTT in the last 72 hours. No lab exists for component: INREXT Lipid Panel Lab Results Component Value Date/Time Cholesterol, total 267 (H) 09/09/2015 10:59 AM  
 HDL Cholesterol 67 09/09/2015 10:59 AM  
 LDL, calculated 169 (H) 09/09/2015 10:59 AM  
 VLDL, calculated 31 09/09/2015 10:59 AM  
 Triglyceride 155 (H) 09/09/2015 10:59 AM  
 CHOL/HDL Ratio 12.5 01/04/2013 03:15 AM  
  
BNP No results for input(s): BNPP in the last 72 hours. Liver Enzymes No results for input(s): TP, ALB, TBIL, AP, SGOT, GPT in the last 72 hours. No lab exists for component: DBIL Thyroid Studies Lab Results Component Value Date/Time TSH 0.519 11/15/2018 08:13 PM  
    
 
 
Discharge Exam: 
Visit Bria Villalobos /77 (BP Patient Position: At rest) Pulse 90 Temp 97.9 °F (36.6 °C) Resp 22 Ht 5' 3\" (1.6 m) Wt 49.9 kg (110 lb) SpO2 93% Breastfeeding? No  
BMI 19.49 kg/m² General appearance: alert, cooperative, no distress, appears stated age Lungs: clear to auscultation bilaterally, minimal breathe sounds noted Heart: regular rate and rhythm, S1, S2 normal, no murmur, click, rub or gallop Abdomen: soft, non-tender. Bowel sounds normal. No masses,  no organomegaly Extremities: no cyanosis or edema Neurologic: Grossly normal 
 
Disposition: rehab Discharge Condition: stable Patient Instructions: as above Current Discharge Medication List  
  
CONTINUE these medications which have CHANGED Details  
nicotine (NICODERM CQ) 14 mg/24 hr patch 1 Patch by TransDERmal route every twenty-four (24) hours for 30 days. Qty: 30 Patch, Refills: 0 CONTINUE these medications which have NOT CHANGED Details OXYGEN-AIR DELIVERY SYSTEMS 4 L by Nasal route continuous. albuterol (PROVENTIL HFA, VENTOLIN HFA, PROAIR HFA) 90 mcg/actuation inhaler Take 2 Puffs by inhalation every four (4) hours as needed for Wheezing. Qty: 1 Inhaler, Refills: 11  
  
albuterol-ipratropium (DUO-NEB) 2.5 mg-0.5 mg/3 ml nebu 3 mL by Nebulization route four (4) times daily. Qty: 360 Nebule, Refills: 3  
  
budesonide (PULMICORT) 0.5 mg/2 mL nbsp 2 mL by Nebulization route two (2) times a day. Qty: 60 Each, Refills: 11  
  
gabapentin (NEURONTIN) 100 mg capsule Take 1 Cap by mouth nightly. Qty: 30 Cap, Refills: 6 Associated Diagnoses: Pain in both lower extremities  
  
sertraline (ZOLOFT) 100 mg tablet Take 1 Tab by mouth daily. Qty: 30 Tab, Refills: 6  
  
metoprolol succinate (TOPROL-XL) 50 mg XL tablet Take 1 Tab by mouth daily. Qty: 90 Tab, Refills: 3  
  
aspirin delayed-release 81 mg tablet Take 1 Tab by mouth daily. Qty: 30 Tab, Refills: 11 Activity: up with assistance Diet: mechanical soft. Ensure with meals Wound Care: none Follow-up PCP in one week. Pulmonology in one week. · Time spent to discharge patient 35 minutes Signed: 
Luis Fernando Montana DO 
11/21/2018 
2:18 PM

## 2018-11-22 PROBLEM — J96.21 ACUTE ON CHRONIC RESPIRATORY FAILURE WITH HYPOXIA AND HYPERCAPNIA (HCC): Status: ACTIVE | Noted: 2018-01-01

## 2018-11-22 PROBLEM — J18.9 HCAP (HEALTHCARE-ASSOCIATED PNEUMONIA): Status: ACTIVE | Noted: 2018-01-01

## 2018-11-22 PROBLEM — J96.22 ACUTE ON CHRONIC RESPIRATORY FAILURE WITH HYPOXIA AND HYPERCAPNIA (HCC): Status: ACTIVE | Noted: 2018-01-01

## 2018-11-22 NOTE — ED PROVIDER NOTES
Pt d/c'd from hospital approx 3 hours ago and sent back from NH because they felt uncomfortable with her work of breathing. I called hospitalist prior to seeing her and they put in admission orders. The history is provided by the EMS personnel. Shortness of Breath This is a recurrent problem. The problem occurs continuously. The problem has not changed since onset. Associated symptoms include wheezing. Associated medical issues include COPD and chronic lung disease. Past Medical History:  
Diagnosis Date  Abnormal chest CT 1/10/2013 IMPRESSION: 1. No evidence of central or segmental pulmonary embolism. 2. Scattered reticulonodular opacities within the lungs with a tree-in-bud type pattern most commonly is due to an infectious process, usually bacterial. Atypical infection including mycobacterial infections cannot be excluded. Metastatic disease is felt to be less likely. Short interval followup imaging may be beneficial for r  Acute respiratory failure ([address] 10/2016  
 required intubation at [facility]  Anemia ANEMIA /GI BLEED 1- PRBC 2 UNITS  Arrhythmia HX SVT  Breast cancer ([facility] 1/2/2013 Right with lumpectomies x2 and radiation for 21 treatments 6/12.  CAD (coronary artery disease) aortic valve stenosis pending repair/replacement  Cancer ([address] 6/2012 BREAST CANCER RIGHT  Carotid stenosis 1/2/2013 S/p R CEA 2008 (Dr. 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  
 2/28/13 (Dr. Trace Solano) AVR 19mm [facility] Ease   SP 19mm kulkarni bioprosthetic AVR   
 S/P AVR (aortic valve replacement)-2/28/13 2/28/2013  Systolic murmur 5/0/8628  Unspecified adverse effect of anesthesia  OVERSEDATION, SEVERE HYPOTENSION  
 
 
 Past Surgical History:  
Procedure Laterality Date  CARDIAC SURG PROCEDURE UNLIST  2/8/2012  HX AORTIC VALVE REPLACEMENT  2/28/13 Dr. Trinidad Hidden  HX BREAST LUMPECTOMY  6/2012 8/2012 RIGHT  HX CAROTID ENDARTERECTOMY  12/8/2008 RIGHT  HX CAROTID ENDARTERECTOMY Left 10/2016 Dr. Virginie Lezama at [facility]; 2008  HX LAP CHOLECYSTECTOMY  09/2010  HX VASCULAR ACCESS  1/2013  
 pic line placed and removed Family History:  
Problem Relation Age of Onset  Stroke Mother  Lung Disease Father   
     Sedrick Model  Hypertension Other Social History Socioeconomic History  Marital status:  Spouse name: Not on file  Number of children: Not on file  Years of education: Not on file  Highest education level: Not on file Social Needs  Financial resource strain: Not on file  Food insecurity - worry: Not on file  Food insecurity - inability: Not on file  Transportation needs - medical: Not on file  Transportation needs - non-medical: Not on file Occupational History  Occupation: retired Employer: RETIRED Comment: sales at [employer] Tobacco Use  Smoking status: Current Every Day Smoker Packs/day: 0.25 Years: 50.00 Pack years: 12.50 Types: Cigarettes  Smokeless tobacco: Never Used  Tobacco comment: Pt states she smokes about 8 a day. Substance and Sexual Activity  Alcohol use: No  
  Alcohol/week: 0.0 oz  Drug use: No  
 Sexual activity: No  
Other Topics Concern  Not on file Social History Narrative . Lives with . She is originally from [place]. ALLERGIES: Demerol [meperidine]; Gabapentin; Lortab [hydrocodone-acetaminophen]; and Statins-hmg-coa reductase inhibitors Review of Systems Respiratory: Positive for shortness of breath and wheezing. Vitals:  
 11/21/18 1919 BP: (!) 218/93 Pulse: 99 Resp: 28 Temp: 97.9 °F (36.6 °C) SpO2: 98% Weight: 49.9 kg (110 lb) Height: 5' 4\" (1.626 m) Physical Exam  
Constitutional: She appears well-developed and well-nourished. Non-toxic appearance. She does not appear ill. No distress. Cardiovascular: Normal rate and regular rhythm. Pulmonary/Chest: She has decreased breath sounds in the right upper field, the right middle field, the right lower field, the left upper field, the left middle field and the left lower field. She has wheezes in the right middle field and the right lower field. Neurological: She is alert. Nursing note and vitals reviewed. MDM Number of Diagnoses or Management Options Diagnosis management comments: Looked at chart on pt's arrival.  D/c'd by hospitalist today. Called them. NH should have spoken to hospitalist and arranged for pt to go back as direct admit instead of sending her through ED. I did a MSE only. Patient requiring nonrebreather arrival.  I checked on her, ordered a breathing treatment, and hospitalist had already admitted her. Risk of Complications, Morbidity, and/or Mortality Presenting problems: minimal 
Diagnostic procedures: minimal 
Management options: minimal 
 
Patient Progress Patient progress: stable Procedures

## 2018-11-22 NOTE — PROGRESS NOTES
RT placing patient on bipap. Order to transfer to ICU noted. Will continue to monitor and call report to ICU when room assignment made.

## 2018-11-22 NOTE — PROGRESS NOTES
Called to bedside, patient tachypneic & stating \",I need my oxygen back on\", placed back on BIPAP from AirVo, RR in the mid 20's, oxygen saturation 98%.

## 2018-11-22 NOTE — CONSULTS
CONSULT NOTE [place] 11/22/2018 Date of Admission:  11/21/2018 The patient's chart is reviewed and the patient is discussed with the staff. Subjective:  
 
Patient is a 77 y.o.  female seen and evaluated at the request of Dr. Cheri Humphreys. The patient has a history of very severe COPD (FEV1 30% in 2017) with tobacco abuse, maintained on duoneb and pulmicort at home. She was started on 4L O2 continuous in September with worsening dyspnea and hypoxia. She was admitted here from 11/15 to 11/21 to the hospitalist service with metabolic encephalopathy, anemia requiring transfusion, YOSI, hyponatremia. Thought may have infection but seems that PNA was ruled out during that stay and cultures only showed likely contaminates with initial blood cultures growing diptheroids and coag neg staph. ABG during that admission showed metabolic alkalosis with mild compensatory respiratory acidosis. She was discharged yesterday to SNF, but on arrival there her SBP was over 200 and she was tachypneic and wheezing and returned to the ER. The patient denied any change in symptoms on arrival back here. She was readmitted by the hospitalist service. This morning the patient had taken her oxygen off and was found with sats in the 60's. She was somnolent and confused and an ABG was checked. This revealed hypcarbia with pCO2 of 76. The patient has been placed on Airvo 35L with 33% FiO2 with good sats. WBC is mildly elevated and CXR last night was concerning for developing edema or infiltrate in the right base. Patient is asleep, will briefly pull away with sternal rub and barely open eyes but then back to somnolent. Review of Systems Review of systems not obtained due to patient factors. Patient Active Problem List  
Diagnosis Code  Essential hypertension, benign I10  
 Cigarette nicotine dependence without complication M71.626  
 Mixed dyslipidemia E78.2  COPD, very severe ([facility] [address] J44.9  Diastolic heart failure (HCC) I50.30  Sepsis ([facility] [address] A41.9  Metabolic encephalopathy N46.24  
 HTN (hypertension), malignant I10  
 HCAP (healthcare-associated pneumonia) J18.9  Acute on chronic respiratory failure with hypoxia and hypercapnia (HCC) J96.21, J96.22 Prior to Admission Medications Prescriptions Last Dose Informant Patient Reported? Taking? OXYGEN-AIR DELIVERY SYSTEMS   Yes No  
Si L by Nasal route continuous. albuterol (PROVENTIL HFA, VENTOLIN HFA, PROAIR HFA) 90 mcg/actuation inhaler   No No  
Sig: Take 2 Puffs by inhalation every four (4) hours as needed for Wheezing. albuterol-ipratropium (DUO-NEB) 2.5 mg-0.5 mg/3 ml nebu   No No  
Sig: 3 mL by Nebulization route four (4) times daily. aspirin delayed-release 81 mg tablet   No No  
Sig: Take 1 Tab by mouth daily. budesonide (PULMICORT) 0.5 mg/2 mL nbsp   No No  
Si mL by Nebulization route two (2) times a day.  
gabapentin (NEURONTIN) 100 mg capsule   No No  
Sig: Take 1 Cap by mouth nightly. metoprolol succinate (TOPROL-XL) 50 mg XL tablet   No No  
Sig: Take 1 Tab by mouth daily. nicotine (NICODERM CQ) 14 mg/24 hr patch   No No  
Si Patch by TransDERmal route every twenty-four (24) hours for 30 days. sertraline (ZOLOFT) 100 mg tablet   No No  
Sig: Take 1 Tab by mouth daily. Facility-Administered Medications: None Past Medical History:  
Diagnosis Date  Abnormal chest CT 1/10/2013 IMPRESSION: 1. No evidence of central or segmental pulmonary embolism. 2. Scattered reticulonodular opacities within the lungs with a tree-in-bud type pattern most commonly is due to an infectious process, usually bacterial. Atypical infection including mycobacterial infections cannot be excluded. Metastatic disease is felt to be less likely. Short interval followup imaging may be beneficial for r  Acute respiratory failure ([facility] [address] 10/2016  
 required intubation at [facility]  
  Anemia ANEMIA /GI BLEED 1- PRBC 2 UNITS  Arrhythmia HX SVT  Breast cancer ([facility] 1/2/2013 Right with lumpectomies x2 and radiation for 21 treatments 6/12.  CAD (coronary artery disease) aortic valve stenosis pending repair/replacement  Cancer ([facility] 6/2012 BREAST CANCER RIGHT  Carotid stenosis 1/2/2013 S/p R CEA 2008 (Dr. 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  
 2/28/13 (Dr. Angie Alcala) AVR 19mm [facility] Ease   SP 19mm kulkarni bioprosthetic AVR   
 S/P AVR (aortic valve replacement)-2/28/13 2/28/2013  Systolic murmur 3/5/4437  Unspecified adverse effect of anesthesia OVERSEDATION, SEVERE HYPOTENSION Past Surgical History:  
Procedure Laterality Date  CARDIAC SURG PROCEDURE UNLIST  2/8/2012  HX AORTIC VALVE REPLACEMENT  2/28/13 Dr. Angie Alcala  HX BREAST LUMPECTOMY  6/2012 8/2012 RIGHT  HX CAROTID ENDARTERECTOMY  12/8/2008 RIGHT  HX CAROTID ENDARTERECTOMY Left 10/2016 Dr. Lanie Ruiz at [facility]; 2008  HX LAP CHOLECYSTECTOMY  09/2010  HX VASCULAR ACCESS  1/2013  
 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  
  Smoking status: Current Every Day Smoker Packs/day: 0.25 Years: 50.00 Pack years: 12.50 Types: Cigarettes  Smokeless tobacco: Never Used  Tobacco comment: Pt states she smokes about 8 a day. Substance and Sexual Activity  Alcohol use: No  
  Alcohol/week: 0.0 oz  Drug use: No  
 Sexual activity: No  
Other Topics Concern  Not on file Social History Narrative . Lives with . She is originally from [place]. Family History Problem Relation Age of Onset  Stroke Mother  Lung Disease Father   
     Prosper Chun  Hypertension Other Allergies Allergen Reactions  Demerol [Meperidine] Other (comments) Low blood pressure  Gabapentin Other (comments) Heart Racing  Lortab [Hydrocodone-Acetaminophen] Swelling HYPOTENSION 
  
 Statins-Hmg-Coa Reductase Inhibitors Unknown (comments) Current Facility-Administered Medications Medication Dose Route Frequency  piperacillin-tazobactam (ZOSYN) 4.5 g in 0.9% sodium chloride (MBP/ADV) 100 mL  4.5 g IntraVENous Q8H  
 [START ON 11/23/2018] vancomycin (VANCOCIN) 1,000 mg in 0.9% sodium chloride (MBP/ADV) 250 mL  1 g IntraVENous Q24H  
 sodium chloride (NS) flush 5-10 mL  5-10 mL IntraVENous PRN  
 sodium chloride 0.9 % bolus infusion 647 mL  647 mL IntraVENous ONCE  
 albuterol (PROVENTIL VENTOLIN) nebulizer solution 2.5 mg  2.5 mg Nebulization Q4H RT  
 [START ON 11/23/2018] lisinopril (PRINIVIL, ZESTRIL) tablet 5 mg  5 mg Oral DAILY  [START ON 11/23/2018] metoprolol succinate (TOPROL-XL) XL tablet 25 mg  25 mg Oral DAILY  albuterol-ipratropium (DUO-NEB) 2.5 MG-0.5 MG/3 ML  3 mL Nebulization Q4H PRN  
 aspirin delayed-release tablet 81 mg  81 mg Oral DAILY  budesonide (PULMICORT) 500 mcg/2 ml nebulizer suspension  500 mcg Nebulization BID RT  
 gabapentin (NEURONTIN) capsule 100 mg  100 mg Oral QHS  nicotine (NICODERM CQ) 14 mg/24 hr patch 1 Patch  1 Patch TransDERmal QHS  sertraline (ZOLOFT) tablet 100 mg  100 mg Oral DAILY  haloperidol lactate (HALDOL) injection 2 mg  2 mg IntraVENous Q4H PRN  
 hydrALAZINE (APRESOLINE) 20 mg/mL injection 20 mg  20 mg IntraVENous Q6H PRN  
 sodium chloride (NS) flush 5-10 mL  5-10 mL IntraVENous Q8H  
 sodium chloride (NS) flush 5-10 mL  5-10 mL IntraVENous PRN  
 acetaminophen (TYLENOL) tablet 650 mg  650 mg Oral Q4H PRN  
 HYDROcodone-acetaminophen (NORCO)  mg tablet 1 Tab  1 Tab Oral Q4H PRN  
 naloxone (NARCAN) injection 0.4 mg  0.4 mg IntraVENous PRN  
 diphenhydrAMINE (BENADRYL) capsule 25 mg  25 mg Oral Q4H PRN  
 bisacodyl (DULCOLAX) tablet 5 mg  5 mg Oral DAILY PRN  
 LORazepam (ATIVAN) tablet 1 mg  1 mg Oral BID PRN  
 enoxaparin (LOVENOX) injection 40 mg  40 mg SubCUTAneous Q24H  
 ondansetron (ZOFRAN) injection 4 mg  4 mg IntraVENous Q4H PRN Objective:  
 
Vitals:  
 11/22/18 6493 11/22/18 0720 11/22/18 6649 11/22/18 9327 BP: 167/67  97/54 Pulse: 83  88 Resp: 20  23 Temp: 97.6 °F (36.4 °C)  97.5 °F (36.4 °C) SpO2: 99% (!) 64% 90% 97% Weight:      
Height: PHYSICAL EXAM  
 
Constitutional:  the patient is thin and frail appearing. EENMT:  Sclera clear, pupils equal, oral mucosa moist 
Respiratory: Decreased with poor air movement throughout. Cardiovascular:  RRR without M,G,R 
Gastrointestinal: soft and non-tender; with positive bowel sounds. Musculoskeletal: warm without cyanosis. There is no lower leg edema. Skin:  no jaundice or rashes, no wounds Neurologic: unable to fully assess. Moves all 4 extremities. Psychiatric:  Somnolent. CXR:   
11/21/18 IMPRESSION: Interstitial densities in the right lung with suspected developing 
consolidation or edema in the right lung base. Recent Labs  
  11/22/18 
0552 11/21/18 
1936 WBC 13.6* 12.8* HGB 7.8* 8.3* HCT 27.7* 28.8*  
 311 Recent Labs  
  11/22/18 
0552 11/21/18 
1936  141  
K 4.7 4.5  
 101 GLU 99 168* CO2 35* 39* BUN 15 15 CREA 0.60 0.72 CA 9.1 8.4 ALB  --  2.4* TBILI  --  0.2 ALT  --  24 SGOT  --  13* No results for input(s): PH, PCO2, PO2, HCO3 in the last 72 hours. No results for input(s): LCAD, LAC in the last 72 hours. Assessment:  (Medical Decision Making) Hospital Problems  Date Reviewed: 10/15/2018 Codes Class Noted POA HCAP (healthcare-associated pneumonia) ICD-10-CM: J18.9 ICD-9-CM: 491  11/22/2018 Unknown Acute on chronic respiratory failure with hypoxia and hypercapnia (HCC) ICD-10-CM: J96.21, J96.22 
ICD-9-CM: 518.84, 786.09, 799.02  11/22/2018 Unknown HTN (hypertension), malignant ICD-10-CM: I10 
ICD-9-CM: 401.0  11/21/2018 Yes * (Principal) Sepsis ([facility] ICD-10-CM: A41.9 ICD-9-CM: 038.9, 995.91  11/15/2018 Unknown Metabolic encephalopathy TBX-30-LR: G93.41 
ICD-9-CM: 348.31  11/15/2018 Unknown Diastolic heart failure (HCC) (Chronic) ICD-10-CM: I50.30 ICD-9-CM: 428.30  3/9/2013 Yes Mixed dyslipidemia (Chronic) ICD-10-CM: X40.6 ICD-9-CM: 272.2  2/28/2013 Yes COPD, very severe ([address] (Chronic) ICD-10-CM: J44.9 ICD-9-CM: 588  2/28/2013 Yes Overview Addendum 11/30/2017  9:09 AM by Lu Simental, MALIK  
  FEV1 26% Patient with very severe COPD at baseline, on chronic O2 at home 4L, now with hypercarbia as well. Somnolent and barely responds to sternal rub. Poor air movement, cannot tell if COPD is exacerbated, but there is some concern for a possible RLL HAP and on vanc and zosyn for that. Plan:  (Medical Decision Making) --needs to be placed on bipap for hypercarbia. Will ask RT to place, repeat abg in 2 hours and try to move to ICU if bed available.  
-cont vanc and zosyn. -f/u blood cultures.  
-add iv steroids.  
-cont albuterol and pulmicort. -currently full code. Intubation would mean a poor prognosis.  spoke with family during recent hospitalization but not open to their services. More than 50% of the time documented was spent in face-to-face contact with the patient and in the care of the patient on the floor/unit where the patient is located. Thank you very much for this referral.  We appreciate the opportunity to participate in this patient's care. Will follow along with above stated plan.  
 
Aki Epperson MD

## 2018-11-22 NOTE — PROGRESS NOTES
TRANSFER - IN REPORT: 
 
Verbal report received from [facility] on Brain Brownie  being received from ED for routine progression of care Report consisted of patients Situation, Background, Assessment and  
Recommendations(SBAR). Information from the following report(s) Kardex was reviewed with the receiving nurse. Opportunity for questions and clarification was provided. Assessment completed upon patients arrival to unit and care assumed.

## 2018-11-22 NOTE — PROGRESS NOTES
Patient remains on this unit until ABG's have been drawn and resulted. Safety measures remain in place. Patient is on BiPap at this time. SBAR to JUAN SPRINGER, [address] [facility].

## 2018-11-22 NOTE — PROGRESS NOTES
Received pt to ICU on 4L NC with sats of 93%. No increased WOB, no distress noted. Placed on Aervo on 40L @40% for comfort. Sats 99%

## 2018-11-22 NOTE — PROGRESS NOTES
Patient resting in bed, alert with periods of confusion, cooperative with care, breathing even and unlabored, denies pain or distress, refused skin assessment,  safety measures in place.

## 2018-11-22 NOTE — PROGRESS NOTES
Pharmacokinetic Consult to Pharmacist 
 
Gt Taylor is a 77 y.o. female being treated for HAP with vancomycin and pip/tazo. Height: 5' 4\" (162.6 cm)  Weight: 54.9 kg (121 lb) Lab Results Component Value Date/Time BUN 15 11/22/2018 05:52 AM  
 Creatinine 0.60 11/22/2018 05:52 AM  
 WBC 13.6 (H) 11/22/2018 05:52 AM  
 Procalcitonin 0.3 11/15/2018 02:55 PM  
 Lactic acid 1.5 11/15/2018 08:13 PM  
 Lactic acid 1.9 01/03/2013 09:52 AM  
 Lactic Acid (POC) 0.72 11/21/2018 07:43 PM  
  
Estimated Creatinine Clearance: 65.7 mL/min (based on SCr of 0.6 mg/dL). CULTURES: 
All Micro Results Procedure Component Value Units Date/Time CULTURE, RESPIRATORY/SPUTUM/BRONCH Virgle Adenike STAIN [[id number]] Order Status:  Sent Specimen:  Sputum Day 1 of vancomycin. Goal trough is 15-20. Vancomycin dose initiated at 1500 mg IV x 1 followed by 1250 mg IV q24h. Levels will be ordered as clinically indicated. Pharmacy will continue to follow. Please call with any questions. Thank you, Joel Bianchi, PharmD Clinical Pharmacist 
[phone]

## 2018-11-22 NOTE — ED NOTES
TRANSFER - OUT REPORT: 
 
Verbal report given to Abiodun Hernadez (name) on Kelvin Dawson  being transferred to [facility](unit) for routine progression of care Report consisted of patients Situation, Background, Assessment and  
Recommendations(SBAR). Information from the following report(s) SBAR was reviewed with the receiving nurse. Lines:  
Peripheral IV 11/21/18 Right Wrist (Active) Site Assessment Clean, dry, & intact 11/21/2018  7:34 PM  
Phlebitis Assessment 0 11/21/2018  7:34 PM  
Infiltration Assessment 0 11/21/2018  7:34 PM  
Dressing Status Clean, dry, & intact 11/21/2018  7:34 PM  
  
 
Opportunity for questions and clarification was provided. Patient transported with: 
 [employer]

## 2018-11-22 NOTE — H&P
HOSPITALIST H&PNAME:  Gracie American Age:  77 y.o. 
:   1940 MRN:   [id number] PCP: Juan Daniel Banegas MD 
Treatment Team: Attending Provider: Connor Bolanos MD; Primary Nurse: Perla Velez RN 
 
Prior CC: Reason for admission is: malig HTN; tachypnea HPI:  
Patient history was obtained from the ER provider prior to seeing the patient. Patient is a 77 y.o. female who presents to the ER from the SNF/rehab that she was discharged to approximately 3-4 hours ago. She was just hospitalized from 11/15- for acute metabolic encephalopathy and sepsis. She was stable at the time of discharge. Apparently, upon arrival to SNF, her SBP was over 200 and her RR was elevated with wheezing. They told EMS that they would not accept patient and to send her back to the ER. Pt is a poor historian and does not report any changes in symptoms since this morning. She has end stage COPD and is always wheezing and SOB to some degree. She c/o only feeling \"hot\". ROS: 
All systems have been reviewed and are negative except as stated in HPI or elsewhere. Past Medical History:  
Diagnosis Date  Abnormal chest CT 1/10/2013 IMPRESSION: 1. No evidence of central or segmental pulmonary embolism. 2. Scattered reticulonodular opacities within the lungs with a tree-in-bud type pattern most commonly is due to an infectious process, usually bacterial. Atypical infection including mycobacterial infections cannot be excluded. Metastatic disease is felt to be less likely. Short interval followup imaging may be beneficial for r  Acute respiratory failure ([facility] 10/2016  
 required intubation at [address]  Anemia ANEMIA /GI BLEED 2013 PRBC 2 UNITS  Arrhythmia HX SVT  Breast cancer ([facility] 2013 Right with lumpectomies x2 and radiation for 21 treatments .  CAD (coronary artery disease) aortic valve stenosis pending repair/replacement  Cancer ([facility] 6/2012 BREAST CANCER RIGHT  Carotid stenosis 1/2/2013 S/p R CEA 2008 (Dr. 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  
 2/28/13 (Dr. Chika Villa) [facility]   SP 19mm kulkarni bioprosthetic AVR   
 S/P AVR (aortic valve replacement)-2/28/13 2/28/2013  Systolic murmur 3[birth year]0/1950  Unspecified adverse effect of anesthesia OVERSEDATION, SEVERE HYPOTENSION Past Surgical History:  
Procedure Laterality Date  CARDIAC SURG PROCEDURE UNLIST  2/8/2012  HX AORTIC VALVE REPLACEMENT  2/28/13 Dr. Chika Villa  HX BREAST LUMPECTOMY  6/2012 8/2012 RIGHT  HX CAROTID ENDARTERECTOMY  12/8/2008 RIGHT  HX CAROTID ENDARTERECTOMY Left 10/2016 Dr. Ira Beckford at [facility]; 2008  HX LAP CHOLECYSTECTOMY  09/2010  HX VASCULAR ACCESS  1/2013  
 pic line placed and removed Social History Tobacco Use  Smoking status: Current Every Day Smoker Packs/day: 0.25 Years: 50.00 Pack years: 12.50 Types: Cigarettes  Smokeless tobacco: Never Used  Tobacco comment: Pt states she smokes about 8 a day. Substance Use Topics  Alcohol use: No  
  Alcohol/week: 0.0 oz Family History Problem Relation Age of Onset  Stroke Mother  Lung Disease Father   
     Spenser Bal  Hypertension Other FH Reviewed and non-contributory to admitting diagnosis Allergies Allergen Reactions  Demerol [Meperidine] Other (comments) Low blood pressure  Gabapentin Other (comments) Heart Racing  Lortab [Hydrocodone-Acetaminophen] Swelling HYPOTENSION 
  
 Statins-Hmg-Coa Reductase Inhibitors Unknown (comments) Prior to Admission Medications Prescriptions Last Dose Informant Patient Reported? Taking? OXYGEN-AIR DELIVERY SYSTEMS   Yes No  
Si L by Nasal route continuous. albuterol (PROVENTIL HFA, VENTOLIN HFA, PROAIR HFA) 90 mcg/actuation inhaler   No No  
Sig: Take 2 Puffs by inhalation every four (4) hours as needed for Wheezing. albuterol-ipratropium (DUO-NEB) 2.5 mg-0.5 mg/3 ml nebu   No No  
Sig: 3 mL by Nebulization route four (4) times daily. aspirin delayed-release 81 mg tablet   No No  
Sig: Take 1 Tab by mouth daily. budesonide (PULMICORT) 0.5 mg/2 mL nbsp   No No  
Si mL by Nebulization route two (2) times a day.  
gabapentin (NEURONTIN) 100 mg capsule   No No  
Sig: Take 1 Cap by mouth nightly. metoprolol succinate (TOPROL-XL) 50 mg XL tablet   No No  
Sig: Take 1 Tab by mouth daily. nicotine (NICODERM CQ) 14 mg/24 hr patch   No No  
Si Patch by TransDERmal route every twenty-four (24) hours for 30 days. sertraline (ZOLOFT) 100 mg tablet   No No  
Sig: Take 1 Tab by mouth daily. Facility-Administered Medications: None Objective:  
 
Patient Vitals for the past 24 hrs: 
 Temp Pulse Resp BP SpO2  
18     99 % 18 97.9 °F (36.6 °C) 99 28 (!) 218/93 98 % No intake or output data in the 24 hours ending 18 Temp (24hrs), Av °F (36.7 °C), Min:97.9 °F (36.6 °C), Max:98.1 °F (36.7 °C) Oxygen Therapy O2 Sat (%): 99 % (18) O2 Device: Non-rebreather mask (18) O2 Flow Rate (L/min): 15 l/min (18) Body mass index is 18.88 kg/m². Physical Exam: 
 
General:    WD and WN, No apparent distress. Head:   Normocephalic, without obvious abnormality, atraumatic. Eyes:  PERRL; EOMI; sclera normal/non-icteric ENT:  Hearing is normal.  Oropharynx is clear with tacky mucous membranes Resp:    Clear to auscultation bilaterally. No Wheezing or Rhonchi. Resp are even and unlabored Heart[de-identified]  Regular rate and rhythm,  no murmur,   No LE edema Abdomen:   Soft, non-tender. Not distended. Bowel sounds normal.  hepato-splenomegaly -none Musc/SK: Muscle strength is fair and tone normal; No cyanosis. No clubbing Skin:     Texture, turgor normal. No significant rashes or lesions. Neurologic: CN II - XII are grossly intact - other than Eye exam as noted above Psych: Very drowsy and oriented x 4;  Judgement and insight are normal 
  
Data Review: No results found for this or any previous visit (from the past 24 hour(s)). CXR Results  (Last 48 hours) None CT Results  (Last 48 hours) None Assessment and Plan: Active Hospital Problems Diagnosis Date Noted  HTN (hypertension), malignant 11/21/2018  Diastolic heart failure ([facility] 03/09/2013  COPD, very severe ([facility] 02/28/2013 FEV1 26% Principal Problem: 
  HTN (hypertension), malignant (11/21/2018) Home meds; add lisinopril; consider hydralazine Active Problems: 
  COPD, very severe ([address] (2/28/2013) Home meds Diastolic heart failure ([address] (3/9/2013) Home meds · PLAN General 
·  
· Cont appropriate home meds (see MAR) · Control symptoms (pain, n/v, fever, etc) · Monitor appropriate labs · DVT prophylaxis:  Lovenox · Code status: Full;  HCPOA:  
· Risk: high · Anticipated DC needs: · Estimated LOS:  Greater than 2 midnights · Plans discussed with patient and/or caregiver; questions answered. Med records reviewed if applicable; findings:  
 
Critical care time if applicable:   
 
Signed By: Mouna Plascencia MD   
 November 21, 2018

## 2018-11-22 NOTE — PROGRESS NOTES
Dr. Harriet Easton notified of patient WBC of 13.6. MD will treat accordingly. Will continue to monitor.

## 2018-11-22 NOTE — PROGRESS NOTES
Pt arrived to unit on 4L NC O2 sat 93%. Alert and oriented to person only. HR77 /60. Abrasions noted to bilateral big toes and right knee. Scattered ecchymosis to BUE. Sacrum intact, red and blachable. Allevyn placed.

## 2018-11-22 NOTE — PROGRESS NOTES
TRANSFER - OUT REPORT: 
 
Verbal report given to LATRICIA Conteh on Oli Garza  being transferred to ICU  for change in patient condition(need for BIPAP) Report consisted of patients Situation, Background, Assessment and  
Recommendations(SBAR). Information from the following report(s) SBAR, ED Summary, [facility] and Recent Results was reviewed with the receiving nurse. Opportunity for questions and clarification was provided. Patient transported with: 
 Registered Nurse Tech BiPAP

## 2018-11-22 NOTE — PROGRESS NOTES
Report called to [facility] to [place], RN. Patient ready for transport.

## 2018-11-22 NOTE — PROGRESS NOTES
Progress Note Patient: Rosann Heimlich MRN: [id number]  SSN: [id number] YOB: 1940  Age: 77 y.o. Sex: female Admit Date: 11/21/2018 LOS: 1 day Subjective:  
F/U hypoxia, acute metabolic encephalopathy, and HCAP Patient discharged yesterday after was admitted for sepsis of unknown infectious source, hyponatremia, anemia s/p 1 unit PRBC, and COPD exacerbation. Patient was treated with zosyn and vancomycin for 6 days during that time. No signs of pneumonia and repeat blood cultures negative from original blood cultures that were thought to be contaminate. After being discharged, SNF found patient to have BP over 200 and had worsening wheezing. Patient admitted again for malignant HTN. Around 7:22AM, patient found to now have lower BP with MAP 68 and oxygen saturations 64%. Patient had taken off her nasal cannula stating \"she does not need oxygen. \" When placed back on 4L NC oxygen saturations improved to 98%. Patient keeping eyes closed unless told to open and can only answer half my questions appropriately. WBC increased to 13.8. Hg 7.8. CO2 35. Chest x ray showed Interstitial densities in the right lung with suspected developing 
consolidation or edema in the right lung base. Current Facility-Administered Medications Medication Dose Route Frequency  lisinopril (PRINIVIL, ZESTRIL) tablet 10 mg  10 mg Oral DAILY  piperacillin-tazobactam (ZOSYN) 4.5 g in 0.9% sodium chloride (MBP/ADV) 100 mL  4.5 g IntraVENous Q8H  
 vancomycin (VANCOCIN) 1500 mg in  ml infusion  1,500 mg IntraVENous ONCE  
 [START ON 11/23/2018] vancomycin (VANCOCIN) 1,000 mg in 0.9% sodium chloride (MBP/ADV) 250 mL  1 g IntraVENous Q24H  
 albuterol-ipratropium (DUO-NEB) 2.5 MG-0.5 MG/3 ML  3 mL Nebulization Q4H PRN  
 aspirin delayed-release tablet 81 mg  81 mg Oral DAILY  budesonide (PULMICORT) 500 mcg/2 ml nebulizer suspension  500 mcg Nebulization BID RT  
  gabapentin (NEURONTIN) capsule 100 mg  100 mg Oral QHS  metoprolol succinate (TOPROL-XL) XL tablet 50 mg  50 mg Oral DAILY  nicotine (NICODERM CQ) 14 mg/24 hr patch 1 Patch  1 Patch TransDERmal QHS  sertraline (ZOLOFT) tablet 100 mg  100 mg Oral DAILY  haloperidol lactate (HALDOL) injection 2 mg  2 mg IntraVENous Q4H PRN  
 hydrALAZINE (APRESOLINE) 20 mg/mL injection 20 mg  20 mg IntraVENous Q6H PRN  
 sodium chloride (NS) flush 5-10 mL  5-10 mL IntraVENous Q8H  
 sodium chloride (NS) flush 5-10 mL  5-10 mL IntraVENous PRN  
 acetaminophen (TYLENOL) tablet 650 mg  650 mg Oral Q4H PRN  
 HYDROcodone-acetaminophen (NORCO)  mg tablet 1 Tab  1 Tab Oral Q4H PRN  
 naloxone (NARCAN) injection 0.4 mg  0.4 mg IntraVENous PRN  
 diphenhydrAMINE (BENADRYL) capsule 25 mg  25 mg Oral Q4H PRN  
 bisacodyl (DULCOLAX) tablet 5 mg  5 mg Oral DAILY PRN  
 LORazepam (ATIVAN) tablet 1 mg  1 mg Oral BID PRN  
 enoxaparin (LOVENOX) injection 40 mg  40 mg SubCUTAneous Q24H  
 ondansetron (ZOFRAN) injection 4 mg  4 mg IntraVENous Q4H PRN  
 albuterol (PROVENTIL VENTOLIN) nebulizer solution 2.5 mg  2.5 mg Nebulization Q4H PRN Objective:  
 
Vitals:  
 11/22/18 3970 11/22/18 0720 11/22/18 2938 11/22/18 1558 BP: 167/67  97/54 Pulse: 83  88 Resp: 20  23 Temp: 97.6 °F (36.4 °C)  97.5 °F (36.4 °C) SpO2: 99% (!) 64% 90% 97% Weight:      
Height:      
  
  
Intake and Output: 
Current Shift: No intake/output data recorded. Last three shifts: No intake/output data recorded. Physical Exam:  
General:  Able to answer half my questions appropriately. No acute distress. Increased work of breathing noted. Eyes:  Conjunctivae/corneas clear. PERRL, EOMs intact. Fundi benign. Will open eyes on command. Ears:  Normal TMs and external ear canals both ears. Nose: Nares normal. Septum midline. Mouth/Throat: Lips, mucosa, and tongue normal. Teeth and gums normal. No facial droop noted. Neck: no JVD. Back:   deferred Lungs:   Mild crackles at DIMITRI and LLL but otherwise clear. Heart:  Regular rate and rhythm, S1, S2 normal, no murmur, click, rub or gallop. Abdomen:   Soft, non-tender. Bowel sounds normal. No masses,  No organomegaly. Extremities: Extremities normal, atraumatic, no cyanosis or edema. Good strength to extremities bilaterally Pulses: 2+ and symmetric all extremities. Skin: Skin color, texture, turgor normal. No rashes or lesions Lymph nodes: Cervical, supraclavicular, and axillary nodes normal.  
Neurologic: Weak appearing. No acute distress. Less alert compared to yesterday during discharge. Lab/Data Review: 
 
Recent Results (from the past 24 hour(s)) EKG, 12 LEAD, INITIAL Collection Time: 11/21/18  7:29 PM  
Result Value Ref Range Ventricular Rate 93 BPM  
 Atrial Rate 93 BPM  
 P-R Interval 160 ms QRS Duration 124 ms Q-T Interval 346 ms  
 QTC Calculation (Bezet) 430 ms Calculated P Axis 86 degrees Calculated R Axis 122 degrees Calculated T Axis 70 degrees Diagnosis    
  !! AGE AND GENDER SPECIFIC ECG ANALYSIS !! Normal sinus rhythm Right atrial enlargement Right bundle branch block Abnormal ECG When compared with ECG of 15-NOV-2018 18:36, 
Premature ventricular complexes are no longer Present NE interval has decreased Non-specific change in ST segment in Anterior leads T wave inversion no longer evident in Anterior leads Confirmed by Myrna David ([id number]) on 11/22/2018 7:15:24 AM 
  
CBC WITH AUTOMATED DIFF Collection Time: 11/21/18  7:36 PM  
Result Value Ref Range WBC 12.8 (H) 4.3 - 11.1 K/uL  
 RBC 3.00 (L) 4.05 - 5.2 M/uL HGB 8.3 (L) 11.7 - 15.4 g/dL HCT 28.8 (L) 35.8 - 46.3 % MCV 96.0 79.6 - 97.8 FL  
 MCH 27.7 26.1 - 32.9 PG  
 MCHC 28.8 (L) 31.4 - 35.0 g/dL  
 RDW 15.9 % PLATELET 782 760 - 113 K/uL MPV 10.1 9.4 - 12.3 FL ABSOLUTE NRBC 0.00 0.0 - 0.2 K/uL  
 DF AUTOMATED NEUTROPHILS 80 (H) 43 - 78 % LYMPHOCYTES 8 (L) 13 - 44 % MONOCYTES 11 4.0 - 12.0 % EOSINOPHILS 0 (L) 0.5 - 7.8 % BASOPHILS 0 0.0 - 2.0 % IMMATURE GRANULOCYTES 1 0.0 - 5.0 %  
 ABS. NEUTROPHILS 10.2 (H) 1.7 - 8.2 K/UL  
 ABS. LYMPHOCYTES 1.0 0.5 - 4.6 K/UL  
 ABS. MONOCYTES 1.4 (H) 0.1 - 1.3 K/UL  
 ABS. EOSINOPHILS 0.0 0.0 - 0.8 K/UL  
 ABS. BASOPHILS 0.0 0.0 - 0.2 K/UL  
 ABS. IMM. GRANS. 0.1 0.0 - 0.5 K/UL METABOLIC PANEL, COMPREHENSIVE Collection Time: 11/21/18  7:36 PM  
Result Value Ref Range Sodium 141 136 - 145 mmol/L Potassium 4.5 3.5 - 5.1 mmol/L Chloride 101 98 - 107 mmol/L  
 CO2 39 (H) 21 - 32 mmol/L Anion gap 1 (L) 7 - 16 mmol/L Glucose 168 (H) 65 - 100 mg/dL BUN 15 8 - 23 MG/DL Creatinine 0.72 0.6 - 1.0 MG/DL  
 GFR est AA >60 >60 ml/min/1.73m2 GFR est non-AA >60 >60 ml/min/1.73m2 Calcium 8.4 8.3 - 10.4 MG/DL Bilirubin, total 0.2 0.2 - 1.1 MG/DL  
 ALT (SGPT) 24 12 - 65 U/L  
 AST (SGOT) 13 (L) 15 - 37 U/L Alk. phosphatase 73 50 - 136 U/L Protein, total 6.5 6.3 - 8.2 g/dL Albumin 2.4 (L) 3.2 - 4.6 g/dL Globulin 4.1 (H) 2.3 - 3.5 g/dL A-G Ratio 0.6 (L) 1.2 - 3.5 POC LACTIC ACID Collection Time: 11/21/18  7:43 PM  
Result Value Ref Range Lactic Acid (POC) 0.72 0.5 - 1.9 mmol/L METABOLIC PANEL, BASIC Collection Time: 11/22/18  5:52 AM  
Result Value Ref Range Sodium 143 136 - 145 mmol/L Potassium 4.7 3.5 - 5.1 mmol/L Chloride 102 98 - 107 mmol/L  
 CO2 35 (H) 21 - 32 mmol/L Anion gap 6 (L) 7 - 16 mmol/L Glucose 99 65 - 100 mg/dL BUN 15 8 - 23 MG/DL Creatinine 0.60 0.6 - 1.0 MG/DL  
 GFR est AA >60 >60 ml/min/1.73m2 GFR est non-AA >60 >60 ml/min/1.73m2 Calcium 9.1 8.3 - 10.4 MG/DL  
CBC WITH AUTOMATED DIFF Collection Time: 11/22/18  5:52 AM  
Result Value Ref Range WBC 13.6 (H) 4.3 - 11.1 K/uL  
 RBC 2.87 (L) 4.05 - 5.2 M/uL HGB 7.8 (L) 11.7 - 15.4 g/dL HCT 27.7 (L) 35.8 - 46.3 % MCV 96.5 79.6 - 97.8 FL  
 MCH 27.2 26.1 - 32.9 PG  
 MCHC 28.2 (L) 31.4 - 35.0 g/dL  
 RDW 15.9 % PLATELET 835 441 - 290 K/uL MPV 10.1 9.4 - 12.3 FL ABSOLUTE NRBC 0.00 0.0 - 0.2 K/uL  
 DF AUTOMATED NEUTROPHILS 77 43 - 78 % LYMPHOCYTES 9 (L) 13 - 44 % MONOCYTES 13 (H) 4.0 - 12.0 % EOSINOPHILS 0 (L) 0.5 - 7.8 % BASOPHILS 0 0.0 - 2.0 % IMMATURE GRANULOCYTES 1 0.0 - 5.0 %  
 ABS. NEUTROPHILS 10.4 (H) 1.7 - 8.2 K/UL  
 ABS. LYMPHOCYTES 1.3 0.5 - 4.6 K/UL  
 ABS. MONOCYTES 1.8 (H) 0.1 - 1.3 K/UL  
 ABS. EOSINOPHILS 0.0 0.0 - 0.8 K/UL  
 ABS. BASOPHILS 0.0 0.0 - 0.2 K/UL  
 ABS. IMM. GRANS. 0.1 0.0 - 0.5 K/UL  
POC G3 Collection Time: 11/22/18  9:12 AM  
Result Value Ref Range Device: NASAL CANNULA pH (POC) 7.335 (L) 7.35 - 7.45    
 pCO2 (POC) 76.5 (HH) 35 - 45 MMHG  
 pO2 (POC) 104 (H) 75 - 100 MMHG  
 HCO3 (POC) 40.8 (H) 22 - 26 MMOL/L  
 sO2 (POC) 97 95 - 98 % Base excess (POC) 13 mmol/L Allens test (POC) YES Site LEFT BRACHIAL Patient temp. 98.6 Specimen type (POC) ARTERIAL Performed by Socorro   
 CO2, POC 43 MMOL/L Flow rate (POC) 4.000 L/min Respiratory comment: PhysicianNotified COLLECT TIME 908 Assessment/ Plan:  
 
Principal Problem: 
  Sepsis ([facility] (11/15/2018) Active Problems: 
  Mixed dyslipidemia (2/28/2013) [facility] (2/28/2013) Overview: FEV1 26% Diastolic heart failure ([facility] (3/9/2013) Metabolic encephalopathy (59/37/7711) HTN (hypertension), malignant (11/21/2018) HCAP (healthcare-associated pneumonia) (11/22/2018) Sepsis secondary to HCAP. Order respiratory culture and blood cultures. Start vancomycin and zosyn. Sepsis bolus. Sepsis protocol. Of note, EF 41% with diastolic dysfunction in 7490. Monitor for signs of fluid overload.   
 
Acute metabolic encephalopathy likely from episode of hypoxia this AM. Satting at 98% on 4L NC. ABG showed ph 7.33, pCO2 76.5 (is a CO2 retainer), O2 104, and HCO3 40.8. Will consult Pulmonology due to end stages of COPD along with recommendations on possible need for high flow versus BiPAP. I appreciate their input. MAR does not show she has taken any sedating medications that can alter mentation. HCAP - abx as above. Schedule albuterol and pulmicort. Recheck H&H this PM.  
 
Will reduce ACE and BB dose since border line hypotensive this AM. Will hold BB dose until receives fluids to ensure no hypotension. DVT prophylaxis - Lovenox Signed By: Freda Keating DO November 22, 2018

## 2018-11-22 NOTE — PROGRESS NOTES
DEBBIE recevied from [facility]. Patient remains in stable condition with respirations even/unlabored. No acute distress noted at this time. Oxygen noted per nasal cannula. Call light remains within reach, patient encouraged to call nurse prn assist. Will continue to monitor per policy.

## 2018-11-22 NOTE — PROGRESS NOTES
Pt now moved to ICU. I was called with an ABG on BIPAP that was acceptable and she had been taken off BIPAP. I then ordered another blood gas off BIPAP but in the interim she apparently got more confused and was placed back on BIPAP. Since her transfer, she has been on Airvo and is comfortable. Will check ABG on Airvo and follow.   
 
Patricia Hall MD

## 2018-11-22 NOTE — PROGRESS NOTES
Patient found earlier this am without her oxygen on. CNA reports that patient had not been wearing her nasal cannula earlier in the morning. Patient reports she took off her oxygen because, \"I didn't need it. \" CNA reports SpO2 in the 60's. Patient is breathing unlabored at this time but is somnolecent and confused. MD at bedside. Stat ABGs ordered. Will continue to monitor.

## 2018-11-22 NOTE — PROGRESS NOTES
Placed on [address] @ 33% post ABG, slightly labored breathing & RR in the mid 20's. Oxygen saturation 97%. Breath sounds diminished.

## 2018-11-22 NOTE — PROGRESS NOTES
TRANSFER - IN REPORT: 
 
Verbal report received from Chantale (name) on Jama Ganser  being received from 8th floor(unit) for change in patient condition(increasing O2 demands) Report consisted of patients Situation, Background, Assessment and  
Recommendations(SBAR). Information from the following report(s) SBAR, Kardex, ED Summary and MAR was reviewed with the receiving nurse. Opportunity for questions and clarification was provided. Assessment will be completed upon pt arrival to unit.

## 2018-11-23 NOTE — PROGRESS NOTES
Bedside shift report received from [place], [address] [place] [address]. Pt currently on Bipap, resting quietly. Responds to voice. Alert and oriented to self only.

## 2018-11-23 NOTE — PROGRESS NOTES
Bedside shift report received from [facility]. Pt oriented to person only, follows commands appropriately. Moves all extremities. Afebrile, VSS on airvo 40 L/40%. Lungs diminished bilaterally. Skin assessed. Bruising in all extremities. Sacrum red and blanchable.

## 2018-11-23 NOTE — PROGRESS NOTES
Initial visit was made, prayer, emotional support and a spiritual presence were provided.  card was left with the patient. Courtney Brown

## 2018-11-23 NOTE — PROGRESS NOTES
ABG drawn while patient is on Airvo per Dr. Nitesh Newman. ABG results show rising CO2, but patient is close to her normal/baseline CO2 retainer ABG with a pH of 7.33. Dr. Lane Garza notified of results. States he thinks she is fine on Airvo, but needs to wear BiPAP tonight QHS.

## 2018-11-23 NOTE — PROGRESS NOTES
Progress Note Patient: Bryant Roque MRN: [id number]  SSN: [id number] YOB: 1940  Age: 77 y.o. Sex: female Admit Date: 11/21/2018 LOS: 2 days Subjective:  
F/U hypoxia, acute metabolic encephalopathy, and HCAP Patient discharged 11/21 after was admitted for sepsis of unknown infectious source, hyponatremia, anemia s/p 1 unit PRBC, and COPD exacerbation. Patient was treated with zosyn and vancomycin for 6 days during that time. No signs of pneumonia and repeat blood cultures negative from original blood cultures that were thought to be contaminate. After being discharged, SNF found patient to have BP over 200 and had worsening wheezing. Patient admitted again for malignant HTN on 11/22. Around 7:22AM on day of discharge, patient found to now have lower BP with MAP 68 and oxygen saturations 64%. Patient had taken off her nasal cannula stating \"she does not need oxygen. \" When placed back on 4L NC oxygen saturations improved to 98%. Chest x ray showed Interstitial densities in the right lung with suspected developing consolidation or edema in the right lung base. Patient placed on BiPAP for hypercarbia. Attempted to wean to high flow yesterday but patient would become dyspneic. On airvo 7L currently and alert. Vitals show BP increasing compared to yesterday. WBC 8 today. Hg 7.3 from 7.7. Blood cultures have no growth to date. Have not been able to get UA or respiratory culture yet. Current Facility-Administered Medications Medication Dose Route Frequency  piperacillin-tazobactam (ZOSYN) 4.5 g in 0.9% sodium chloride (MBP/ADV) 100 mL  4.5 g IntraVENous Q8H  
 vancomycin (VANCOCIN) 1,000 mg in 0.9% sodium chloride (MBP/ADV) 250 mL  1 g IntraVENous Q24H  
 sodium chloride (NS) flush 5-10 mL  5-10 mL IntraVENous PRN  
 albuterol (PROVENTIL VENTOLIN) nebulizer solution 2.5 mg  2.5 mg Nebulization Q4H RT  
  lisinopril (PRINIVIL, ZESTRIL) tablet 5 mg  5 mg Oral DAILY  metoprolol succinate (TOPROL-XL) XL tablet 25 mg  25 mg Oral DAILY  methylPREDNISolone (PF) (SOLU-MEDROL) injection 40 mg  40 mg IntraVENous Q8H  
 albuterol-ipratropium (DUO-NEB) 2.5 MG-0.5 MG/3 ML  3 mL Nebulization Q4H PRN  
 aspirin delayed-release tablet 81 mg  81 mg Oral DAILY  budesonide (PULMICORT) 500 mcg/2 ml nebulizer suspension  500 mcg Nebulization BID RT  
 gabapentin (NEURONTIN) capsule 100 mg  100 mg Oral QHS  nicotine (NICODERM CQ) 14 mg/24 hr patch 1 Patch  1 Patch TransDERmal QHS  sertraline (ZOLOFT) tablet 100 mg  100 mg Oral DAILY  haloperidol lactate (HALDOL) injection 2 mg  2 mg IntraVENous Q4H PRN  
 hydrALAZINE (APRESOLINE) 20 mg/mL injection 20 mg  20 mg IntraVENous Q6H PRN  
 sodium chloride (NS) flush 5-10 mL  5-10 mL IntraVENous Q8H  
 sodium chloride (NS) flush 5-10 mL  5-10 mL IntraVENous PRN  
 acetaminophen (TYLENOL) tablet 650 mg  650 mg Oral Q4H PRN  
 HYDROcodone-acetaminophen (NORCO)  mg tablet 1 Tab  1 Tab Oral Q4H PRN  
 naloxone (NARCAN) injection 0.4 mg  0.4 mg IntraVENous PRN  
 diphenhydrAMINE (BENADRYL) capsule 25 mg  25 mg Oral Q4H PRN  
 bisacodyl (DULCOLAX) tablet 5 mg  5 mg Oral DAILY PRN  
 LORazepam (ATIVAN) tablet 1 mg  1 mg Oral BID PRN  
 enoxaparin (LOVENOX) injection 40 mg  40 mg SubCUTAneous Q24H  
 ondansetron (ZOFRAN) injection 4 mg  4 mg IntraVENous Q4H PRN Objective:  
 
Vitals:  
 11/23/18 4116 11/23/18 0701 11/23/18 0379 11/23/18 9415 BP: 132/61 110/52  162/69 Pulse: 76 79  92 Resp: 20 20 Temp:      
SpO2: 93% 94% 96% Weight:      
Height:      
  
  
Intake and Output: 
Current Shift: No intake/output data recorded. Last three shifts: 11/21 1901 - 11/23 0700 In: 1666 [P.O.:120; I.V.:947] Out: 500 [Urine:500] Physical Exam:  
General:  Able to answer half my questions appropriately. No acute distress. Eyes:  Conjunctivae/corneas clear. PERRL, EOMs intact. Fundi benign. Ears:  Normal TMs and external ear canals both ears. Nose: Nares normal. Septum midline. Mouth/Throat: Lips, mucosa, and tongue normal.   
Neck: no JVD. Back:   No lesions noted Lungs:   CTA bilaterally, more air movement today Heart:  Regular rate and rhythm, S1, S2 normal, no murmur, click, rub or gallop. Abdomen:   Soft, non-tender. Bowel sounds normal. No masses,  No organomegaly. Extremities: Extremities normal, atraumatic, no cyanosis or edema. Good strength to extremities bilaterally Pulses: 2+ and symmetric all extremities. Skin: Skin color, texture, turgor normal. No rashes or lesions Lymph nodes: Cervical, supraclavicular, and axillary nodes normal.  
Neurologic: More alert compared to yesterday. Lab/Data Review: 
 
Recent Results (from the past 24 hour(s)) POC G3 Collection Time: 11/22/18  1:22 PM  
Result Value Ref Range Device: BIPAP    
 FIO2 (POC) 33 % pH (POC) 7.372 7.35 - 7.45    
 pCO2 (POC) 59.3 (H) 35 - 45 MMHG  
 pO2 (POC) 77 75 - 100 MMHG  
 HCO3 (POC) 34.4 (H) 22 - 26 MMOL/L  
 sO2 (POC) 94 (L) 95 - 98 % Base excess (POC) 8 mmol/L Set Rate 8 bpm  
 Allens test (POC) YES Inspiratory Time 0.9 sec Site LEFT RADIAL Patient temp. 98.6 Specimen type (POC) ARTERIAL Performed by Socorro   
 CO2, POC 36 MMOL/L Respiratory comment: NurseNotified COLLECT TIME 1,313 POC G3 Collection Time: 11/22/18  3:42 PM  
Result Value Ref Range Device: BIPAP    
 FIO2 (POC) 33 % pH (POC) 7.438 7.35 - 7.45    
 pCO2 (POC) 54.0 (H) 35 - 45 MMHG  
 pO2 (POC) 59 (L) 75 - 100 MMHG  
 HCO3 (POC) 36.5 (H) 22 - 26 MMOL/L  
 sO2 (POC) 90 (L) 95 - 98 % Base excess (POC) 11 mmol/L Set Rate 10 bpm  
 PIP (POC) 12 Allens test (POC) YES Inspiratory Time 0.9 sec Site LEFT BRACHIAL Patient temp. 98.6  Specimen type (POC) ARTERIAL    
 Performed by Socorro   
 CO2, POC 38 MMOL/L Respiratory comment: NurseNotified COLLECT TIME 1,538 HGB & HCT Collection Time: 11/22/18  4:31 PM  
Result Value Ref Range HGB 7.7 (L) 11.7 - 15.4 g/dL HCT 27.3 (L) 35.8 - 46.3 % POC G3 Collection Time: 11/22/18  7:57 PM  
Result Value Ref Range Device: High Flow Nasal Cannula FIO2 (POC) 40 % pH (POC) 7.330 (L) 7.35 - 7.45    
 pCO2 (POC) 66.2 (HH) 35 - 45 MMHG  
 pO2 (POC) 84 75 - 100 MMHG  
 HCO3 (POC) 34.9 (H) 22 - 26 MMOL/L  
 sO2 (POC) 95 95 - 98 % Base excess (POC) 8 mmol/L Allens test (POC) YES Site RIGHT BRACHIAL Patient temp. 98.6 Specimen type (POC) ARTERIAL Performed by Garcia   
 CO2, POC 37 MMOL/L Flow rate (POC) 40.000 L/min Respiratory comment: PhysicianNotified COLLECT TIME 1,955 METABOLIC PANEL, COMPREHENSIVE Collection Time: 11/23/18  3:41 AM  
Result Value Ref Range Sodium 143 136 - 145 mmol/L Potassium 4.6 3.5 - 5.1 mmol/L Chloride 103 98 - 107 mmol/L  
 CO2 29 21 - 32 mmol/L Anion gap 11 7 - 16 mmol/L Glucose 155 (H) 65 - 100 mg/dL BUN 18 8 - 23 MG/DL Creatinine 0.76 0.6 - 1.0 MG/DL  
 GFR est AA >60 >60 ml/min/1.73m2 GFR est non-AA >60 >60 ml/min/1.73m2 Calcium 8.3 8.3 - 10.4 MG/DL Bilirubin, total 0.3 0.2 - 1.1 MG/DL  
 ALT (SGPT) 23 12 - 65 U/L  
 AST (SGOT) 16 15 - 37 U/L Alk. phosphatase 56 50 - 136 U/L Protein, total 6.1 (L) 6.3 - 8.2 g/dL Albumin 2.0 (L) 3.2 - 4.6 g/dL Globulin 4.1 (H) 2.3 - 3.5 g/dL A-G Ratio 0.5 (L) 1.2 - 3.5    
CBC WITH AUTOMATED DIFF Collection Time: 11/23/18  4:57 AM  
Result Value Ref Range WBC 8.0 4.3 - 11.1 K/uL  
 RBC 2.67 (L) 4.05 - 5.2 M/uL HGB 7.3 (L) 11.7 - 15.4 g/dL HCT 25.5 (L) 35.8 - 46.3 % MCV 95.5 79.6 - 97.8 FL  
 MCH 27.3 26.1 - 32.9 PG  
 MCHC 28.6 (L) 31.4 - 35.0 g/dL  
 RDW 15.9 % PLATELET 012 833 - 327 K/uL  MPV 10.2 9.4 - 12.3 FL  
 ABSOLUTE NRBC 0.00 0.0 - 0.2 K/uL  
 DF AUTOMATED NEUTROPHILS 89 (H) 43 - 78 % LYMPHOCYTES 6 (L) 13 - 44 % MONOCYTES 4 4.0 - 12.0 % EOSINOPHILS 0 (L) 0.5 - 7.8 % BASOPHILS 0 0.0 - 2.0 % IMMATURE GRANULOCYTES 1 0.0 - 5.0 %  
 ABS. NEUTROPHILS 7.2 1.7 - 8.2 K/UL  
 ABS. LYMPHOCYTES 0.5 0.5 - 4.6 K/UL  
 ABS. MONOCYTES 0.3 0.1 - 1.3 K/UL  
 ABS. EOSINOPHILS 0.0 0.0 - 0.8 K/UL  
 ABS. BASOPHILS 0.0 0.0 - 0.2 K/UL  
 ABS. IMM. GRANS. 0.1 0.0 - 0.5 K/UL Assessment/ Plan:  
 
Principal Problem: 
  Sepsis ([facility] [address] (11/15/2018) Active Problems: 
  Mixed dyslipidemia (2/28/2013) COPD, very severe ([address] (2/28/2013) Overview: FEV1 26% Diastolic heart failure ([address] (3/9/2013) Metabolic encephalopathy (51/32/3264) HTN (hypertension), malignant (11/21/2018) HCAP (healthcare-associated pneumonia) (11/22/2018) Acute on chronic respiratory failure with hypoxia and hypercapnia ([address] (11/22/2018) Sepsis secondary to HCAP. Ordered respiratory culture and blood cultures. Blood cultures negative to date. On vancomycin and zosyn started on 11/22. Acute metabolic encephalopathy likely from episode of hypoxia this AM. Her encephalopathy has improved today. Satting well on airvo. Pulmonology following for further recommendations. Albuterol q4hr. Pulmicort BID. Solumedrol 40mg q8hr. HCAP - abx as above. Schedule albuterol and pulmicort. Recheck H&H tomorrow AM due to recent admission for anemia. Hg slowly trending down but no obvious bleeding and likely from lab drawls. Increase BB back to home dose. Continue lower dose ACE. DVT prophylaxis - Lovenox Signed By: Santiago Thao,  November 23, 2018

## 2018-11-23 NOTE — PROGRESS NOTES
Critical Care Daily Progress Note: 11/23/2018 Shala Chamberlain Admission Date: 11/21/2018 The patient's chart is reviewed and the patient is discussed with the staff. 
 
77 y.o. CF evaluated at the request of Dr. Hazel Zapata with history of very severe COPD (FEV1 30% in 2017) with tobacco abuse, maintained on duoneb and pulmicort at home. She was started on 4L O2 continuous in September with worsening dyspnea and hypoxia.  
  
Was admitted here 11/15 to 11/21 to the hospitalist service with metabolic encephalopathy and anemia requiring transfusion, YOSI, hyponatremia. Thought may have infection but PNA was ruled out and cultures only showed likely contaminates. ABG during that admission showed metabolic alkalosis with mild compensatory respiratory acidosis. She was discharged yesterday to SNF, but on arrival there SBP was over 200, she was tachypneic and wheezing and returned to the ER. The patient denied any change in symptoms on arrival back here.  
  
She was readmitted by the hospitalist service. She had taken her O2 off and was found with sats in the 60's, was somnolent, confused and ABG revealed hypcarbia with pCO2 of 76. She was placed on Airvo 35L with 33% FiO2 with good sats. WBC is mildly elevated and CXR last night was concerning for developing edema or infiltrate in the right base. Patient is asleep, will briefly pull away with sternal rub and barely open eyes but then back to somnolent. was moved to ICU, placed on BIPAP and continued on antibiotics. Was weaned to Air-Vo but became confused and placed back on BIPAP. Subjective:  
 
Sitting up in bed, having episodes of confusion during the night and \"mentally trying to sort things out\". Wore BIPAP last night. States is still with some shortness of breath and remain on Air-Vo. Current Facility-Administered Medications Medication Dose Route Frequency  [START ON 11/24/2018] metoprolol succinate (TOPROL-XL) XL tablet 50 mg  50 mg Oral DAILY  metoprolol tartrate (LOPRESSOR) tablet 25 mg  25 mg Oral ONCE  piperacillin-tazobactam (ZOSYN) 4.5 g in 0.9% sodium chloride (MBP/ADV) 100 mL  4.5 g IntraVENous Q8H  
 vancomycin (VANCOCIN) 1,000 mg in 0.9% sodium chloride (MBP/ADV) 250 mL  1 g IntraVENous Q24H  
 sodium chloride (NS) flush 5-10 mL  5-10 mL IntraVENous PRN  
 albuterol (PROVENTIL VENTOLIN) nebulizer solution 2.5 mg  2.5 mg Nebulization Q4H RT  
 lisinopril (PRINIVIL, ZESTRIL) tablet 5 mg  5 mg Oral DAILY  methylPREDNISolone (PF) (SOLU-MEDROL) injection 40 mg  40 mg IntraVENous Q8H  
 albuterol-ipratropium (DUO-NEB) 2.5 MG-0.5 MG/3 ML  3 mL Nebulization Q4H PRN  
 aspirin delayed-release tablet 81 mg  81 mg Oral DAILY  budesonide (PULMICORT) 500 mcg/2 ml nebulizer suspension  500 mcg Nebulization BID RT  
 gabapentin (NEURONTIN) capsule 100 mg  100 mg Oral QHS  nicotine (NICODERM CQ) 14 mg/24 hr patch 1 Patch  1 Patch TransDERmal QHS  sertraline (ZOLOFT) tablet 100 mg  100 mg Oral DAILY  haloperidol lactate (HALDOL) injection 2 mg  2 mg IntraVENous Q4H PRN  
 hydrALAZINE (APRESOLINE) 20 mg/mL injection 20 mg  20 mg IntraVENous Q6H PRN  
 sodium chloride (NS) flush 5-10 mL  5-10 mL IntraVENous Q8H  
 sodium chloride (NS) flush 5-10 mL  5-10 mL IntraVENous PRN  
 acetaminophen (TYLENOL) tablet 650 mg  650 mg Oral Q4H PRN  
 HYDROcodone-acetaminophen (NORCO)  mg tablet 1 Tab  1 Tab Oral Q4H PRN  
 naloxone (NARCAN) injection 0.4 mg  0.4 mg IntraVENous PRN  
 diphenhydrAMINE (BENADRYL) capsule 25 mg  25 mg Oral Q4H PRN  
 bisacodyl (DULCOLAX) tablet 5 mg  5 mg Oral DAILY PRN  
 LORazepam (ATIVAN) tablet 1 mg  1 mg Oral BID PRN  
 enoxaparin (LOVENOX) injection 40 mg  40 mg SubCUTAneous Q24H  
 ondansetron (ZOFRAN) injection 4 mg  4 mg IntraVENous Q4H PRN Review of Systems Constitutional:  negative for fever, chills, sweats Cardiovascular:  negative for chest pain, palpitations, syncope, edema Gastrointestinal:  negative for dysphagia, reflux, vomiting, diarrhea, abdominal pain, or melena Neurologic:  negative for focal weakness, numbness, headache Objective:  
 
Vitals:  
 11/23/18 5781 11/23/18 0701 11/23/18 6804 11/23/18 6559 BP: 132/61 110/52  162/69 Pulse: 76 79  92 Resp: 20 20 Temp:      
SpO2: 93% 94% 96% Weight:      
Height:      
 
 
Intake and Output:  
11/21 1901 - 11/23 0700 In: 4102 [P.O.:120; I.V.:947] Out: 500 [Urine:500] No intake/output data recorded. Physical Exam:         
Constitutional:  the patient is well developed and in no acute distress, Air-Vo 30L sat98% EENMT:  Sclera clear, pupils equal, oral mucosa moist 
Respiratory: few anterior crackles, no wheezing Cardiovascular:  RRR without M,G,R 
Gastrointestinal: soft and non-tender; with positive bowel sounds. Musculoskeletal: warm without cyanosis. There is no lower leg edema. Skin:  Scattered ecchymosis on both arms, no jaundice or rashes, no wounds Neurologic: no gross neuro deficits Psychiatric:  alert and oriented x 2 LINES: 
PIV sites External catheter DRIPS:  None CXR: None today CXR 11/21/18: Interstitial densities in the right lung with suspected developing consolidation or edema in the right lung base. LAB No results for input(s): GLUCPOC in the last 72 hours. No lab exists for component: [place] Recent Labs  
  11/23/18 
0457 11/22/18 
1631 11/22/18 
0552 11/21/18 
1936 WBC 8.0  --  13.6* 12.8* HGB 7.3* 7.7* 7.8* 8.3* HCT 25.5* 27.3* 27.7* 28.8*  
  --  310 311 Recent Labs  
  11/23/18 
0341 11/22/18 
0552 11/21/18 
1936  143 141  
K 4.6 4.7 4.5  
 102 101 CO2 29 35* 39* * 99 168* BUN 18 15 15 CREA 0.76 0.60 0.72  
CA 8.3 9.1 8.4 ALB 2.0*  --  2.4* TBILI 0.3  --  0.2 ALT 23  --  24  
 SGOT 16  --  13* Recent Labs  
  11/22/18 
1957 11/22/18 
1542 11/22/18 
1322 PHI 7.330* 7.438 7.372 PCO2I 66.2* 54.0* 59.3*  
PO2I 84 59* 77 HCO3I 34.9* 36.5* 34.4* No results for input(s): LCAD, LAC in the last 72 hours. No results for input(s): PH, PCO2, PO2, HCO3 in the last 72 hours. Assessment:  (Medical Decision Making) Hospital Problems  Date Reviewed: 11/23/2018 Codes Class Noted POA HCAP (healthcare-associated pneumonia) ICD-10-CM: J18.9 ICD-9-CM: 657  11/22/2018 Unknown Continue current Rx Acute on chronic respiratory failure with hypoxia and hypercapnia (HCC) ICD-10-CM: J96.21, J96.22 
ICD-9-CM: 518.84, 786.09, 799.02  11/22/2018 Unknown On Air-Vo and wearing BIPAP at night--wean as tolerated HTN (hypertension), malignant ICD-10-CM: I10 
ICD-9-CM: 401.0  11/21/2018 Yes Chronic---160s * (Principal) Sepsis ([facility] [address] ICD-10-CM: A41.9 ICD-9-CM: 038.9, 995.91  11/15/2018 Unknown Hemodynamically stable Metabolic encephalopathy [facility]-59-NN: G93.41 
ICD-9-CM: 348.31  11/15/2018 Unknown Still with some confusion Diastolic heart failure (HCC) (Chronic) ICD-10-CM: I50.30 ICD-9-CM: 428.30  3/9/2013 Yes Chronic--does not appear to be volume overloaded Mixed dyslipidemia (Chronic) ICD-10-CM: C94.3 ICD-9-CM: 272.2  2/28/2013 Yes  
 chronic COPD, very severe ([facility] [address] (Chronic) ICD-10-CM: J44.9 ICD-9-CM: 656  2/28/2013 Yes FEV1 26% 
  
  
 chronic Plan:  (Medical Decision Making) --Albuterol, Pulmicort 
--Solu Medrol 40mg q8h 
--Zosyn and Vancomycin day 2 --Blood cultures:  pending --Hgb down to 7.3--follow --Wean Air-Vo as tolerated 
--NIPPV with sleep More than 50% of the time documented was spent in face-to-face contact with the patient and in the care of the patient on the floor/unit where the patient is located. Hoang López NP Lungs: Decreased BS Heart:  RRR with no Murmur/Rubs/Gallops Additional Comments: Now off BIPAP and comfortable on Airvo. Some confusion present. Will reduce steroids and hope to keep off of BIPAP tonight. I have spoken with and examined the patient. I agree with the above assessment and plan as documented.  
 
Dana Otoole MD

## 2018-11-23 NOTE — PROGRESS NOTES
Transitions of Care call deferred at this demetrius Patient readmitted to hospital 11/21 Possible hospice admission pending This note will not be viewable in [address]

## 2018-11-24 PROBLEM — D64.9 ANEMIA: Status: ACTIVE | Noted: 2018-01-01

## 2018-11-24 NOTE — PROGRESS NOTES
Received bedside shift report from [address]. Patient is alert and oriented to person, place and time, disoriented to situation. Patient stated that she was told yesterday that Kyaw Cazares was going to die. \" Patient is on AirVo 40L 30%FiO2, in normal sinus rhythm on the monitor, using bedpan to void, bilateral breath sounds are coarse and diminished, +1 pitting edema noted in lower extremities, vital signs stable, will continue to monitor.

## 2018-11-24 NOTE — PROGRESS NOTES
Blood bank states pt positive for unidentified antibodies. No blood available at this time for transfusion.

## 2018-11-24 NOTE — PROGRESS NOTES
Marked difference in blood pressures btwn right and left arm with left arm reading very low. picc placed to right upper arm requiring bp to left arm or lower extremities. D/w brian Adamson for bp every 4 hours to right arm below elbow.

## 2018-11-24 NOTE — PROGRESS NOTES
Bedside rounding with Dr. Jag Clayton. Updated on pt to include h&h. Dr. Jag Clayton will place orders.

## 2018-11-24 NOTE — PROGRESS NOTES
Progress Note Patient: Michael Saleem MRN: [id number]  SSN: [id number] YOB: 1940  Age: 77 y.o. Sex: female Admit Date: 11/21/2018 LOS: 3 days Subjective:  
F/U hypoxia, acute metabolic encephalopathy, and HCAP Patient discharged 11/21 after was admitted for sepsis of unknown infectious source, hyponatremia, anemia s/p 1 unit PRBC, and COPD exacerbation. Patient was treated with zosyn and vancomycin for 6 days during that time. No signs of pneumonia and repeat blood cultures negative from original blood cultures that were thought to be contaminate. After being discharged, SNF found patient to have BP over 200 and had worsening wheezing. Patient admitted again for malignant HTN on 11/22. Around 7:22AM on day of admission, patient found to now have lower BP with MAP 68 and oxygen saturations 64%. Patient had taken off her nasal cannula stating \"she does not need oxygen. \" When placed back on 4L NC oxygen saturations improved to 98%. Chest x ray showed Interstitial densities in the right lung with suspected developing consolidation or edema in the right lung base. Patient placed on BiPAP for hypercarbia. Attempted to wean from BiPAP but patient would become dyspneic. Transferred to ICU for closer monitoring. Currently on Airvo 40L at 36%. Vitals show BP has improved. Hg 7 from 7.3. Blood cultures have no growth to date. UA negative. Current Facility-Administered Medications Medication Dose Route Frequency  0.9% sodium chloride infusion 250 mL  250 mL IntraVENous PRN  
 diphenhydrAMINE (BENADRYL) injection 25 mg  25 mg IntraVENous Q4H PRN  
 metoprolol succinate (TOPROL-XL) XL tablet 50 mg  50 mg Oral DAILY  methylPREDNISolone (PF) (SOLU-MEDROL) injection 30 mg  30 mg IntraVENous Q12H  pantoprazole (PROTONIX) tablet 40 mg  40 mg Oral DAILY  piperacillin-tazobactam (ZOSYN) 4.5 g in 0.9% sodium chloride (MBP/ADV) 100 mL  4.5 g IntraVENous Q8H  
  vancomycin (VANCOCIN) 1,000 mg in 0.9% sodium chloride (MBP/ADV) 250 mL  1 g IntraVENous Q24H  
 sodium chloride (NS) flush 5-10 mL  5-10 mL IntraVENous PRN  
 albuterol (PROVENTIL VENTOLIN) nebulizer solution 2.5 mg  2.5 mg Nebulization Q4H RT  
 lisinopril (PRINIVIL, ZESTRIL) tablet 5 mg  5 mg Oral DAILY  albuterol-ipratropium (DUO-NEB) 2.5 MG-0.5 MG/3 ML  3 mL Nebulization Q4H PRN  
 aspirin delayed-release tablet 81 mg  81 mg Oral DAILY  budesonide (PULMICORT) 500 mcg/2 ml nebulizer suspension  500 mcg Nebulization BID RT  
 gabapentin (NEURONTIN) capsule 100 mg  100 mg Oral QHS  nicotine (NICODERM CQ) 14 mg/24 hr patch 1 Patch  1 Patch TransDERmal QHS  sertraline (ZOLOFT) tablet 100 mg  100 mg Oral DAILY  haloperidol lactate (HALDOL) injection 2 mg  2 mg IntraVENous Q4H PRN  
 hydrALAZINE (APRESOLINE) 20 mg/mL injection 20 mg  20 mg IntraVENous Q6H PRN  
 sodium chloride (NS) flush 5-10 mL  5-10 mL IntraVENous Q8H  
 sodium chloride (NS) flush 5-10 mL  5-10 mL IntraVENous PRN  
 acetaminophen (TYLENOL) tablet 650 mg  650 mg Oral Q4H PRN  
 HYDROcodone-acetaminophen (NORCO)  mg tablet 1 Tab  1 Tab Oral Q4H PRN  
 naloxone (NARCAN) injection 0.4 mg  0.4 mg IntraVENous PRN  
 diphenhydrAMINE (BENADRYL) capsule 25 mg  25 mg Oral Q4H PRN  
 bisacodyl (DULCOLAX) tablet 5 mg  5 mg Oral DAILY PRN  
 LORazepam (ATIVAN) tablet 1 mg  1 mg Oral BID PRN  
 enoxaparin (LOVENOX) injection 40 mg  40 mg SubCUTAneous Q24H  
 ondansetron (ZOFRAN) injection 4 mg  4 mg IntraVENous Q4H PRN Objective:  
 
Vitals:  
 11/24/18 5100 11/24/18 6717 11/24/18 5148 11/24/18 6628 BP: 106/49 114/69 Pulse: 67 71 Resp: 21 30 Temp:      
SpO2: 97% (!) 74% 95% 91% Weight:      
Height:      
  
  
Intake and Output: 
Current Shift: No intake/output data recorded. Last three shifts: 11/22 1901 - 11/24 0700 In: 1361 [P.O.:840; I.V.:450] Out: 500 [Urine:500] Physical Exam: General:  Able to answer more questions appropriately Eyes:  Conjunctivae/corneas clear. PERRL, EOMs intact. Fundi benign. Ears:  Normal TMs and external ear canals both ears. Nose: Nares normal. Septum midline. Mouth/Throat: Lips, mucosa, and tongue normal.   
Neck: no JVD. Back:   No lesions noted Lungs: Inspiratory rales at LLL Heart:  Regular rate and rhythm, S1, S2 normal, no murmur, click, rub or gallop. Abdomen:   Soft, non-tender. Bowel sounds normal. No masses,  No organomegaly. Extremities: Extremities normal, atraumatic, no cyanosis or edema. Good strength to extremities bilaterally Pulses: 2+ and symmetric all extremities. Skin: Skin color, texture, turgor normal. No rashes or lesions Lymph nodes: Cervical, supraclavicular, and axillary nodes normal.  
Neurologic: More alert compared to yesterday. Able to tell me who the president is and where she is located. Lab/Data Review: 
 
Recent Results (from the past 24 hour(s)) URINALYSIS W/O MICRO Collection Time: 11/23/18  9:43 AM  
Result Value Ref Range Color YELLOW Appearance CLEAR    
 pH (UA) 5.5 5.0 - 9.0 Protein 30 (A) NEG mg/dL Glucose NEGATIVE  mg/dL Ketone NEGATIVE  NEG mg/dL Bilirubin NEGATIVE  NEG Blood NEGATIVE  NEG Urobilinogen 0.2 0.2 - 1.0 EU/dL Nitrites NEGATIVE  NEG Leukocyte Esterase NEGATIVE  NEG    
 WBC 0-3 0 /hpf  
 RBC 0-3 0 /hpf Epithelial cells 3-5 0 /hpf Bacteria 0 0 /hpf Casts 0-3 0 /lpf HGB & HCT Collection Time: 11/24/18  3:33 AM  
Result Value Ref Range HGB 7.0 (L) 11.7 - 15.4 g/dL HCT 22.5 (L) 35.8 - 46.3 % Assessment/ Plan:  
 
Principal Problem: 
  Sepsis ([facility] (11/15/2018) Active Problems: 
  Mixed dyslipidemia (2/28/2013) COPD, very severe ([facility] (2/28/2013) Overview: FEV1 26% Diastolic [facility] (3/9/2013) Metabolic encephalopathy (58/33/2052) HTN (hypertension), malignant (11/21/2018) HCAP (healthcare-associated pneumonia) (11/22/2018) Acute on chronic respiratory failure with hypoxia and hypercapnia ([facility] (11/22/2018) Anemia (11/24/2018) Sepsis secondary to HCAP. Ordered respiratory culture that we have not been able to obtain and blood cultures negative to date. On vancomycin and zosyn started on 11/22. Acute metabolic encephalopathy likely from episode of hypoxia this AM. Her encephalopathy has improved today. Satting well on airvo. Pulmonology following for further recommendations. Albuterol q4hr. Pulmicort BID. Solumedrol 30mg q12hr. Overall slowly  Improving. HCAP - abx as above. Scheduledalbuterol and pulmicort. Currently on Airvo Hg 7 this AM. Will transfuse 2 units PRBC. Baseline Hg appears to be 8.8. Has normocytic anemia. TSH normal. Likely her anemia is from lab drawls versus current infection/inflammmation but will also check iron studies to ensure no early findings of iron deficiency anemia. Continue lisinopril 5mg daily and metoprolol succinate 50mg daily. DVT prophylaxis - Hold Lovenox. SCDs Signed By: Milli Sánchez DO November 24, 2018

## 2018-11-24 NOTE — PROGRESS NOTES
Bedside report received from Remington Barakat, [facility]. Pt resting quietly on bipap, arouses to name, denies needs, oriented to place. vss on monitor. Will continue assessment and care.

## 2018-11-24 NOTE — PROGRESS NOTES
Critical Care Daily Progress Note: 11/24/2018 Ulisses Knight Admission Date: 11/21/2018 The patient's chart is reviewed and the patient is discussed with the staff. 
 
77 y.o. CF evaluated at the request of Dr. Monique Lai with history of very severe COPD (FEV1 30% in 2017) with tobacco abuse, maintained on duoneb and pulmicort at home. She was started on 4L O2 continuous in September with worsening dyspnea and hypoxia.  
  
Was admitted here 11/15 to 11/21 to the hospitalist service with metabolic encephalopathy and anemia requiring transfusion, YOSI, hyponatremia. Thought may have infection but PNA was ruled out and cultures only showed likely contaminates. ABG during that admission showed metabolic alkalosis with mild compensatory respiratory acidosis. She was discharged yesterday to SNF, but on arrival there SBP was over 200, she was tachypneic and wheezing and returned to the ER. The patient denied any change in symptoms on arrival back here.  
  
She was readmitted by the hospitalist service. She had taken her O2 off and was found with sats in the 60's, was somnolent, confused and ABG revealed hypcarbia with pCO2 of 76. She was placed on Airvo 35L with 33% FiO2 with good sats. WBC is mildly elevated and CXR last night was concerning for developing edema or infiltrate in the right base. Patient is asleep, will briefly pull away with sternal rub and barely open eyes but then back to somnolent. was moved to ICU, placed on BIPAP and continued on antibiotics. Was weaned to Air-Vo but became confused and placed back on BIPAP. Subjective:  
 
Up in chair. Seems calmer. Wore BIPAP all night. Current Facility-Administered Medications Medication Dose Route Frequency  0.9% sodium chloride infusion 250 mL  250 mL IntraVENous PRN  
 diphenhydrAMINE (BENADRYL) injection 25 mg  25 mg IntraVENous Q4H PRN  
 metoprolol succinate (TOPROL-XL) XL tablet 50 mg  50 mg Oral DAILY  methylPREDNISolone (PF) (SOLU-MEDROL) injection 30 mg  30 mg IntraVENous Q12H  pantoprazole (PROTONIX) tablet 40 mg  40 mg Oral DAILY  piperacillin-tazobactam (ZOSYN) 4.5 g in 0.9% sodium chloride (MBP/ADV) 100 mL  4.5 g IntraVENous Q8H  
 vancomycin (VANCOCIN) 1,000 mg in 0.9% sodium chloride (MBP/ADV) 250 mL  1 g IntraVENous Q24H  
 sodium chloride (NS) flush 5-10 mL  5-10 mL IntraVENous PRN  
 albuterol (PROVENTIL VENTOLIN) nebulizer solution 2.5 mg  2.5 mg Nebulization Q4H RT  
 lisinopril (PRINIVIL, ZESTRIL) tablet 5 mg  5 mg Oral DAILY  albuterol-ipratropium (DUO-NEB) 2.5 MG-0.5 MG/3 ML  3 mL Nebulization Q4H PRN  
 aspirin delayed-release tablet 81 mg  81 mg Oral DAILY  budesonide (PULMICORT) 500 mcg/2 ml nebulizer suspension  500 mcg Nebulization BID RT  
 gabapentin (NEURONTIN) capsule 100 mg  100 mg Oral QHS  nicotine (NICODERM CQ) 14 mg/24 hr patch 1 Patch  1 Patch TransDERmal QHS  sertraline (ZOLOFT) tablet 100 mg  100 mg Oral DAILY  haloperidol lactate (HALDOL) injection 2 mg  2 mg IntraVENous Q4H PRN  
 hydrALAZINE (APRESOLINE) 20 mg/mL injection 20 mg  20 mg IntraVENous Q6H PRN  
 sodium chloride (NS) flush 5-10 mL  5-10 mL IntraVENous Q8H  
 sodium chloride (NS) flush 5-10 mL  5-10 mL IntraVENous PRN  
 acetaminophen (TYLENOL) tablet 650 mg  650 mg Oral Q4H PRN  
 HYDROcodone-acetaminophen (NORCO)  mg tablet 1 Tab  1 Tab Oral Q4H PRN  
 naloxone (NARCAN) injection 0.4 mg  0.4 mg IntraVENous PRN  
 diphenhydrAMINE (BENADRYL) capsule 25 mg  25 mg Oral Q4H PRN  
 bisacodyl (DULCOLAX) tablet 5 mg  5 mg Oral DAILY PRN  
 LORazepam (ATIVAN) tablet 1 mg  1 mg Oral BID PRN  
 ondansetron (ZOFRAN) injection 4 mg  4 mg IntraVENous Q4H PRN Review of Systems Constitutional:  negative for fever, chills, sweats Cardiovascular:  negative for chest pain, palpitations, syncope, edema Gastrointestinal:  negative for dysphagia, reflux, vomiting, diarrhea, abdominal pain, or melena Neurologic:  negative for focal weakness, numbness, headache Objective:  
 
Vitals:  
 11/24/18 0380 11/24/18 7511 11/24/18 0755 11/24/18 5776 BP: 114/69   154/68 Pulse: 71   78 Resp: 30   24 Temp:      
SpO2: (!) 74% 95% 91% 100% Weight:      
Height:      
 
 
Intake and Output:  
11/22 1901 - 11/24 0700 In: 2510 [P.O.:840; I.V.:450] Out: 500 [Urine:500] 11/24 0701 - 11/24 1900 In: -  
Out: 400 [Urine:400] Physical Exam:         
Constitutional:  the patient is well developed and in no acute distress, Air-Vo 35%  Sat 99% EENMT:  Sclera clear, pupils equal, oral mucosa moist 
Respiratory: Decreased BS bilaterally Cardiovascular:  RRR without M,G,R 
Gastrointestinal: soft and non-tender; with positive bowel sounds. Musculoskeletal: warm without cyanosis. There is no lower leg edema. Skin:  Scattered ecchymosis on both arms, no jaundice or rashes, no wounds Neurologic: no gross neuro deficits Psychiatric:  alert and FC 
 
LINES: 
PIV sites External catheter DRIPS:  None CXR: None today CXR 11/21/18: Interstitial densities in the right lung with suspected developing consolidation or edema in the right lung base. LAB No results for input(s): GLUCPOC in the last 72 hours. No lab exists for component: [place] Recent Labs  
  11/24/18 
7105 11/23/18 
0457 11/22/18 
1631 11/22/18 
0552 11/21/18 1936 WBC  --  8.0  --  13.6* 12.8* HGB 7.0* 7.3* 7.7* 7.8* 8.3* HCT 22.5* 25.5* 27.3* 27.7* 28.8* PLT  --  263  --  310 311 Recent Labs  
  11/23/18 
0341 11/22/18 
0552 11/21/18 1936  143 141  
K 4.6 4.7 4.5  
 102 101 CO2 29 35* 39* * 99 168* BUN 18 15 15 CREA 0.76 0.60 0.72  
CA 8.3 9.1 8.4 ALB 2.0*  --  2.4* TBILI 0.3  --  0.2 ALT 23  --  24 SGOT 16  --  13* Recent Labs  
  11/22/18 1957 11/22/18 
1542 11/22/18 
1322 PHI 7.330* 7.438 7.372 PCO2I 66.2* 54.0* 59.3*  
PO2I 84 59* 77  
 HCO3I 34.9* 36.5* 34.4* No results for input(s): LCAD, LAC in the last 72 hours. No results for input(s): PH, PCO2, PO2, HCO3 in the last 72 hours. Assessment:  (Medical Decision Making) Hospital Problems  Date Reviewed: 11/23/2018 Codes Class Noted POA HCAP (healthcare-associated pneumonia) ICD-10-CM: J18.9 ICD-9-CM: 134  11/22/2018 Unknown Continue current Rx Acute on chronic respiratory failure with hypoxia and hypercapnia (HCC) ICD-10-CM: J96.21, J96.22 
ICD-9-CM: 518.84, 786.09, 799.02  11/22/2018 Unknown On Air-Vo and wearing BIPAP at night--wean as tolerated HTN (hypertension), malignant ICD-10-CM: I10 
ICD-9-CM: 401.0  11/21/2018 Yes Stable * (Principal) Sepsis ([facility] ICD-10-CM: A41.9 ICD-9-CM: 038.9, 995.91  11/15/2018 Unknown Hemodynamically stable Metabolic encephalopathy XS-99-AK: G93.41 
ICD-9-CM: 348.31  11/15/2018 Unknown Still with some confusion Diastolic heart failure (HCC) (Chronic) ICD-10-CM: I50.30 ICD-9-CM: 428.30  3/9/2013 Yes Chronic--does not appear to be volume overloaded Mixed dyslipidemia (Chronic) ICD-10-CM: S52.5 ICD-9-CM: 272.2  2/28/2013 Yes  
 chronic COPD, very severe ([facility] (Chronic) ICD-10-CM: J44.9 ICD-9-CM: 007  2/28/2013 Yes FEV1 26% 
  
  
 chronic Anemia- getting 2U per hospitalists Plan:  (Medical Decision Making) Wean steroids further. Transfuse. Hope to keep off BIPAP tonight. ABG in AM. CXR in AM. More than 50% of the time documented was spent in face-to-face contact with the patient and in the care of the patient on the floor/unit where the patient is located.  
 
Jacqui Beckman MD

## 2018-11-24 NOTE — PROGRESS NOTES
PICC Placement Note PRE-PROCEDURE VERIFICATION Correct Procedure: yes time out completed 
 assistant Romeo Lobo RN in room and in agreement with procedure and time out. Correct Site:  yes Temperature: Temp: 97.7 °F (36.5 °C), Temperature Source: Temp Source: Oral 
Recent Labs  
  11/23/18 
0457 11/23/18 
0341 BUN  --  18  
CREA  --  0.76   --   
WBC 8.0  -- Allergies: Demerol [meperidine]; Gabapentin; Lortab [hydrocodone-acetaminophen]; and Statins-hmg-coa reductase inhibitors Education materials for [facility] given to patient or family. PROCEDURE DETAIL Time out completed all persons present in agreement with time out. A double lumen PICC line was started for vascular access. The following documentation is in addition to the PICC properties in the lines/airways flowsheet : 
Lot #: [id number] 
xylocaine used: yes Mid-Arm Circumference: 22 (cm) Internal Catheter Length: 38 (cm) Internal Catheter Total Length: 38 (cm) Vein Selection for PICC:right brachial 
Central Line Bundle followed yes Complication Related to Insertion: none Both the insertion guidewire and ECG guidewire were removed intact all ports have positive blood return and were flush well with normal saline. The placement was verified by ECG SAPIENS: yes. The ECG SAPIENS results state the tip overlies the lower superior vena cava. Line is okay to use: yes Uday Wood RN

## 2018-11-25 NOTE — PROGRESS NOTES
Increased tachypnea throughout the day, also patient is not as alert as yesterday, not eating food, placed on BIPAP due to general weak nature & shallow breathing.

## 2018-11-25 NOTE — PROGRESS NOTES
Progress Note Patient: Lianen Cornell MRN: [id number]  SSN: [id number] YOB: 1940  Age: 77 y.o. Sex: female Admit Date: 11/21/2018 LOS: 4 days Subjective:  
F/U hypoxia, acute metabolic encephalopathy, and HCAP Patient discharged 11/21 after was admitted for sepsis of unknown infectious source, hyponatremia, anemia s/p 1 unit PRBC, and COPD exacerbation. Patient was treated with zosyn and vancomycin for 6 days during that time. No signs of pneumonia and repeat blood cultures negative from original blood cultures that were thought to be contaminate. After being discharged, SNF found patient to have BP over 200 and had worsening wheezing. Patient admitted again for malignant HTN on 11/22. Around 7:22AM on day of admission, patient found to now have lower BP with MAP 68 and oxygen saturations 64%. Patient had taken off her nasal cannula stating \"she does not need oxygen. \" When placed back on 4L NC oxygen saturations improved to 98%. Chest x ray showed Interstitial densities in the right lung with suspected developing consolidation or edema in the right lung base. Patient placed on BiPAP for hypercarbia. Attempted to wean from BiPAP but patient would become dyspneic. Transferred to ICU for closer monitoring. Currently on High flow NC 4LNC. Vitals show BP is increasing. Hg 10 from 7. Blood cultures have no growth to date. UA negative. Chest x ray this AM showed Interstitial pulmonary edema and bilateral pleural effusions unchanged. Patient admits to having increased SOB this AM. Current Facility-Administered Medications Medication Dose Route Frequency  furosemide (LASIX) injection 20 mg  20 mg IntraVENous ONCE  
 0.9% sodium chloride infusion 250 mL  250 mL IntraVENous PRN  
 diphenhydrAMINE (BENADRYL) injection 25 mg  25 mg IntraVENous Q4H PRN  
 methylPREDNISolone (PF) (SOLU-MEDROL) injection 20 mg  20 mg IntraVENous Q12H  tiotropium (SPIRIVA) inhalation capsule 18 mcg  1 Cap Inhalation DAILY  sodium chloride (NS) flush 20 mL  20 mL InterCATHeter Q8H  
 heparin (porcine) pf 600 Units  600 Units InterCATHeter Q8H  
 sodium chloride (NS) flush 20 mL  20 mL InterCATHeter PRN  
 heparin (porcine) pf 600 Units  600 Units InterCATHeter PRN  
 metoprolol succinate (TOPROL-XL) XL tablet 50 mg  50 mg Oral DAILY  pantoprazole (PROTONIX) tablet 40 mg  40 mg Oral DAILY  piperacillin-tazobactam (ZOSYN) 4.5 g in 0.9% sodium chloride (MBP/ADV) 100 mL  4.5 g IntraVENous Q8H  
 vancomycin (VANCOCIN) 1,000 mg in 0.9% sodium chloride (MBP/ADV) 250 mL  1 g IntraVENous Q24H  
 sodium chloride (NS) flush 5-10 mL  5-10 mL IntraVENous PRN  
 albuterol (PROVENTIL VENTOLIN) nebulizer solution 2.5 mg  2.5 mg Nebulization Q4H RT  
 lisinopril (PRINIVIL, ZESTRIL) tablet 5 mg  5 mg Oral DAILY  aspirin delayed-release tablet 81 mg  81 mg Oral DAILY  budesonide (PULMICORT) 500 mcg/2 ml nebulizer suspension  500 mcg Nebulization BID RT  
 gabapentin (NEURONTIN) capsule 100 mg  100 mg Oral QHS  nicotine (NICODERM CQ) 14 mg/24 hr patch 1 Patch  1 Patch TransDERmal QHS  sertraline (ZOLOFT) tablet 100 mg  100 mg Oral DAILY  haloperidol lactate (HALDOL) injection 2 mg  2 mg IntraVENous Q4H PRN  
 hydrALAZINE (APRESOLINE) 20 mg/mL injection 20 mg  20 mg IntraVENous Q6H PRN  
 sodium chloride (NS) flush 5-10 mL  5-10 mL IntraVENous Q8H  
 sodium chloride (NS) flush 5-10 mL  5-10 mL IntraVENous PRN  
 acetaminophen (TYLENOL) tablet 650 mg  650 mg Oral Q4H PRN  
 HYDROcodone-acetaminophen (NORCO)  mg tablet 1 Tab  1 Tab Oral Q4H PRN  
 naloxone (NARCAN) injection 0.4 mg  0.4 mg IntraVENous PRN  
 diphenhydrAMINE (BENADRYL) capsule 25 mg  25 mg Oral Q4H PRN  
 bisacodyl (DULCOLAX) tablet 5 mg  5 mg Oral DAILY PRN  
 LORazepam (ATIVAN) tablet 1 mg  1 mg Oral BID PRN  
  ondansetron (ZOFRAN) injection 4 mg  4 mg IntraVENous Q4H PRN Objective:  
 
Vitals:  
 18 0406 18 0920 18 4286 18 8544 BP: 197/87 197/79 137/62 Pulse:  100  96 Resp:    25 Temp:      
SpO2:    98% Weight:      
Height:      
  
  
Intake and Output: 
Current Shift: 701 - 1900 In: -  
Out: 766 [YRKUS:212] Last three shifts: 1901 - 700 In: 1616.7 [P.O.:600; I.V.:400] Out: 3752 [BKOZ] Physical Exam:  
General:  Able to answer more questions appropriately Eyes:  Conjunctivae/corneas clear. PERRL, EOMs intact. Fundi benign. Ears:  Normal TMs and external ear canals both ears. Nose: Nares normal. Septum midline. Mouth/Throat: Lips, mucosa, and tongue normal.   
Neck: no JVD. Back:   No lesions noted Lungs: Inspiratory rales at LLL Heart:  Blowing systolic murmur best heard at right 2nd intercostal space Abdomen:   Soft, non-tender. Bowel sounds normal. No masses,  No organomegaly. Trace edema to UE bilaterally Pulses: 2+ and symmetric all extremities. Skin: Skin color, texture, turgor normal. No rashes or lesions Lymph nodes: Cervical, supraclavicular, and axillary nodes normal.  
Neurologic: More alert compared to yesterday. Lab/Data Review: 
 
Recent Results (from the past 24 hour(s)) METABOLIC PANEL, BASIC Collection Time: 18  1:22 AM  
Result Value Ref Range Sodium 142 136 - 145 mmol/L Potassium 4.4 3.5 - 5.1 mmol/L Chloride 101 98 - 107 mmol/L  
 CO2 36 (H) 21 - 32 mmol/L Anion gap 5 (L) 7 - 16 mmol/L Glucose 122 (H) 65 - 100 mg/dL BUN 23 8 - 23 MG/DL Creatinine 0.88 0.6 - 1.0 MG/DL  
 GFR est AA >60 >60 ml/min/1.73m2 GFR est non-AA >60 >60 ml/min/1.73m2 Calcium 7.9 (L) 8.3 - 10.4 MG/DL  
HGB & HCT Collection Time: 18  1:22 AM  
Result Value Ref Range HGB 10.0 (L) 11.7 - 15.4 g/dL HCT 31.7 (L) 35.8 - 46.3 % POC G3  
 Collection Time: 11/25/18  3:45 AM  
Result Value Ref Range Device: NASAL CANNULA pH (POC) 7.318 (L) 7.35 - 7.45    
 pCO2 (POC) 75.2 (HH) 35 - 45 MMHG  
 pO2 (POC) 89 75 - 100 MMHG  
 HCO3 (POC) 38.6 (H) 22 - 26 MMOL/L  
 sO2 (POC) 95 95 - 98 % Base excess (POC) 10 mmol/L Allens test (POC) YES Site RIGHT RADIAL Patient temp. 98.6 Specimen type (POC) ARTERIAL Performed by Dinorah   
 CO2, POC 41 MMOL/L Flow rate (POC) 4.000 L/min Respiratory comment: NurseNotified COLLECT TIME 340 Assessment/ Plan:  
 
Principal Problem: 
  Sepsis ([address] (11/15/2018) Active Problems: 
  Mixed dyslipidemia (2/28/2013) COPD, very severe ([address] (2/28/2013) Overview: FEV1 26% Diastolic heart failure ([address] (3/9/2013) Metabolic encephalopathy (38/24/6500) HTN (hypertension), malignant (11/21/2018) HCAP (healthcare-associated pneumonia) (11/22/2018) Acute on chronic respiratory failure with hypoxia and hypercapnia ([address] (11/22/2018) Anemia (11/24/2018) Sepsis secondary to HCAP. Ordered respiratory culture that we have not been able to obtain and blood cultures negative X3days. On vancomycin and zosyn started on 11/22. Acute metabolic encephalopathy likely from episode of hypoxia this AM. Her encephalopathy has improved today. Satting well on high flow. Pulmonology following for further recommendations. Albuterol q4hr. Pulmicort BID. Solumedrol 20mg q12hr. Overall slowly  Improving. Patient did have increased work of breathing noted on PE. Chest x ray showed pulmonary edema. Will give onetime dose IV furosemide 20mg. 2017 ECHO showed severe diastolic dysfunction with EF 71%. HCAP - abx as above. Scheduledalbuterol and pulmicort. Currently on high flow. Emphysema - At end stages. Pulmonology following to which I appreciate. Considering palliative care consult. HTN- improved s/p hydralazine. Will see if BP improves with lasix IV. Anemia shows early iron deficiency anemia. Will start Fe BID. Hg improved today. Continue lisinopril 5mg daily and metoprolol succinate 50mg daily. DVT prophylaxis - Hold Lovenox. SCDs Signed By: Dayron Calle DO November 25, 2018

## 2018-11-25 NOTE — PROGRESS NOTES
Critical Care Daily Progress Note: 11/25/2018 Ben Has Admission Date: 11/21/2018 The patient's chart is reviewed and the patient is discussed with the staff. 
 
77 y.o. CF evaluated at the request of Dr. 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 She was started on 4L O2 continuous in September with worsening dyspnea and hypoxia.  
  
Was admitted here 11/15 to 11/21 to the hospitalist service with metabolic encephalopathy and anemia requiring transfusion, YOSI, hyponatremia. Thought may have infection but PNA was ruled out and cultures only showed likely contaminates. ABG during that admission showed metabolic alkalosis with mild compensatory respiratory acidosis. She was discharged yesterday to SNF, but on arrival there SBP was over 200, she was tachypneic and wheezing and returned to the ER. The patient denied any change in symptoms on arrival back here.  
  
She was readmitted by the hospitalist service. She had taken her O2 off and was found with sats in the 60's, was somnolent, confused and ABG revealed hypcarbia with pCO2 of 76. She was placed on Airvo 35L with 33% FiO2 with good sats. WBC is mildly elevated and CXR last night was concerning for developing edema or infiltrate in the right base. Patient is asleep, will briefly pull away with sternal rub and barely open eyes but then back to somnolent. was moved to ICU, placed on BIPAP and continued on antibiotics. Was weaned to Air-Vo but became confused and placed back on BIPAP. Subjective:  
 
Up in chair. Required BIPAP again last night. BP high this AM- now better. Got 2U PRBC yesterday and lasix x 1 for evidence of pulmonary congestion. States she can only walk a few feet at home without assistance. Current Facility-Administered Medications Medication Dose Route Frequency  0.9% sodium chloride infusion 250 mL  250 mL IntraVENous PRN  
 diphenhydrAMINE (BENADRYL) injection 25 mg  25 mg IntraVENous Q4H PRN  
 methylPREDNISolone (PF) (SOLU-MEDROL) injection 20 mg  20 mg IntraVENous Q12H  
 tiotropium (SPIRIVA) inhalation capsule 18 mcg  1 Cap Inhalation DAILY  sodium chloride (NS) flush 20 mL  20 mL InterCATHeter Q8H  
 heparin (porcine) pf 600 Units  600 Units InterCATHeter Q8H  
 sodium chloride (NS) flush 20 mL  20 mL InterCATHeter PRN  
 heparin (porcine) pf 600 Units  600 Units InterCATHeter PRN  
 metoprolol succinate (TOPROL-XL) XL tablet 50 mg  50 mg Oral DAILY  pantoprazole (PROTONIX) tablet 40 mg  40 mg Oral DAILY  piperacillin-tazobactam (ZOSYN) 4.5 g in 0.9% sodium chloride (MBP/ADV) 100 mL  4.5 g IntraVENous Q8H  
 vancomycin (VANCOCIN) 1,000 mg in 0.9% sodium chloride (MBP/ADV) 250 mL  1 g IntraVENous Q24H  
 sodium chloride (NS) flush 5-10 mL  5-10 mL IntraVENous PRN  
 albuterol (PROVENTIL VENTOLIN) nebulizer solution 2.5 mg  2.5 mg Nebulization Q4H RT  
 lisinopril (PRINIVIL, ZESTRIL) tablet 5 mg  5 mg Oral DAILY  aspirin delayed-release tablet 81 mg  81 mg Oral DAILY  budesonide (PULMICORT) 500 mcg/2 ml nebulizer suspension  500 mcg Nebulization BID RT  
 gabapentin (NEURONTIN) capsule 100 mg  100 mg Oral QHS  nicotine (NICODERM CQ) 14 mg/24 hr patch 1 Patch  1 Patch TransDERmal QHS  sertraline (ZOLOFT) tablet 100 mg  100 mg Oral DAILY  haloperidol lactate (HALDOL) injection 2 mg  2 mg IntraVENous Q4H PRN  
 hydrALAZINE (APRESOLINE) 20 mg/mL injection 20 mg  20 mg IntraVENous Q6H PRN  
 sodium chloride (NS) flush 5-10 mL  5-10 mL IntraVENous Q8H  
 sodium chloride (NS) flush 5-10 mL  5-10 mL IntraVENous PRN  
 acetaminophen (TYLENOL) tablet 650 mg  650 mg Oral Q4H PRN  
 HYDROcodone-acetaminophen (NORCO)  mg tablet 1 Tab  1 Tab Oral Q4H PRN  
 naloxone (NARCAN) injection 0.4 mg  0.4 mg IntraVENous PRN  
  diphenhydrAMINE (BENADRYL) capsule 25 mg  25 mg Oral Q4H PRN  
 bisacodyl (DULCOLAX) tablet 5 mg  5 mg Oral DAILY PRN  
 LORazepam (ATIVAN) tablet 1 mg  1 mg Oral BID PRN  
 ondansetron (ZOFRAN) injection 4 mg  4 mg IntraVENous Q4H PRN Review of Systems Constitutional:  negative for fever, chills, sweats Cardiovascular:  negative for chest pain, palpitations, syncope, edema Gastrointestinal:  negative for dysphagia, reflux, vomiting, diarrhea, abdominal pain, or melena Neurologic:  negative for focal weakness, numbness, headache Objective:  
 
Vitals:  
 11/25/18 8960 11/25/18 0920 11/25/18 7965 11/25/18 2823 BP: 197/87 197/79 137/62 Pulse:  100  96 Resp:    25 Temp:      
SpO2:    98% Weight:      
Height:      
 
 
Intake and Output:  
11/23 1901 - 11/25 0700 In: 1616.7 [P.O.:600; I.V.:400] Out: 7386 [CLOKB:2208] 11/25 0701 - 11/25 1900 In: -  
Out: 493 [YVROJ:446] Physical Exam:         
Constitutional:  the patient is well developed and in mild acute distress, Air-Vo 35%  Sat 99% EENMT:  Sclera clear, pupils equal, oral mucosa moist 
Respiratory: Decreased BS bilaterally with scattered rhonchi and crackles Cardiovascular:  RRR without M,G,R 
Gastrointestinal: soft and non-tender; with positive bowel sounds. Musculoskeletal: warm without cyanosis. There is no lower leg edema. Skin:  Scattered ecchymosis on both arms, no jaundice or rashes, no wounds Neurologic: no gross neuro deficits Psychiatric:  alert and FC 
 
LINES: 
PICC RUE External catheter DRIPS:  None CXR:  
 
11/25: Interstitial edema with bilateral effusions CXR 11/21/18:   
 
 
LAB No results for input(s): GLUCPOC in the last 72 hours. No lab exists for component: [place] Recent Labs  
  11/25/18 
0122 11/24/18 
0333 11/23/18 
0457 WBC  --   --  8.0 HGB 10.0* 7.0* 7.3* HCT 31.7* 22.5* 25.5* PLT  --   --  263 Recent Labs  
  11/25/18 
0122 11/23/18 
0341  143 K 4.4 4.6  103 CO2 36* 29 * 155* BUN 23 18 CREA 0.88 0.76  
CA 7.9* 8.3 ALB  --  2.0*  
TBILI  --  0.3 ALT  --  23 SGOT  --  16 Recent Labs  
  11/25/18 
0345 11/22/18 1957 11/22/18 
1542 PHI 7.318* 7.330* 7.438 PCO2I 75.2* 66.2* 54.0*  
PO2I 89 84 59* HCO3I 38.6* 34.9* 36.5* No results for input(s): LCAD, LAC in the last 72 hours. No results for input(s): PH, PCO2, PO2, HCO3 in the last 72 hours. Assessment:  (Medical Decision Making) Hospital Problems  Date Reviewed: 11/23/2018 Codes Class Noted POA HCAP (healthcare-associated pneumonia) ICD-10-CM: J18.9 ICD-9-CM: 861  11/22/2018 Unknown Cx negative with no white count. Check PCT and consider stopping Vanc since there is a significant volume load with this and she appears to be in heart failure. Acute on chronic respiratory failure with hypoxia and hypercapnia (HCC) ICD-10-CM: J96.21, J96.22 
ICD-9-CM: 518.84, 786.09, 799.02  11/22/2018 Unknown On Air-Vo and wearing BIPAP at night. Not improving. Hospice was discussed last admission. May be time to revisit this. HTN (hypertension), malignant ICD-10-CM: I10 
ICD-9-CM: 401.0  11/21/2018 Yes Very high this AM  
 * (Principal) Sepsis ([facility] [address] ICD-10-CM: A41.9 ICD-9-CM: 038.9, 995.91  11/15/2018 Unknown Hemodynamically stable Metabolic encephalopathy BOH-36-LY: G93.41 
ICD-9-CM: 348.31  11/15/2018 Unknown Still with some confusion Diastolic heart failure (HCC) (Chronic) ICD-10-CM: I50.30 ICD-9-CM: 428.30  3/9/2013 Yes Chronic--does not appear to be volume overloaded Mixed dyslipidemia (Chronic) ICD-10-CM: T17.3 ICD-9-CM: 272.2  2/28/2013 Yes  
 chronic COPD, very severe ([facility] [address] (Chronic) ICD-10-CM: J44.9 ICD-9-CM: 721  2/28/2013 Yes FEV1 26% Chronic. Anemia- got 2U per hospitalists yesterday Plan:  (Medical Decision Making) Agree with diuresis; CXR wet this AM. Check BNP and PCT. Stop Vanc if PCT low. Recommend PC consult in AM; px appears to be very poor. More than 50% of the time documented was spent in face-to-face contact with the patient and in the care of the patient on the floor/unit where the patient is located.  
 
Jacqui Beckman MD

## 2018-11-25 NOTE — PROGRESS NOTES
Bedside rounding with Dr. Santosh Charles, updated on pt to include am BP and prn med's, increased dypsnea on exertion. No orders received.

## 2018-11-25 NOTE — PROGRESS NOTES
PCT remains very low. No evidence for infection at present. Will stop Vancomycin and Zosyn given volume/sodium loads and current evidence of heart failure.   
 
Patricia Hall MD

## 2018-11-25 NOTE — PROGRESS NOTES
Bedside rounding with Dr. Alfredo Young, updated on pt to include blood product with 20mg lasix in between, urine output, hypertension this am.

## 2018-11-25 NOTE — PROGRESS NOTES
Bedside shift change report given to [address] (oncoming nurse) by Augustus Kawasaki RN (offgoing nurse). Report included the following information SBAR, Kardex, ED Summary, Intake/Output, MAR, Recent Results, Alarm Parameters  and Quality Measures. Pt on 5 L NC complains of no pain.

## 2018-11-25 NOTE — PROGRESS NOTES
continued to care for patient with regular visits She was calm as she sat in chair Thanked her for our time together yesterday Assured her our support will be a constant part of her care as she goes forward She was thankful Mile Soto, staff Zulema [address]ernardo [address], [id number] [facility] Shay  /   [email or web address]

## 2018-11-26 PROBLEM — J18.9 HCAP (HEALTHCARE-ASSOCIATED PNEUMONIA): Status: RESOLVED | Noted: 2018-01-01 | Resolved: 2018-01-01

## 2018-11-26 PROBLEM — A41.9 SEPSIS (HCC): Status: RESOLVED | Noted: 2018-01-01 | Resolved: 2018-01-01

## 2018-11-26 PROBLEM — I10 HTN (HYPERTENSION), MALIGNANT: Chronic | Status: ACTIVE | Noted: 2018-01-01

## 2018-11-26 NOTE — PROGRESS NOTES
Bedside report given to Evert Velez RN with dual neuro assessment. Pt arouses to name, denies needs at this time.

## 2018-11-26 NOTE — PROGRESS NOTES
Patient received to room 812 from icu  Alert and oriented  Dual skin assessment with Maya Liu RN  Patient with multiple bruises  No broken skin  Pink foam to sacrum

## 2018-11-26 NOTE — PROGRESS NOTES
Physical Therapy Note: 
 
Participated in interdisciplinary rounds in ICU/CCU and chart reviewed. Patient is experiencing decrease in function from baseline. Patient would benefit from skilled acute therapy to increase independence with self care/ADLs, strength, endurance, and functional mobility. Recommend PT/OT consult when medically stable and MD agrees.  
 
Thank you for your consideration, 
Poornima Yoon, PT, DPT

## 2018-11-26 NOTE — PROGRESS NOTES
Problem: Mobility Impaired (Adult and Pediatric) Goal: *Acute Goals and Plan of Care (Insert Text) LTG: 
(1.)Ms. Tamika Rust will move from supine to sit and sit to supine , scoot up and down and roll side to side with SUPERVISION within 7 treatment day(s). (2.)Ms. Tamika Rust will transfer from bed to chair and chair to bed with CONTACT GUARD ASSIST using the least restrictive device within 7 treatment day(s). (3.)Ms. Tamika Rust will ambulate with CONTACT GUARD ASSIST for 100+ feet with the least restrictive device within 7 treatment day(s). (4.)Ms. Tamika Rust will perform exercises per HEP for 15+ minutes to improve strength and mobility within 7 days. ________________________________________________________________________________________________ PHYSICAL THERAPY: Initial Assessment, PM 11/26/2018INPATIENT: Hospital Day: 6 [place] / Product Type: Medicare /  
  
NAME/AGE/GENDER: Loly Cummings is a 77 y.o. female PRIMARY DIAGNOSIS: HTN (hypertension), malignant Acute on chronic respiratory failure with hypoxia and hypercapnia (HCC) Acute on chronic respiratory failure with hypoxia and hypercapnia (HCC) ICD-10: Treatment Diagnosis:  
 · Generalized Muscle Weakness (M62.81) · Difficulty in walking, Not elsewhere classified (R26.2) · Other abnormalities of gait and mobility (R26.89) Precaution/Allergies: 
Demerol [meperidine]; Gabapentin; Lortab [hydrocodone-acetaminophen]; and Statins-hmg-coa reductase inhibitors ASSESSMENT:  
Ms. Tamika Rust is a 77year old female admitted from rehab for malignant hypertension, respiratory failure. She lives with  and is ambulatory with rollator at baseline. Presents sitting up in bed in ICU without specific complaints. Intermittent confusion noted. Agreeable to therapy assessment and denies pain. Pt performs bed mobility with min A. Intact seated balance noted at edge of bed.  LE assessment shows AROM [facility] bilaterally with strength generally decreased at 4-/5. Sensation intact to light touch. Pt performs sit-stand transfer with verbal cues and min handheld assist. Able to take a few steps from bed to chair with min A and forward flexed, shuffled gait. Positioned comfortably in chair for rest break, O2 sats 90-91%. Then performs LE exercises with min cueing and good participation. Braulio Long is functioning well below baseline due to her second hospitalization and decreased activity. Will benefit from continued PT during acute care stay to address deficits/maximize independence with mobility. Anticipate return to rehab at discharge- spouse in agreement. This section established at most recent assessment PROBLEM LIST (Impairments causing functional limitations): 1. Decreased Strength 2. Decreased Transfer Abilities 3. Decreased Ambulation Ability/Technique 4. Decreased Balance 5. Decreased Activity Tolerance 6. Increased Shortness of Breath 7. Decreased Cognition INTERVENTIONS PLANNED: (Benefits and precautions of physical therapy have been discussed with the patient.) 1. Balance Exercise 2. Bed Mobility 3. Gait Training 4. Home Exercise Program (HEP) 5. Therapeutic Activites 6. Therapeutic Exercise/Strengthening 7. Transfer Training TREATMENT PLAN: Frequency/Duration: 3 times a week for duration of hospital stay Rehabilitation Potential For Stated Goals: Good RECOMMENDED REHABILITATION/EQUIPMENT: (at time of discharge pending progress): Due to the probability of continued deficits (see above) this patient will likely need continued skilled physical therapy after discharge. Equipment:  
? tbd at rehab HISTORY:  
History of Present Injury/Illness (Reason for Referral): 
Per H&P, \"Patient is a 77 y.o. female who presents to the ER from the SNF/rehab that she was discharged to approximately 3-4 hours ago.    She was just hospitalized from 11/15-11/21 for acute metabolic encephalopathy and sepsis. She was stable at the time of discharge. Apparently, upon arrival to SNF, her SBP was over 200 and her RR was elevated with wheezing. They told EMS that they would not accept patient and to send her back to the ER. Pt is a poor historian and does not report any changes in symptoms since this morning. She has end stage COPD and is always wheezing and SOB to some degree. She c/o only feeling \"hot\" Past Medical History/Comorbidities:  
Ms. Hitesh Norman  has a past medical history of Abnormal chest CT, Acute respiratory failure ([address] Anemia, Arrhythmia, Breast cancer ([facility] [address] CAD (coronary artery disease), Cancer ([address] Carotid stenosis, Cholelithiasis, COPD exacerbation ([address] COPD, frequent exacerbations ([address] Coronary atherosclerosis, CVD (cerebrovascular disease), unspecified, RBBB, Rheumatic aortic stenosis, S/P AV[facility], Systolic murmur, and Unspecified adverse effect of anesthesia. Ms. Hitesh Norman  has a past surgical history that includes pr cardiac surg procedure unlist (2/8/2012); hx carotid endarterectomy (12/8/2008); hx vascular access (1/2013); hx breast lumpectomy (6/2012 8/2012); hx lap cholecystectomy (09/2010); hx hysterectomy (1969; 2008); hx aortic valve replacement (2/28/13); hx carotid endarterectomy (Left, 10/2016); AORTIC VALVE REPLACEMENT (AVR) (N/A, 2/28/2013); and ESOPHAGEAL TRANS ECHOCARDIOGRAM (N/A, 2/28/2013). Social History/Living Environment:  
Home Environment: Rehabilitation facility One/Two Story Residence: Two story Living Alone: No 
Support Systems: Spouse/Significant Other/Partner, Skilled nursing facility Patient Expects to be Discharged to[de-identified] Rehabilitation facility Current DME Used/Available at Home: [employer] [place] Prior Level of Function/Work/Activity: 
Lives with  at baseline. Was admitted from rehab. Typically ambulatory with rollator. Number of Personal Factors/Comorbidities that affect the Plan of Care: 1-2: MODERATE COMPLEXITY EXAMINATION:  
Most Recent Physical Functioning:  
Gross Assessment: 
AROM: Within functional limits Strength: Generally decreased, functional 
Coordination: Generally decreased, functional 
Sensation: Intact Posture: 
Posture (WDL): Exceptions to [facility] Posture Assessment: Forward head, Rounded shoulders Balance: 
Sitting: Intact Standing: Impaired Standing - Static: Fair Standing - Dynamic : Fair Bed Mobility: 
Supine to Sit: Minimum assistance Scooting: Contact guard assistance Wheelchair Mobility: 
  
Transfers: 
Sit to Stand: Minimum assistance Stand to Sit: Minimum assistance Bed to Chair: Minimum assistance Interventions: Verbal cues; Safety awareness training Duration: 10 Minutes Gait: 
  
Base of Support: Center of gravity altered;Narrowed Speed/Rachel: Slow;Shuffled Step Length: Left shortened;Right shortened Gait Abnormalities: Decreased step clearance;Shuffling gait Distance (ft): 4 Feet (ft) Assistive Device: Other (comment)(handheld assist of 1) Ambulation - Level of Assistance: Minimal assistance Interventions: Verbal cues; Visual/Demos; Safety awareness training; Tactile cues Body Structures Involved: 1. Muscles Body Functions Affected: 1. Mental 
2. Respiratory 3. Movement Related Activities and Participation Affected: 1. General Tasks and Demands 2. Mobility 3. Domestic Life 4. Community, Social and [place] Number of elements that affect the Plan of Care: 4+: HIGH COMPLEXITY CLINICAL PRESENTATION:  
Presentation: Stable and uncomplicated: LOW COMPLEXITY CLINICAL DECISION MAKING:  
[employer] AM-PAC 6 Clicks Basic Mobility Inpatient Short Form How much difficulty does the patient currently have. .. Unable A Lot A Little None 1. Turning over in bed (including adjusting bedclothes, sheets and blankets)?    [] 1   [] 2   [x] 3   [] 4  
 2.  Sitting down on and standing up from a chair with arms ( e.g., wheelchair, bedside commode, etc.)   [] 1   [] 2   [x] 3   [] 4  
3. Moving from lying on back to sitting on the side of the bed? [] 1   [] 2   [x] 3   [] 4 How much help from another person does the patient currently need. .. Total A Lot A Little None 4. Moving to and from a bed to a chair (including a wheelchair)? [] 1   [] 2   [x] 3   [] 4  
5. Need to walk in hospital room? [] 1   [x] 2   [] 3   [] 4  
6. Climbing 3-5 steps with a railing? [x] 1   [] 2   [] 3   [] 4  
© 2007, Trustees of [facility] [employer], under license to [employer]. All rights reserved Score:  Initial 15 Most Recent: X (Date: -- ) Interpretation of Tool:  Represents activities that are increasingly more difficult (i.e. Bed mobility, Transfers, Gait). Score 24 23 22-20 19-15 14-10 9-7 6 Modifier CH CI CJ CK CL CM CN   
 
? Mobility - Walking and Moving Around:  
  - CURRENT STATUS: CK - 40%-59% impaired, limited or restricted  - GOAL STATUS: CJ - 20%-39% impaired, limited or restricted  - D/C STATUS:  ---------------To be determined--------------- [place] / [place] B / Product Type: Medicare /   
 
Medical Necessity:    
· Patient demonstrates good rehab potential due to higher previous functional level. Reason for Services/Other Comments: 
· Patient continues to demonstrate capacity to improve strength, mobility, balance, transfers, gait which will increase independence, decrease amount of assistance required from caregiver and increase safety. Use of outcome tool(s) and clinical judgement create a POC that gives a: Clear prediction of patient's progress: LOW COMPLEXITY  
  
 
 
 
TREATMENT:  
(In addition to Assessment/Re-Assessment sessions the following treatments were rendered) Pre-treatment Symptoms/Complaints:  \"thank you\" Pain: Initial:  
Pain Intensity 1: 0  Post Session:  0/10 Therapeutic Activity: (  10 Minutes ):  Therapeutic activities including Bed transfers, Chair transfers, Ambulation on level ground and exercises to improve mobility, strength, balance and coordination. Required minimal Verbal cues; Visual/Demos; Safety awareness training; Tactile cues to promote static and dynamic balance in standing and to address exercise form. Date: 
11/26/18 Date: 
 Date: Activity/Exercise Parameters Parameters Parameters LAQ 6x AB Seated marching 6x AB Ankle pumps 6x AB Seated hip aBd 6x AB Braces/Orthotics/Lines/Etc:  
· ICU monitors · O2 Device: Nasal cannula Treatment/Session Assessment:   
· Response to Treatment:  Pt performs mobility with min A to chair · Interdisciplinary Collaboration:  
o Physical Therapist 
o Registered Nurse · After treatment position/precautions:  
o Up in chair 
o Bed/Chair-wheels locked 
o Bed in low position 
o Call light within reach 
o RN notified 
o Family at bedside · Compliance with Program/Exercises: Compliant all of the time · Recommendations/Intent for next treatment session: \"Next visit will focus on advancements to more challenging activities and reduction in assistance provided\". Total Treatment Duration: PT Patient Time In/Time Out Time In: 1410 Time Out: 1440 Melissa Irene DPT

## 2018-11-26 NOTE — PROGRESS NOTES
Bedside, verbal, and written report received from Rolly Royal, Count includes the [facility]. Patient resting in bed, alert & oriented x 4. Off BIPAP to 2 L nasal cannula.

## 2018-11-26 NOTE — PROGRESS NOTES
Progress Note Patient: Van Gaxiola MRN: [id number]  SSN: [id number] YOB: 1940  Age: 77 y.o. Sex: female Admit Date: 11/21/2018 LOS: 5 days Subjective:  
F/U hypoxia, acute metabolic encephalopathy Patient discharged 11/21 after was admitted for sepsis of unknown infectious source, hyponatremia, anemia s/p 1 unit PRBC, and COPD exacerbation. Patient was treated with zosyn and vancomycin for 6 days during that time. No signs of pneumonia and repeat blood cultures negative from original blood cultures that were thought to be contaminate. After being discharged, SNF found patient to have BP over 200 and had worsening wheezing. Patient admitted again for malignant HTN on 11/22. Around 7:22AM on day of admission, patient found to now have lower BP with MAP 68 and oxygen saturations 64%. Patient had taken off her nasal cannula stating \"she does not need oxygen. \" When placed back on 4L NC oxygen saturations improved to 98%. Chest x ray showed Interstitial densities in the right lung with suspected developing consolidation or edema in the right lung base. Patient placed on BiPAP for hypercarbia. Attempted to wean from BiPAP but patient would become dyspneic. Transferred to ICU for closer monitoring. Currently on High flow NC 2LNC. Vitals show BP is increasing. Hg 9.5 from 10. Blood cultures have no growth. UA negative. Less SOB. Current Facility-Administered Medications Medication Dose Route Frequency  ferrous sulfate tablet 325 mg  1 Tab Oral BID WITH MEALS  lisinopril (PRINIVIL, ZESTRIL) tablet 10 mg  10 mg Oral DAILY  0.9% sodium chloride infusion 250 mL  250 mL IntraVENous PRN  
 diphenhydrAMINE (BENADRYL) injection 25 mg  25 mg IntraVENous Q4H PRN  
 methylPREDNISolone (PF) (SOLU-MEDROL) injection 20 mg  20 mg IntraVENous Q12H  
 tiotropium (SPIRIVA) inhalation capsule 18 mcg  1 Cap Inhalation DAILY  sodium chloride (NS) flush 20 mL  20 mL InterCATHeter Q8H  
 heparin (porcine) pf 600 Units  600 Units InterCATHeter Q8H  
 sodium chloride (NS) flush 20 mL  20 mL InterCATHeter PRN  
 heparin (porcine) pf 600 Units  600 Units InterCATHeter PRN  
 metoprolol succinate (TOPROL-XL) XL tablet 50 mg  50 mg Oral DAILY  pantoprazole (PROTONIX) tablet 40 mg  40 mg Oral DAILY  sodium chloride (NS) flush 5-10 mL  5-10 mL IntraVENous PRN  
 albuterol (PROVENTIL VENTOLIN) nebulizer solution 2.5 mg  2.5 mg Nebulization Q4H RT  
 aspirin delayed-release tablet 81 mg  81 mg Oral DAILY  budesonide (PULMICORT) 500 mcg/2 ml nebulizer suspension  500 mcg Nebulization BID RT  
 gabapentin (NEURONTIN) capsule 100 mg  100 mg Oral QHS  nicotine (NICODERM CQ) 14 mg/24 hr patch 1 Patch  1 Patch TransDERmal QHS  sertraline (ZOLOFT) tablet 100 mg  100 mg Oral DAILY  haloperidol lactate (HALDOL) injection 2 mg  2 mg IntraVENous Q4H PRN  
 hydrALAZINE (APRESOLINE) 20 mg/mL injection 20 mg  20 mg IntraVENous Q6H PRN  
 sodium chloride (NS) flush 5-10 mL  5-10 mL IntraVENous Q8H  
 sodium chloride (NS) flush 5-10 mL  5-10 mL IntraVENous PRN  
 acetaminophen (TYLENOL) tablet 650 mg  650 mg Oral Q4H PRN  
 HYDROcodone-acetaminophen (NORCO)  mg tablet 1 Tab  1 Tab Oral Q4H PRN  
 naloxone (NARCAN) injection 0.4 mg  0.4 mg IntraVENous PRN  
 diphenhydrAMINE (BENADRYL) capsule 25 mg  25 mg Oral Q4H PRN  
 bisacodyl (DULCOLAX) tablet 5 mg  5 mg Oral DAILY PRN  
 LORazepam (ATIVAN) tablet 1 mg  1 mg Oral BID PRN  
 ondansetron (ZOFRAN) injection 4 mg  4 mg IntraVENous Q4H PRN Objective:  
 
Vitals:  
 11/26/18 1127 11/26/18 1136 11/26/18 1201 11/26/18 1301 BP:   160/65 160/71 Pulse: 93  95 94 Resp: 28  29 (!) 40 Temp:   98.3 °F (36.8 °C) SpO2: 96% 96% 97% 95% Weight:      
Height:      
  
  
Intake and Output: 
Current Shift: 11/26 0701 - 11/26 1900 In: 300 [P.O.:300] Out: - Last three shifts: 11/24 1901 - 11/26 0700 In: 740 [P.O.:60; I.V.:400] Out: 4425 [VPOUR:3601] Physical Exam:  
General:  Able to answer more questions appropriately Eyes:  Conjunctivae/corneas clear Ears:  Normal TMs and external ear canals both ears. Nose: Nares normal. Septum midline. Mouth/Throat: Lips, mucosa, and tongue normal.   
Neck: no JVD. Back:   No lesions noted Lungs:   CTA bilaterally Heart:  Blowing systolic murmur best heard at right 2nd intercostal space Abdomen:   Soft, non-tender. Bowel sounds normal. No masses,  No organomegaly. Trace edema to UE bilaterally Pulses: 2+ and symmetric all extremities. Skin: Skin color, texture, turgor normal. No rashes or lesions Lymph nodes: Cervical, supraclavicular, and axillary nodes normal.  
Neurologic: More alert compared to yesterday. Lab/Data Review: 
 
Recent Results (from the past 24 hour(s)) HGB & HCT Collection Time: 11/26/18  3:22 AM  
Result Value Ref Range HGB 9.5 (L) 11.7 - 15.4 g/dL HCT 31.1 (L) 35.8 - 46.3 % Assessment/ Plan:  
 
Principal Problem: 
  Acute on chronic respiratory failure with hypoxia and hypercapnia ([address] (11/22/2018) Active Problems: 
  Mixed dyslipidemia (2/28/2013) COPD, very severe ([address] (2/28/2013) Overview: FEV1 26% Diastolic heart failure ([address] (3/9/2013) Metabolic encephalopathy (40/37/0133) HTN (hypertension), malignant (11/21/2018) Anemia (11/24/2018) Stopped antibiotics today. Low pro calcitonin and no leukocytosis Acute metabolic encephalopathy likely from episode of hypoxia. Her encephalopathy has improved. Satting well on high flow. Pulmonology following for further recommendations. Albuterol q4hr. Pulmicort BID. Solumedrol 20mg q12hr. Overall slowly  Improving but very limited due to the severity of her disease. PC to see today. Possible hospice candidate. 2017 ECHO showed severe diastolic dysfunction with EF 71%. Monitor for signs of fluid overload. Emphysema - At end stages. Pulmonology following to which I appreciate. palliative care consult. HTN- improved s/p hydralazine. Will increase BB dose Anemia shows early iron deficiency anemia. Will start Fe BID. Hg improved today. Continue lisinopril 5mg daily and metoprolol succinate increased. DVT prophylaxis - Hold Lovenox. SCDs Signed By: Jordan Mariano DO November 26, 2018

## 2018-11-26 NOTE — CDMP QUERY
Please clarify if this patient is being treated/managed for: 
 
ACUTE DIASTOLIC CHF in the setting of acute pulmonary edema and hx of diastolic CHF being treated with IV lasix 20 mg x 2, strict I&O's 
 
=>Other Explanation of clinical findings =>Unable to Determine (no explanation of clinical findings) The medical record reflects the following: 
 
Risk Factors: Hx of diastolic CHF, 76 yo, Increased fluid intake d/t IV ABX Vanc and Zosyn, Chronic respiratory failure Clinical Indicators: CXR with pulmonary edema, , Working to breathe requiring BiPAP at sleep, Lung sounds w/rhonchi and crackles, \"appears to be in heart failure\" and \"acute pulmonary edema\" per MD 
 
Treatment: IV lasix 20 mg x 2, strict I&O's 
 
 
Please clarify and document your clinical opinion in the progress notes and discharge summary including the definitive and/or presumptive diagnosis, (suspected or probable), related to the above clinical findings. Please include clinical findings supporting your diagnosis. Thanks, CORINA PortilloN, RN, CDS Compliant Documentation Management Program 
[phone]

## 2018-11-26 NOTE — PROGRESS NOTES
Chart review for home NIPPV: 
  
Patient has ABG with PaCO2 of 76.5 was placed on bi-level with  improvement in pCO2 down to 59.3 
  
CRYSTAL and CPAP treatment has been considered and ruled out. Due to severity of COPD which is the primary cause of Chronic Respiratory Failure and hypercapnia Bi-level therapy is sufficiently treating hypercapnia at this time . Patient would benefit from home Respiratory Assist Device  Bilevel unit.  
   
Will need Sleep Oximetry on 2 lpm oxygen or home dose which ever is greater, only the night prior to discharge to qualify for home NIPPV (RAD/Bi-level unit). Patient'e ZULMA Martinez

## 2018-11-26 NOTE — CONSULTS
Palliative Care Patient: Braulio Long MRN: [id number]  SSN: [id number] YOB: 1940  Age: 77 y.o. Sex: female Date of Request: 11/25/18 Date of Consult:  11/26/2018 Reason for Consult:  advanced care plan planning/end of life Requesting Physician: Dr. Nika Morales Assessment/Plan:  
 
Principal Diagnosis: Dyspnea  R06.00 Additional Diagnoses: · Altered Mental Status R41.82 
· Debility, Unspecified  R53.81 · Fatigue, Lethargy  R53.83 
· Frailty  R54 · Counseling, Encounter for Medical Advice  Z71.9 
· Encounter for Palliative Care  Z51.5 Palliative Performance Scale (PPS): PPS: 40 Medical Decision Making:  
Reviewed and summarized notes from admission to present. Discussed case with appropriate providers. Reviewed laboratory and x-ray data from admission to present. Pt resting in bed,  at bedside. Pt denies pain, N/V, coughing, and other discomforts. I introduced self and role of palliative care, and asked pt to share with me her understanding of her illness. Ms. Yoseph Bhatt stated that she understands that her \"lungs are in bad shape\". When I asked her about her expectations concerning her illness, she talked about how independent she has always been, and how she has to continue to prove to herself that she can remain independent. We discussed the difficulty of accepting assistance, especially when we've been so independent. We talked about the value of PT/OT in helping her stay as independent as possible for as long as possible. The pt seemed agreeable to this idea, and said that, although she does not want to go to rehab, she will do so because her  wants her to. We discussed that rehab can continue once she returns about, by using home health's PT/OT services. I raised the subject of hospice for the future. Ms. Yoseph Bhatt said that hospice tries to kill people, by giving morphine.   I explained the use of morphine for dyspnea, which the pt seemed to accept. When I suggested the value of planning for the future as her disease progresses, Ms. Barbar Nissen said that she didn't want to think about it, that she just wants to keep going, like everything is OK, even though she knows it is not. When Ms. Barbar Nissen talked about how much longer she might live, her  asked, Jonh Annika are you asking about that?\", with clear discomfort. However, Ms. Barbar Nissen was very clear that she does not want an attempt at resuscitation. At her request, I have changed her code status to DNR. Will continue to follow intermittently, to see if the patient or her  have any other questions or concerns related to the above goals. Will discuss findings with members of the interdisciplinary team.   
 
Thank you for this referral.    
 
  
. 
 
Subjective:  
 
History obtained from:  Patient, Family and Chart Chief Complaint: dyspnea History of Present Illness:  Ms. Barbar Nissen is a 78 yo F with a PMH of CAD s/p AVR, end stage COPD, and other conditions listed below who was discharged on 11/21 after sepsis of unknown origin  and AMS, then re-admitted later the same day from SNF/rehab, who would not accept her due to SBP over 200 and elevated RR with wheezing. Placed on BiPAP due to hypercarbia, then transferred to ICU on 11/22 for closer monitoring. Was able to use Airvo starting 11/24, but had to return to BiPAP on 11/25 due to shallow breathing. Found to have sepsis secondary to HCAP and pulmonary edema. Advance Directive: No      
Code Status:  Full Code Health Care Power of : No - Patient does not have a [address] The  is the health care surrogate. Past Medical History:  
Diagnosis Date  Abnormal chest CT 1/10/2013 IMPRESSION: 1. No evidence of central or segmental pulmonary embolism.  2. Scattered reticulonodular opacities within the lungs with a tree-in-bud type pattern most commonly is due to an infectious process, usually bacterial. Atypical infection including mycobacterial infections cannot be excluded. Metastatic disease is felt to be less likely. Short interval followup imaging may be beneficial for r  Acute respiratory failure ([facility] 10/2016  
 required intubation at [facility]  Anemia ANEMIA /GI BLEED 1- PRBC 2 UNITS  Arrhythmia HX SVT  Breast cancer ([facility] 1/2/2013 Right with lumpectomies x2 and radiation for 21 treatments 6/12.  CAD (coronary artery disease) aortic valve stenosis pending repair/replacement  Cancer ([facility] 6/2012 BREAST CANCER RIGHT  Carotid stenosis 1/2/2013 S/p R CEA 2008 (Dr. Leodan Javed)  Cholelithiasis 1/2/2013  COPD exacerbation ([facility] 1/3/2013  COPD, frequent exacerbations ([facility] 10/12/2018  Coronary atherosclerosis 8/19/2013  CVD (cerebrovascular disease), unspecified 9/15/2016  RBBB 1/3/2013  Rheumatic aortic stenosis 1/3/2013  
 2/28/13 (Dr. Ananth Segura) AVR 19mm [facility] Ease   SP 19mm kulkarni bioprosthetic AVR   
 S/P AVR (aortic valve replacement)-2/28/13 2/28/2013  Systolic murmur 8/2/2596  Unspecified adverse effect of anesthesia OVERSEDATION, SEVERE HYPOTENSION Past Surgical History:  
Procedure Laterality Date  CARDIAC SURG PROCEDURE UNLIST  2/8/2012  HX AORTIC VALVE REPLACEMENT  2/28/13 Dr. Ananth Segura  HX BREAST LUMPECTOMY  6/2012 8/2012 RIGHT  HX CAROTID ENDARTERECTOMY  12/8/2008 RIGHT  HX CAROTID ENDARTERECTOMY Left 10/2016 Dr. Tamiko Lopez at [facility]; 2008  HX LAP CHOLECYSTECTOMY  09/2010  HX VASCULAR ACCESS  1/2013  
 pic line placed and removed Family History Problem Relation Age of Onset  Stroke Mother  Lung Disease Father   
     Delene Chalet  Hypertension Other Social History Tobacco Use  Smoking status: Current Every Day Smoker Packs/day: 0.25 Years: 50.00 Pack years: 12.50 Types: Cigarettes  Smokeless tobacco: Never Used  Tobacco comment: Pt states she smokes about 8 a day. Substance Use Topics  Alcohol use: No  
  Alcohol/week: 0.0 oz  
 
Prior to Admission medications Medication Sig Start Date End Date Taking? Authorizing Provider  
nicotine (NICODERM CQ) 14 mg/24 hr patch 1 Patch by TransDERmal route every twenty-four (24) hours for 30 days. 11/21/18 12/21/18  Carmen Mera DO  
OXYGEN-AIR DELIVERY SYSTEMS 4 L by Nasal route continuous. 10/18/18   Provider, Historical  
albuterol (PROVENTIL HFA, VENTOLIN HFA, PROAIR HFA) 90 mcg/actuation inhaler Take 2 Puffs by inhalation every four (4) hours as needed for Wheezing. 10/15/18   Anthony Austin NP  
albuterol-ipratropium (DUO-NEB) 2.5 mg-0.5 mg/3 ml nebu 3 mL by Nebulization route four (4) times daily. 9/10/18   Anthony Austin NP  
budesonide (PULMICORT) 0.5 mg/2 mL nbsp 2 mL by Nebulization route two (2) times a day. 9/10/18   Anthony Austin NP  
gabapentin (NEURONTIN) 100 mg capsule Take 1 Cap by mouth nightly. 8/17/18   Param Salguero MD  
sertraline (ZOLOFT) 100 mg tablet Take 1 Tab by mouth daily. 8/17/18   Param Salguero MD  
metoprolol succinate (TOPROL-XL) 50 mg XL tablet Take 1 Tab by mouth daily. 2/5/18   Willie Martins MD  
aspirin delayed-release 81 mg tablet Take 1 Tab by mouth daily. 1/12/13   Xiao Mead PA Allergies Allergen Reactions  Demerol [Meperidine] Other (comments) Low blood pressure  Gabapentin Other (comments) Heart Racing Pt states that she got her \"dosage lowered by the doctor and is able to tolerate 100 mg of gabapentin now\", prior she was receiving 300mg.  Lortab [Hydrocodone-Acetaminophen] Swelling HYPOTENSION 
  
 Statins-Hmg-Coa Reductase Inhibitors Unknown (comments) Review of Systems: A comprehensive review of systems was negative except for: Respiratory: Positive for dyspnea. Objective: Visit Vitals /71 Pulse 94 Temp 98.3 °F (36.8 °C) Resp (!) 40 Ht 5' 4\" (1.626 m) Wt 122 lb 9.2 oz (55.6 kg) SpO2 95% BMI 21.04 kg/m² Physical Exam: 
 
General:  Cooperative. No acute distress. Eyes:  Conjunctivae/corneas clear Nose: Nares normal. Septum midline. Neck: Supple, symmetrical, trachea midline, no JVD Lungs:   Crackles bilaterally, unlabored Heart:  Regular rate and rhythm, no murmur Abdomen:   Soft, non-tender, non-distended Extremities: Normal, atraumatic, no cyanosis or edema Skin: Skin color, texture, turgor normal. No rash or lesions. Neurologic: Nonfocal  
Psych: Alert and oriented Assessment:  
 
Hospital Problems  Date Reviewed: 11/23/2018 Codes Class Noted POA Anemia ICD-10-CM: D64.9 ICD-9-CM: 285.9  11/24/2018 Yes * (Principal) Acute on chronic respiratory failure with hypoxia and hypercapnia (HCC) ICD-10-CM: J96.21, J96.22 
ICD-9-CM: 518.84, 786.09, 799.02  11/22/2018 Yes HTN (hypertension), malignant (Chronic) ICD-10-CM: I10 
ICD-9-CM: 401.0  11/21/2018 Yes Metabolic encephalopathy SUHAIL-86-ED: G93.41 
ICD-9-CM: 348.31  11/15/2018 Yes Diastolic heart failure (HCC) (Chronic) ICD-10-CM: I50.30 ICD-9-CM: 428.30  3/9/2013 Yes Mixed dyslipidemia (Chronic) ICD-10-CM: R57.4 ICD-9-CM: 272.2  2/28/2013 Yes COPD, very severe ([address] (Chronic) ICD-10-CM: J44.9 ICD-9-CM: 858  2/28/2013 Yes Overview Addendum 11/30/2017  9:09 AM by Debra Barthel, NP  
  FEV1 26% Signed By: Yumiko Coffman NP November 26, 2018

## 2018-11-26 NOTE — INTERDISCIPLINARY ROUNDS
Interdisciplinary team rounds were held 11/26/2018 with the following team members:Care Management, Nursing, Nurse Practitioner, Nutrition, Palliative Care, Pastoral Care, Patient Relations, Pharmacy, Physical Therapy, Physician, Physician's Assistant, Respiratory Therapy and Clinical Coordinator and the patient. Plan of care discussed. See clinical pathway and/or care plan for interventions and desired outcomes.

## 2018-11-26 NOTE — PROGRESS NOTES
TRANSFER - OUT REPORT: 
 
Verbal report given to [facility], RN (name) on Elo Gutierrez  being transferred to [phone] (unit) for routine progression of care Report consisted of patients Situation, Background, Assessment and  
Recommendations(SBAR). Information from the following report(s) SBAR, Kardex, ED Summary, Procedure Summary, Intake/Output, MAR, Recent Results, Med Rec Status and Cardiac Rhythm NSR with BBB was reviewed with the receiving nurse. Lines: PICC Double Lumen 11/24/18 Right;Brachial (Active) Central Line Being Utilized Yes 11/26/2018 12:04 PM  
Criteria for Appropriate Use Hemodynamically unstable, requiring monitoring lines, vasopressors, or volume resuscitation 11/26/2018 12:04 PM  
Site Assessment Clean, dry, & intact 11/26/2018 12:04 PM  
Phlebitis Assessment 0 11/26/2018 12:04 PM  
Infiltration Assessment 0 11/26/2018 12:04 PM  
Arm Circumference (cm) 22 cm 11/26/2018 12:04 PM  
Date of Last Dressing Change 11/24/18 11/26/2018 12:04 PM  
Dressing Status Clean, dry, & intact 11/26/2018 12:04 PM  
Dressing Type Disk with Chlorhexadine gluconate (CHG); Transparent;Stabilization/securement device 11/26/2018 12:04 PM  
Hub Color/Line Status Patent; Flushed 11/26/2018 12:04 PM  
Positive Blood Return (Site #1) Yes 11/26/2018 12:04 PM  
Hub Color/Line Status Patent; Flushed 11/26/2018 12:04 PM  
Positive Blood Return (Site #2) Yes 11/26/2018 12:04 PM  
Alcohol Cap Used No 11/26/2018  7:27 AM  
   
Peripheral IV 11/23/18 Left Wrist (Active) Site Assessment Clean, dry, & intact 11/26/2018  7:27 AM  
Phlebitis Assessment 0 11/26/2018  7:27 AM  
Infiltration Assessment 0 11/26/2018  7:27 AM  
Dressing Status Clean, dry, & intact 11/26/2018  7:27 AM  
Dressing Type Tape;Transparent 11/26/2018  7:27 AM  
Hub Color/Line Status Flushed;Patent 11/26/2018  7:27 AM  
Alcohol Cap Used Yes 11/26/2018  7:27 AM  
  
 
Opportunity for questions and clarification was provided. Patient transported with: 
 O2 @ 2 liters

## 2018-11-26 NOTE — PROGRESS NOTES
Critical Care Outreach Nurse Progress Report: 
 
Subjective: In to assess pt secondary to transfer from Unit. MEWS Score: 2 (11/26/18 1617) Vitals:  
 11/26/18 1544 11/26/18 1617 11/26/18 1703 11/26/18 1709 BP:  146/63 151/66 Pulse:  93 100 97 Resp:  22 28 27 Temp:  98.5 °F (36.9 °C) SpO2: 92% 91% 92% 92% Weight:      
Height:      
  
 
Objective: Pt is awake and alert. Assisted with pt transfer to new room. Pain Intensity 1: 0 (11/26/18 1617) Patient Stated Pain Goal: 0 Assessment: Pt is on O2 2 liters [place] NC with sat of 96%. HR=93. Pt's gait is unsteady with transfer to bed from wheelchair. BIPAP at bedside. New staff at bedside. Plan: Will follow per protocol.

## 2018-11-26 NOTE — PROGRESS NOTES
Pt has been compliant with wearing her BiPap mask. Settings remain: 12/6, rate of 8/min @ 35% Fi02.  
900cc light ophelia UOP via Zkatter.

## 2018-11-26 NOTE — PROGRESS NOTES
TRANSFER - IN REPORT: 
 
Verbal report received from Chantale(name) on Dionne Bye  being received from ICU(unit) for routine progression of care Report consisted of patients Situation, Background, Assessment and  
Recommendations(SBAR). Information from the following report(s) SBAR was reviewed with the receiving nurse. Opportunity for questions and clarification was provided. Assessment completed upon patients arrival to unit and care assumed.

## 2018-11-26 NOTE — PROGRESS NOTES
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 Somnolent and barely responds to sternal rub. Poor air movement, cannot tell if COPD is exacerbated, but there is some concern for a possible RLL HAP and on vanc and zosyn for that. On 11/22 we were consulted and she was moved to the ICU for BiPAP. CXR with edema and diuretics given. Abx stopped as no evidence for infection. Subjective: Tolerated O2 weaning. Continued to wax and wane. Watching the Price is Right. Tells me she has to have B/Ps taken in her right arm Current Facility-Administered Medications Medication Dose Route Frequency  ferrous sulfate tablet 325 mg  1 Tab Oral BID WITH MEALS  lisinopril (PRINIVIL, ZESTRIL) tablet 10 mg  10 mg Oral DAILY  0.9% sodium chloride infusion 250 mL  250 mL IntraVENous PRN  
 diphenhydrAMINE (BENADRYL) injection 25 mg  25 mg IntraVENous Q4H PRN  
 methylPREDNISolone (PF) (SOLU-MEDROL) injection 20 mg  20 mg IntraVENous Q12H  
 tiotropium (SPIRIVA) inhalation capsule 18 mcg  1 Cap Inhalation DAILY  sodium chloride (NS) flush 20 mL  20 mL InterCATHeter Q8H  
 heparin (porcine) pf 600 Units  600 Units InterCATHeter Q8H  
 sodium chloride (NS) flush 20 mL  20 mL InterCATHeter PRN  
 heparin (porcine) pf 600 Units  600 Units InterCATHeter PRN  
 metoprolol succinate (TOPROL-XL) XL tablet 50 mg  50 mg Oral DAILY  pantoprazole (PROTONIX) tablet 40 mg  40 mg Oral DAILY  sodium chloride (NS) flush 5-10 mL  5-10 mL IntraVENous PRN  
 albuterol (PROVENTIL VENTOLIN) nebulizer solution 2.5 mg  2.5 mg Nebulization Q4H RT  
 aspirin delayed-release tablet 81 mg  81 mg Oral DAILY  budesonide (PULMICORT) 500 mcg/2 ml nebulizer suspension  500 mcg Nebulization BID RT  
 gabapentin (NEURONTIN) capsule 100 mg  100 mg Oral QHS  nicotine (NICODERM CQ) 14 mg/24 hr patch 1 Patch  1 Patch TransDERmal QHS  sertraline (ZOLOFT) tablet 100 mg  100 mg Oral DAILY  haloperidol lactate (HALDOL) injection 2 mg  2 mg IntraVENous Q4H PRN  
 hydrALAZINE (APRESOLINE) 20 mg/mL injection 20 mg  20 mg IntraVENous Q6H PRN  
 sodium chloride (NS) flush 5-10 mL  5-10 mL IntraVENous Q8H  
 sodium chloride (NS) flush 5-10 mL  5-10 mL IntraVENous PRN  
 acetaminophen (TYLENOL) tablet 650 mg  650 mg Oral Q4H PRN  
 HYDROcodone-acetaminophen (NORCO)  mg tablet 1 Tab  1 Tab Oral Q4H PRN  
 naloxone (NARCAN) injection 0.4 mg  0.4 mg IntraVENous PRN  
 diphenhydrAMINE (BENADRYL) capsule 25 mg  25 mg Oral Q4H PRN  
 bisacodyl (DULCOLAX) tablet 5 mg  5 mg Oral DAILY PRN  
 LORazepam (ATIVAN) tablet 1 mg  1 mg Oral BID PRN  
 ondansetron (ZOFRAN) injection 4 mg  4 mg IntraVENous Q4H PRN Objective:  
 
Vitals:  
 18 0801 18 0901 18 1001 18 1102 BP: 158/65 133/90 139/73 174/70 Pulse: 97 100 95 93 Resp: 29 25 28 (!) 32 Temp:      
SpO2: 92% 99% 98% 96% Weight:      
Height:      
 
Intake and Output:  
1 -  07 In: 740 [P.O.:60; I.V.:400] Out: 4425 [IMNA]  07 - 1900 In: 150 [P.O.:150] Out: - Physical Exam:         
GEN: acutely ill, HEENT:   no alar flaring or epistaxis, oral mucosa moist without cyanosis, NECK:  no JVD, no retractions, no thyromegaly or masses, LUNGS: few scattered wheezes HEART:  RRR with no M,G,R; 
ABDOMEN:  soft with no tenderness; positive bowel sounds present EXTREMITIES:  warm with no cyanosis,no lower leg edema SKIN:  no jaundice or ecchymosis NEURO:  A & O X 3 
 
LINES: PICC, purwick DRIPS: none NUTRITION: regular CHEST XRAY:  
 
 
LAB Recent Labs  
  18 
0322 18 
0122 18 
1022 HGB 9.5* 10.0* 7.0*  
HCT 31.1* 31.7* 22.5* Recent Labs  
  18 
012   
K 4.4  
 CO2 36* * BUN 23  
CREA 0.88 Cultures: NTD Assessment:  
 
Patient Active Problem List  
Diagnosis Code  Essential hypertension, benign I10  
 Cigarette nicotine dependence without complication I76.623  
 Mixed dyslipidemia E78.2  
 COPD, very severe ([facility] [address] J44.9  Diastolic heart failure (HCC) I50.30  Sepsis ([facility] A41.9  Metabolic encephalopathy K40.78  
 HTN (hypertension), malignant I10  
 HCAP (healthcare-associated pneumonia) J18.9  Acute on chronic respiratory failure with hypoxia and hypercapnia (HCC) J96.21, J96.22  
 Anemia D64.9 Plan Hospital Problems  Date Reviewed: 11/23/2018 Codes Class Noted POA Anemia ICD-10-CM: D64.9 ICD-9-CM: 285.9  11/24/2018 Unknown HCAP (healthcare-associated pneumonia) ICD-10-CM: J18.9 ICD-9-CM: 779  11/22/2018 Unknown  
 abx stopped Acute on chronic respiratory failure with hypoxia and hypercapnia (HCC) ICD-10-CM: J96.21, J96.22 
ICD-9-CM: 518.84, 786.09, 799.02  11/22/2018 Unknown With end stage COPD Metabolic encephalopathy PSR-03-HV: G93.41 
ICD-9-CM: 348.31  11/15/2018 Unknown Diastolic heart failure (HCC) (Chronic) ICD-10-CM: I50.30 ICD-9-CM: 428.30  3/9/2013 Yes COPD, very severe ([facility] [address] (Chronic) ICD-10-CM: J44.9 ICD-9-CM: 804  2/28/2013 Yes Overview Addendum 11/30/2017  9:09 AM by Javy Patrick NP  
  FEV1 26% 
  
  
   
  
 
 
-- control HTN, on multiple meds, check in Right arm 
-- end stage COPD with chronic respiratory failure with HTN and acute pulmonary edema. PC plans to see. -- weaning O2, on 4 lpm at home. 5 liter simple mask at night. Trying to arrange Nocturnal BiPAP 12/8 for home 
--continue albuterol, pulmicort and spiriva. Convert to oral prednisone soon 
--okay to transfer to the floor Cleatrice Sacks, NP-C More than 50% of time documented was spent in face-to-face contact with the patient and in the care of the patient on the floor/unit where the patient is located. Lungs: Less wheezing Heart:  RRR with no Murmur/Rubs/Gallops Additional Comments:  Wean O2, cont. Nebs and arrange home BIPAP 12/8 cm to use with sleep I have spoken with and examined the patient. I agree with the above assessment and plan as documented.  
 
Ana Cristina Houston MD

## 2018-11-27 NOTE — PROGRESS NOTES
Patient refused BiPAP machine. Patient states she can't breathe with machine on her face. Also, the mask is not the proper size. Small full face mask are unavailable at this time. I explained the importance of why the machine was ordered and patient is still noncompliant to wearing BiPAP overnight. Patient was left on 4 liter high flow nasal cannula with a saturation of 95%.

## 2018-11-27 NOTE — PROGRESS NOTES
Oli Garza Admission Date: 11/21/2018 Daily Progress Note: 11/27/2018 The patient's chart is reviewed and the patient is discussed with the staff. 
 
77 y.o. CF with very severe COPD at baseline (FEV1 30% in 2017), on chronic O2 at home 4L and continues to smoke. Presents with hypercarbia, somnolent and barely responds to sternal rub with poor air movement. Cannot tell if COPD is exacerbated, but there is some concern for a possible RLL HAP and was placed on Vancomycin and Zosyn. We were consulted 11/22 when she was moved to the ICU for BiPAP. CXR with edema and diuretics given. Abx were stopped as no evidence for infection. Follow up ABG acceptable, was taken off BIPAP and placed on AirVo. Had some episodes of ongoing confusion and wore BIPAP with sleep. Hgb dropped to 7 and received PRBCs transfusion x2. CXR with congestion and Lasix given with good diuresis. Was moved to the floor. Subjective:  
 
Lying in bed and states is breathing \"OK\". No sputum production. States she had diarrhea last night. Refused BIPAP last night--mask too large--no small mask available. Patient found on the floor last night--thinks she \"slid out of bed\". Current Facility-Administered Medications Medication Dose Route Frequency  metoprolol succinate (TOPROL-XL) tablet 100 mg  100 mg Oral DAILY  ferrous sulfate tablet 325 mg  1 Tab Oral BID WITH MEALS  lisinopril (PRINIVIL, ZESTRIL) tablet 10 mg  10 mg Oral DAILY  0.9% sodium chloride infusion 250 mL  250 mL IntraVENous PRN  
 diphenhydrAMINE (BENADRYL) injection 25 mg  25 mg IntraVENous Q4H PRN  
 methylPREDNISolone (PF) (SOLU-MEDROL) injection 20 mg  20 mg IntraVENous Q12H  
 tiotropium (SPIRIVA) inhalation capsule 18 mcg  1 Cap Inhalation DAILY  sodium chloride (NS) flush 20 mL  20 mL InterCATHeter Q8H  
 heparin (porcine) pf 600 Units  600 Units InterCATHeter Q8H  
  sodium chloride (NS) flush 20 mL  20 mL InterCATHeter PRN  
 heparin (porcine) pf 600 Units  600 Units InterCATHeter PRN  pantoprazole (PROTONIX) tablet 40 mg  40 mg Oral DAILY  sodium chloride (NS) flush 5-10 mL  5-10 mL IntraVENous PRN  
 albuterol (PROVENTIL VENTOLIN) nebulizer solution 2.5 mg  2.5 mg Nebulization Q4H RT  
 aspirin delayed-release tablet 81 mg  81 mg Oral DAILY  budesonide (PULMICORT) 500 mcg/2 ml nebulizer suspension  500 mcg Nebulization BID RT  
 gabapentin (NEURONTIN) capsule 100 mg  100 mg Oral QHS  nicotine (NICODERM CQ) 14 mg/24 hr patch 1 Patch  1 Patch TransDERmal QHS  sertraline (ZOLOFT) tablet 100 mg  100 mg Oral DAILY  haloperidol lactate (HALDOL) injection 2 mg  2 mg IntraVENous Q4H PRN  
 hydrALAZINE (APRESOLINE) 20 mg/mL injection 20 mg  20 mg IntraVENous Q6H PRN  
 sodium chloride (NS) flush 5-10 mL  5-10 mL IntraVENous Q8H  
 sodium chloride (NS) flush 5-10 mL  5-10 mL IntraVENous PRN  
 acetaminophen (TYLENOL) tablet 650 mg  650 mg Oral Q4H PRN  
 HYDROcodone-acetaminophen (NORCO)  mg tablet 1 Tab  1 Tab Oral Q4H PRN  
 naloxone (NARCAN) injection 0.4 mg  0.4 mg IntraVENous PRN  
 diphenhydrAMINE (BENADRYL) capsule 25 mg  25 mg Oral Q4H PRN  
 bisacodyl (DULCOLAX) tablet 5 mg  5 mg Oral DAILY PRN  
 LORazepam (ATIVAN) tablet 1 mg  1 mg Oral BID PRN  
 ondansetron (ZOFRAN) injection 4 mg  4 mg IntraVENous Q4H PRN Review of Systems Constitutional: negative for fever, chills, sweats Cardiovascular: negative for chest pain, palpitations, syncope, edema Gastrointestinal:  negative for dysphagia, reflux, vomiting, diarrhea, abdominal pain, or melena Neurologic:  negative for focal weakness, numbness, headache Objective:  
 
Vitals:  
 11/26/18 2305 11/27/18 0010 11/27/18 0410 11/27/18 4627 BP: 131/76 128/77 Pulse: 91 97 Resp: 20 20 Temp: 97.6 °F (36.4 °C) 97.6 °F (36.4 °C) SpO2: 97% 94% 94% Weight:    122 lb (55.3 kg) Height:      
 
Intake and Output:  
11/25 1901 - 11/27 0700 In: 510 [P.O.:510] Out: 1807 [AUBMW:2710] No intake/output data recorded. Physical Exam:  
Constitution:  the patient is well developed, elderly and in no acute distress, NC 3L, sat 92% EENMT:  Sclera clear, pupils equal, oral mucosa moist 
Respiratory: few scattered wheezes, no cough Cardiovascular:  RRR without M,G,R 
Gastrointestinal: soft and non-tender; with positive bowel sounds, reports diarrhea last night. Musculoskeletal: warm without cyanosis. There is no lower leg edema. Skin:  no jaundice or rashes, no wounds Neurologic: no gross neuro deficits Psychiatric:  alert and oriented x 3 CXR: None today CXR 11/25/18: Interstitial pulmonary edema and bile pleural effusions unchanged. LAB No results for input(s): GLUCPOC in the last 72 hours. No lab exists for component: [place] Recent Labs  
  11/26/18 
0322 11/25/18 
0122 HGB 9.5* 10.0* HCT 31.1* 31.7* Recent Labs  
  11/25/18 
0122   
K 4.4  
 CO2 36* * BUN 23  
CREA 0.88 CA 7.9* Recent Labs  
  11/25/18 
0345 PHI 7.318* PCO2I 75.2*  
PO2I 89 HCO3I 38.6* No results for input(s): LCAD, LAC in the last 72 hours. Assessment:  (Medical Decision Making) Hospital Problems  Date Reviewed: 11/27/2018 Codes Class Noted POA * (Principal) Acute on chronic respiratory failure with hypoxia and hypercapnia (HCC) ICD-10-CM: J96.21, J96.22 
ICD-9-CM: 518.84, 786.09, 799.02  11/22/2018 Yes On NC 3L Anemia ICD-10-CM: D64.9 ICD-9-CM: 285.9  11/24/2018 Yes  
 hgb 9.5--recent transfusion HTN (hypertension), malignant (Chronic) ICD-10-CM: I10 
ICD-9-CM: 401.0  11/21/2018 Yes Chronic--controlled Metabolic encephalopathy NZD-[id number]: G93.41 
ICD-9-CM: 348.31  11/15/2018 Yes  
 resolved Diastolic heart failure (HCC) (Chronic) ICD-10-CM: I50.30 ICD-9-CM: 428.30  3/9/2013 Yes chronic Mixed dyslipidemia (Chronic) ICD-10-CM: G81.3 ICD-9-CM: 272.2  2/28/2013 Yes  
 chronic COPD, very severe ([address] (Chronic) ICD-10-CM: J44.9 ICD-9-CM: 344  2/28/2013 Yes FEV1 26% 
  
  
 chronic Debility: PT  
 
Plan:  (Medical Decision Making) --Albuterol, Pulmicort, Spiriva 
--Solu Medrol 20mg q12h 
--Nicoderm patch 
--Nebs and arrange home BIPAP 12/8 cm to use with sleep--Amanda Mathias and Rina Salazar assisting. -- to bring home machine here \"I have a mask that will work\". --Hgb 9.5--recent transfusions. --Blood cultures:  No growth-final.  Not currently on antibiotics 
--Palliative Care following--DNR confirmed and status changed. --PT following 
--Planning to go to rehab More than 50% of the time documented was spent in face-to-face contact with the patient and in the care of the patient on the floor/unit where the patient is located. Randi Thurman NP I have spoken with and examined the patient. I agree with the above assessment and plan as documented. Pt reports she had a study in [facility] that suggested she didn't have CRYSTAL. Gen: pleasant Lungs:  Few scattered wheezes on right Heart:  RRR with no Murmur/Rubs/Gallops Abd: +BS Ext: no edema 
 
--discussed with patient the need for nocturnal BiPAP for COPD 
--transition to oral prednisone tomorrow --continue nebulizers --continue PT 
 
Maria D Gutierrez MD

## 2018-11-27 NOTE — PROGRESS NOTES
Problem: Nutrition Deficit Goal: *Optimize nutritional status Nutrition Reason for assessment: Referral received from nursing admission Malnutrition Screening Tool for recently lost 14-23# without trying and eating poorly due to decreased appetite. Assessment:  
Diet order(s): RegularFood/Nutrition Patient History:  The patient is noted to have a h/o COPD, CHF, metabolic encephalopathy and HTN. The patient reports that her appetite seems to be getting better daily. She states that she likes the Ensure drinks and enjoyed those on her last admission. She states that she has had ~20# weight loss over the past month. Weight history in the EMR cannot be verified as accurate due to unknown weight source (pt stated vs estimated vs measured). Weight Loss Metrics 11/27/2018 11/15/2018 10/12/2018 9/10/2018 Today's Wt 122 lb 110 lb 110 lb 113 lb BMI 20.94 kg/m2 19.49 kg/m2 19.49 kg/m2 20.02 kg/m2 Weight Loss Metrics 9/5/2018 8/17/2018 7/17/2018 6/7/2018 Today's Wt 115 lb 9.6 oz 107 lb 107 lb 130 lb BMI 20.48 kg/m2 18.95 kg/m2 18.95 kg/m2 23.03 kg/m2 Weight Loss Metrics 3/20/2018 3/12/2018 3/1/2018 2/5/2018 Today's Wt 129 lb 3.2 oz 124 lb 12.8 oz 128 lb 128 lb BMI 22.89 kg/m2 22.11 kg/m2 22.67 kg/m2 22.67 kg/m2 Weight Loss Metrics 11/30/2017 Today's Wt 129 lb BMI 22.85 kg/m2 According to the EMR the patient has potentially lost ~7# over the past year and recently gained weight over the past few weeks. Anthropometrics:Height: 5' 4\" (162.6 cm),  Weight: 55.3 kg (122 lb), Weight Source: Standing scale (comment), Body mass index is 20.94 kg/m². BMI class of underweight for age >71. Macronutrient needs: EER:  9280-0827 kcal /day (25-30 kcal/kg listed BW) EPR:  55-65 grams protein/day (1-1.2 grams/kg IBW) Intake/Comparative Standards: Too be determined-no po intakes were recorded Nutrition Diagnosis: Predicted sub-optimal energy intake related to decrease in appetite and recent illness as evidenced by patient recently with hospital admissions and note eating well. Intervention: 
Meals and snacks: Continue current diet. Nutrition Supplement Therapy: Continue Ensure Enlive TID Nutrition Discharge Plan: Too soon to be determined [address] [place] [address], [phone]

## 2018-11-27 NOTE — PROGRESS NOTES
Patient alert and oriented to person, place, and situation throughout this shift. Respirations even and unlabored on 4L O2 via nasal cannula. Patient impulsive throughout this shift, and though she is easily redirected and follows commands, she has poor safety awareness and must frequently be reminded of safety precautions. Patient had fall this shift, no injuries noted. See previous notes. Patient resting quietly in bed at this time, eyes closed, respirations present. No needs stated. Bed low and locked. Bedside table, personal belongings and call light within reach. Bed alarm in place for safety.

## 2018-11-27 NOTE — PROGRESS NOTES
Patient is known to Case Management from previous admissions. She was last discharged on 11/21 when she went to short-term rehab at [place] [address]. She was readmitted shortly after discharge. Patient has been evaluated by PT/OT and would benefit from return to short-term rehab at discharge. At baseline, she lives at home with her . BiPAP has been ordered for home use. Case Management will follow for discharge planning needs. Care Management Interventions PCP Verified by CM: Yes Transition of Care Consult (CM Consult): Discharge Planning Discharge Durable Medical Equipment: No 
Physical Therapy Consult: Yes Occupational Therapy Consult: Yes Speech Therapy Consult: No 
Current Support Network: Lives with Spouse, Own Home Confirm Follow Up Transport: Family Plan discussed with Pt/Family/Caregiver: Yes Freedom of Choice Offered: Yes Discharge Location Discharge Placement: Rehab Unit Subacute

## 2018-11-27 NOTE — PROGRESS NOTES
Received bedside shift report from offgoing nurse, Lui Cannon. Patient resting quietly in bed at this time, awake, respirations present. Denies needs at this time. Call light within reach.

## 2018-11-27 NOTE — PROGRESS NOTES
Palliative Care Progress Note Patient: Braulio Long MRN: [id number]  SSN: [id number] YOB: 1940  Age: 77 y.o. Sex: female Assessment/Plan: Chief Complaint/Interval History: alert, reports she is doing fair. Denies pain Principal Diagnosis: · Debility, Unspecified  R53.81 Additional Diagnoses: · Dyspnea  R06.00 · Fatigue, Lethargy  R53.83 
· Frailty  R54 · Counseling, Encounter for Medical Advice  Z71.9 
· Encounter for Palliative Care  Z51.5 Palliative Performance Scale (PPS) PPS: 40 Medical Decision Making:  
Reviewed and summarized notes over last 24 hours Discussed case with appropriate providers Reviewed laboratory and x-ray data- Hgb/Hct; BMP Pt resting in bed, no distress noted. No family at bedside. Pt reports she is doing fair, and denies pain. She states her breathing is improving some. Discussed plan for STR post discharge. Goals established- we will sign off. Thank you for allowing us to participate in Ms Loera's care. Will discuss findings with members of the interdisciplinary team.   
 
  
More than 50% of this 15 minute visit was spent counseling and coordination of care as outlined above. Subjective:  
 
Review of Systems: A comprehensive review of systems was negative except for:  
Constitutional: Positive for fatigue. Respiratory: Positive for improving dyspnea Objective:  
 
Visit Vitals /81 Pulse 98 Temp 98.2 °F (36.8 °C) Resp 21 Ht 5' 4\" (1.626 m) Wt 122 lb (55.3 kg) SpO2 92% BMI 20.94 kg/m² Physical Exam: 
 
General:  Cooperative. Debilitated. No acute distress. Eyes:  Conjunctivae/corneas clear Nose: Nares normal. Septum midline. O2 via NC Neck: Supple, symmetrical, trachea midline Lungs: Few wheezes bilaterally, unlabored Heart:  Regular rate and rhythm Abdomen:   Soft, non-tender, non-distended Extremities: Normal, atraumatic, no cyanosis or edema Skin: Skin color, texture, turgor normal.   
Neurologic: Nonfocal  
Psych: Alert and oriented Signed By: Mathieu Pack NP November 27, 2018

## 2018-11-27 NOTE — PROGRESS NOTES
This RN was making hourly patient care rounds. Did not see patient in bed, so this RN turned the light on and went around the bed to find the patient lying supine with her head beside the bed and her feet facing the bathroom. Patient nasal cannula lying in the floor. Applied patient nasal cannula. No apparent injuries. Patient denies any pain at this time. Patient placed back in bed, bed alarm in place at this time for patient safety.

## 2018-11-27 NOTE — PROGRESS NOTES
Problem: Mobility Impaired (Adult and Pediatric) Goal: *Acute Goals and Plan of Care (Insert Text) LTG: 
(1.)Ms. Jose M Krishnamurthy will move from supine to sit and sit to supine , scoot up and down and roll side to side with SUPERVISION within 7 treatment day(s). (2.)Ms. Jose M Krishnamurthy will transfer from bed to chair and chair to bed with CONTACT GUARD ASSIST using the least restrictive device within 7 treatment day(s). (3.)Ms. Jose M Krishnamurthy will ambulate with CONTACT GUARD ASSIST for 100+ feet with the least restrictive device within 7 treatment day(s). (4.)Ms. Jose M Krishnamurthy will perform exercises per HEP for 15+ minutes to improve strength and mobility within 7 days. ________________________________________________________________________________________________ PHYSICAL THERAPY: Daily Note, Treatment Day: 1st, PM 11/27/2018INPATIENT: Hospital Day: 7 [place] / Product Type: Medicare /  
  
NAME/AGE/GENDER: Florin Reid is a 77 y.o. female PRIMARY DIAGNOSIS: HTN (hypertension), malignant Acute on chronic respiratory failure with hypoxia and hypercapnia (HCC) Acute on chronic respiratory failure with hypoxia and hypercapnia (HCC) ICD-10: Treatment Diagnosis:  
 · Generalized Muscle Weakness (M62.81) · Difficulty in walking, Not elsewhere classified (R26.2) · Other abnormalities of gait and mobility (R26.89) Precaution/Allergies: 
Demerol [meperidine]; Gabapentin; Lortab [hydrocodone-acetaminophen]; and Statins-hmg-coa reductase inhibitors ASSESSMENT:  
Ms. Jose M Krishnamurthy is a 77year old female admitted from rehab for malignant hypertension, respiratory failure. She lives with  and is ambulatory with rollator at baseline. She had a fall last night trying to get up on her own and seems embarrassed by it now. She is agreeable to trying to walk with me. She needed just a little help to sit up and to stand into the walker.   She reports legs are sore from the fall and only walked about 10-15 ft into the hallway and sat and rested on a chair. She then headed back to the room and wanted to lie down. Encouraged her to get up for meals tomorrow and to walk further with therapy. She understands. This section established at most recent assessment PROBLEM LIST (Impairments causing functional limitations): 1. Decreased Strength 2. Decreased Transfer Abilities 3. Decreased Ambulation Ability/Technique 4. Decreased Balance 5. Decreased Activity Tolerance 6. Increased Shortness of Breath 7. Decreased Cognition INTERVENTIONS PLANNED: (Benefits and precautions of physical therapy have been discussed with the patient.) 1. Balance Exercise 2. Bed Mobility 3. Gait Training 4. Home Exercise Program (HEP) 5. Therapeutic Activites 6. Therapeutic Exercise/Strengthening 7. Transfer Training TREATMENT PLAN: Frequency/Duration: 3 times a week for duration of hospital stay Rehabilitation Potential For Stated Goals: Good RECOMMENDED REHABILITATION/EQUIPMENT: (at time of discharge pending progress): Due to the probability of continued deficits (see above) this patient will likely need continued skilled physical therapy after discharge. Equipment:  
? tbd at rehab HISTORY:  
History of Present Injury/Illness (Reason for Referral): 
Per H&P, \"Patient is a 77 y.o. female who presents to the ER from the SNF/rehab that she was discharged to approximately 3-4 hours ago. She was just hospitalized from 11/15-11/21 for acute metabolic encephalopathy and sepsis. She was stable at the time of discharge. Apparently, upon arrival to SNF, her SBP was over 200 and her RR was elevated with wheezing. They told EMS that they would not accept patient and to send her back to the ER. Pt is a poor historian and does not report any changes in symptoms since this morning. She has end stage COPD and is always wheezing and SOB to some degree. She c/o only feeling \"hot\" Past Medical History/Comorbidities:  
Ms. Radha Peacock  has a past medical history of Abnormal chest CT, Acute respiratory failure ([facility] [address] Anemia, Arrhythmia, Breast cancer ([facility] [address] CAD (coronary artery disease), Cancer ([facility] [address] Carotid stenosis, Cholelithiasis, COPD exacerbation ([facility] [address] COPD, frequent exacerbations ([facility] [address] Coronary atherosclerosis, CVD (cerebrovascular disease), unspecified, RBBB, Rheumatic aortic stenosis, S/P AVR (aortic valve [id number])-0/65/18, Systolic murmur, and Unspecified adverse effect of anesthesia. Ms. Radha Peacock  has a past surgical history that includes pr cardiac surg procedure unlist (2/8/2012); hx carotid endarterectomy (12/8/2008); hx vascular access (1/2013); hx breast lumpectomy (6/2012 8/2012); hx lap cholecystectomy (09/2010); hx hysterectomy (1969; 2008); hx aortic valve replacement (2/28/13); hx carotid endarterectomy (Left, 10/2016); AORTIC VALVE REPLACEMENT (AVR) (N/A, 2/28/2013); and ESOPHAGEAL TRANS ECHOCARDIOGRAM (N/A, 2/28/2013). Social History/Living Environment:  
Home Environment: Rehabilitation facility One/Two Story Residence: Two story Living Alone: No 
Support Systems: Spouse/Significant Other/Partner, Skilled nursing facility Patient Expects to be Discharged to[de-identified] Rehabilitation facility Current DME Used/Available at Home: [employer] Prior Level of Function/Work/Activity: 
Lives with  at baseline. Was admitted from rehab. Typically ambulatory with rollator. Number of Personal Factors/Comorbidities that affect the Plan of Care: 1-2: MODERATE COMPLEXITY EXAMINATION:  
Most Recent Physical Functioning:  
Gross Assessment: 
  
         
  
Posture: 
  
Balance: 
  Bed Mobility: 
Supine to Sit: Minimum assistance Sit to Supine: Independent Wheelchair Mobility: 
  
Transfers: 
Sit to Stand: Minimum assistance Stand to Sit: Contact guard assistance Gait: 
  
Gait Abnormalities: Decreased step clearance;Shuffling gait;Trunk sway increased Distance (ft): 10 Feet (ft)(x2, rested shortly in chair in blood) Assistive Device: Walker, rolling Ambulation - Level of Assistance: Contact guard assistance Body Structures Involved: 1. Muscles Body Functions Affected: 1. Mental 
2. Respiratory 3. Movement Related Activities and Participation Affected: 1. General Tasks and Demands 2. Mobility 3. Domestic Life 4. Community, Social and [place] Number of elements that affect the Plan of Care: 4+: HIGH COMPLEXITY CLINICAL PRESENTATION:  
Presentation: Stable and uncomplicated: LOW COMPLEXITY CLINICAL DECISION MAKIN[address] [place] [address] AM-PAC 6 Clicks Basic Mobility Inpatient Short Form How much difficulty does the patient currently have. .. Unable A Lot A Little None 1. Turning over in bed (including adjusting bedclothes, sheets and blankets)? [] 1   [] 2   [x] 3   [] 4  
2. Sitting down on and standing up from a chair with arms ( e.g., wheelchair, bedside commode, etc.)   [] 1   [] 2   [x] 3   [] 4  
3. Moving from lying on back to sitting on the side of the bed? [] 1   [] 2   [x] 3   [] 4 How much help from another person does the patient currently need. .. Total A Lot A Little None 4. Moving to and from a bed to a chair (including a wheelchair)? [] 1   [] 2   [x] 3   [] 4  
5. Need to walk in hospital room? [] 1   [x] 2   [] 3   [] 4  
6. Climbing 3-5 steps with a railing? [x] 1   [] 2   [] 3   [] 4  
© , Trustees of [address] [place] [address], under license to [employer]. All rights reserved Score:  Initial 15 Most Recent: X (Date: -- ) Interpretation of Tool:  Represents activities that are increasingly more difficult (i.e. Bed mobility, Transfers, Gait). Score 24 23 22-20 19-15 14-10 9-7 6 Modifier CH CI CJ CK CL CM CN   
 
? Mobility - Walking and Moving Around:  
  - CURRENT STATUS: CK - 40%-59% impaired, limited or restricted  - GOAL STATUS: CJ - 20%-39% impaired, limited or restricted  - D/C STATUS:  ---------------To be determined--------------- [place] / [place] / Product Type: Medicare /   
 
Medical Necessity:    
· Patient demonstrates good rehab potential due to higher previous functional level. Reason for Services/Other Comments: 
· Patient continues to demonstrate capacity to improve strength, mobility, balance, transfers, gait which will increase independence, decrease amount of assistance required from caregiver and increase safety. Use of outcome tool(s) and clinical judgement create a POC that gives a: Clear prediction of patient's progress: LOW COMPLEXITY  
  
 
 
 
TREATMENT:  
(In addition to Assessment/Re-Assessment sessions the following treatments were rendered) Pre-treatment Symptoms/Complaints:  \"thank you\" Pain: Initial:  
Pain Intensity 1: 0  Post Session:  0/10 Therapeutic Activity: (15 minutes ):  Therapeutic activities including Bed transfers, Chair transfers, Ambulation on level ground and exercises to improve mobility, strength, balance and coordination. Required minimal   to promote static and dynamic balance in standing and to address exercise form. Date: 
11/26/18 Date: 
 Date: Activity/Exercise Parameters Parameters Parameters LAQ 6x AB Seated marching 6x AB Ankle pumps 6x AB Seated hip aBd 6x AB Braces/Orthotics/Lines/Etc:  
· ICU monitors · O2 Device: Nasal cannula Treatment/Session Assessment:   
· Response to Treatment:  Pt performs mobility with min A  
· Interdisciplinary Collaboration:  
o Physical Therapy Assistant 
o Registered Nurse · After treatment position/precautions:  
o Supine in bed 
o Bed/Chair-wheels locked 
o Bed in low position 
o Call light within reach 
o RN notified · Compliance with Program/Exercises: Compliant all of the time · Recommendations/Intent for next treatment session:   \"Next visit will focus on advancements to more challenging activities and reduction in assistance provided\". Total Treatment Duration: PT Patient Time In/Time Out Time In: 1330 Time Out: 4071 Winfred Severin, PTA

## 2018-11-27 NOTE — PROGRESS NOTES
Bedside shift report completed with oncoming nurse, Soto Bhatia. Patient resting quietly in bed at this time, awake, respirations present. Denies needs at this time. Bed alarm in place for safety. Call light within reach.

## 2018-11-27 NOTE — PROGRESS NOTES
Post Fall Documentation [facility] unwitnessed fall occurred on 11/27/2018 at [address] The answers to the following questions summarize the fall: · In the patient's own words,: 
· What was he/she doing when he/she fell? \"I don't know. \" · What are his/her complaints? No complaints at this time. · Nurse: · Document observation, treatment, conversation, follow-up, and patient response. This RN was conducting hourly patient care rounds. Discovered this patient on the floor lying next to the bed. Inquired with patient regarding what she was trying to do or how she fell into the floor. Patient replied with \"I think I just slid right out of the bed. I don't really know though. \" Educated patient about importance of utilizing call light and that the bed alarm will be utilized. · What was the patient's condition when found (i.e., pain, symptoms, cuts, bruises)? No injuries noted at this time. Patient denies pain at this time. · What specific complaints did the patient have? None · What did the staff do when patient was found (i.e., vital signs, returned to bed with fall alarm, side rails up)? Reapplied patient's nasal cannula, inspected skin and body for injuries, spoke with patient to try to determine what happened to cause fall, inquired about any patient needs at this time, placed patient back in bed, and placed bed alarm in bed. Offered patient fluids and nutrition to which patient declined. · Which physician was notified? Dr. Dustin Ndiaye. [facility] · Family has been notified. yes Emmanuelle Inman

## 2018-11-28 NOTE — PROGRESS NOTES
Bedside shift report completed with oncoming nurse, Lucita Resendiz. Patient resting quietly in bed at this time, eyes closed, respirations present. Bed alarm in place for safety. Call light within reach.

## 2018-11-28 NOTE — DISCHARGE SUMMARY
Hospitalist Discharge Summary Admit Date:  2018  7:13 PM  
Name:  Jama Ganser Age:  77 y.o. 
:  1940 MRN:  [id number] PCP:  Aleks Ybarra MD 
Treatment Team: Attending Provider: Rogelio Griffin MD; Consulting Provider: Keven Schwarz MD; Utilization Review: Amor Pantoja RN; Care Manager: Nicki Hooks, RN; Physician: Leonel Martin MD 
 
Problem List for this Hospitalization: 
Hospital Problems as of 2018 Date Reviewed: 2018 Codes Class Noted - Resolved POA Anemia ICD-10-CM: D64.9 ICD-9-CM: 285.9  2018 - Present Yes * (Principal) Acute on chronic respiratory failure with hypoxia and hypercapnia (HCC) ICD-10-CM: J96.21, J96.22 
ICD-9-CM: 518.84, 786.09, 799.02  2018 - Present Yes HTN (hypertension), malignant (Chronic) ICD-10-CM: I10 
ICD-9-CM: 401.0  2018 - Present Yes Metabolic encephalopathy QYK-57-RM: G93.41 
ICD-9-CM: 348.31  11/15/2018 - Present Yes Diastolic heart failure (HCC) (Chronic) ICD-10-CM: I50.30 ICD-9-CM: 428.30  3/9/2013 - Present Yes Mixed dyslipidemia (Chronic) ICD-10-CM: B25.7 ICD-9-CM: 272.2  2013 - Present Yes COPD, very severe ([address] (Chronic) ICD-10-CM: J44.9 ICD-9-CM: 225  2013 - Present Yes Overview Addendum 2017  9:09 AM by Dee Dee Sullivan NP  
  FEV1 26% RESOLVED: HCAP (healthcare-associated pneumonia) ICD-10-CM: J18.9 ICD-9-CM: 179  2018 - 2018 Yes RESOLVED: Sepsis ([address] ICD-10-CM: A41.9 ICD-9-CM: 038.9, 995.91  11/15/2018 - 2018 Yes Admission HPI from 2018:   
78F with pmhx of CAD, s/p AVR, end stage COPD usually on 3lnc but increased to [address] in last one week presents with AMS. Pt lives at home with her . History taken from him due to her state, however, he is a horrible historian.  He does report that patient is typically able to walk independently, cooks and dresses by herself. When asked about bathing he states \"she won't do it. I don't remember the last time she cleaned up\". Reports patient was her usual self until three days ago when she last her ability to ambulate on her own, requiring his assistance to walk short distances. Then yesterday started getting confused and since this morning fell out of bed three times requiring assistance to get back in bed. No report of fever, chills, nausea, vomiting, diarrhea or report of pain. Pt is agitated, unkept and disoriented. ED workup notable for WBC 20K, /85, 's ST, Na 128, Cr 1.38. UA/CXR unremarkable. CT head pending 
  
Hospitalist asked to admit for sepsis of unknown source and AMS. Hospital Course: 
 Presents with hypercarbia, somnolent and barely responds to sternal rub with poor air movement. Cannot tell if COPD is exacerbated, but there is some concern for a possible RLL HAP and was placed on Vancomycin and Zosyn. Pulmonary were consulted 11/22 when she was moved to the ICU for BiPAP. CXR with edema and diuretics given. Abx were stopped as no evidence for infection.  Follow up ABG acceptable, was taken off BIPAP and placed on AirVo. Had some episodes of ongoing confusion and wore BIPAP with sleep. Hgb dropped to 7 and received PRBCs transfusion x2. CXR with congestion and Lasix given with good diuresis. acute diastolic chf exa- stable. Was moved to the floor. Pt was on nasal cannula - had problems with bipap mask- didn't have small mask. pts respiratory status improved. Pt stable to be discharge as per pulmonary- will uses bipap as per there recommendations at rehab. Pt is stable for discharge. Follow up instructions below. Plan was discussed with patient. All questions answered. Patient was stable at time of discharge and was instructed to call or return if there are any concerns or recurrence of symptoms. Diagnostic Imaging/Tests: Xr Chest Asael Chill Result Date: 11/21/2018 PORTABLE CHEST X-RAY HISTORY: Shortness of breath beginning this afternoon. COPD. COMPARISON: November 19, 2018 FINDINGS: Median sternotomy wires are present. Interstitial testes are present throughout the right lung and there is increased density in the right lung base concerning for developing consolidation. A prosthetic heart valve is present. IMPRESSION: Interstitial densities in the right lung with suspected developing consolidation or edema in the right lung base. Echocardiogram results: No results found for this visit on 11/21/18. All Micro Results Procedure Component Value Units Date/Time CULTURE, BLOOD [[id number]] Collected:  11/22/18 0936 Order Status:  Completed Specimen:  Blood Updated:  11/27/18 0730 Special Requests: --     
  LEFT 
FOREARM Culture result: NO GROWTH 5 DAYS     
 CULTURE, BLOOD [[id number]] Collected:  11/22/18 0663 Order Status:  Completed Specimen:  Blood Updated:  11/27/18 0730 Special Requests: --     
  RIGHT 
HAND Culture result: NO GROWTH 5 DAYS     
 CULTURE, RESPIRATORY/SPUTUM/BRONCH [place] [[id number]] Collected:  11/22/18 0730 Order Status:  Canceled Specimen:  Sputum Labs: Results:  
   
BMP, Mg, Phos Recent Labs  
  11/27/18 
0924   
K 3.7 CL 99  
CO2 42* AGAP 2* BUN 19  
CREA 0.75 CA 8.2*  
GLU 91  
  
CBC Recent Labs  
  11/27/18 
0924 11/26/18 
0322 HGB 9.5* 9.5* HCT 31.1* 31.1*  
  
LFT No results for input(s): SGOT, ALT, TBIL, AP, TP, ALB, GLOB, AGRAT, GPT in the last 72 hours. Cardiac Testing Lab Results Component Value Date/Time   11/25/2018 01:22 AM  
  03/11/2013 05:35 AM  
  03/09/2013 05:55 PM  
  01/03/2013 09:45 AM  
  (H) 01/03/2013 09:45 AM  
  (H) 12/11/2008 08:07 AM  
  (H) 12/11/2008 01:31 AM  
 CK - MB 4.4 (H) 01/03/2013 09:45 AM  
 CK - MB 12.9 (H) 12/11/2008 08:07 AM  
 CK - MB 12.5 (H) 12/11/2008 01:31 AM  
 CK-MB Index 1.1 01/03/2013 09:45 AM  
 CK-MB Index 5.5 (H) 12/11/2008 08:07 AM  
 CK-MB Index 4.2 (H) 12/11/2008 01:31 AM  
 Troponin-I, Qt. 0.03 10/12/2018 03:42 PM  
 Troponin-I, Qt. <0.02 (L) 06/07/2018 03:11 PM  
 Troponin-I, Qt. 0.16 (H) 01/04/2013 03:15 AM  
  
Coagulation Tests Lab Results Component Value Date/Time Prothrombin time 12.5 11/17/2018 06:33 AM  
 Prothrombin time 10.1 02/11/2013 09:40 AM  
 Prothrombin time 10.1 02/08/2013 08:25 AM  
 INR 1.0 11/17/2018 06:33 AM  
 INR 1.0 02/11/2013 09:40 AM  
 INR 1.0 02/08/2013 08:25 AM  
 aPTT 28.9 02/11/2013 09:40 AM  
 aPTT 26.0 01/04/2013 10:45 AM  
 aPTT 51.6 (H) 01/04/2013 03:45 AM  
  
A1c Lab Results Component Value Date/Time Hemoglobin A1c 6.3 (H) 02/11/2013 09:40 AM  
  
Lipid Panel Lab Results Component Value Date/Time Cholesterol, total 267 (H) 09/09/2015 10:59 AM  
 HDL Cholesterol 67 09/09/2015 10:59 AM  
 LDL, calculated 169 (H) 09/09/2015 10:59 AM  
 VLDL, calculated 31 09/09/2015 10:59 AM  
 Triglyceride 155 (H) 09/09/2015 10:59 AM  
 CHOL/HDL Ratio 12.5 01/04/2013 03:15 AM  
  
Thyroid Panel Lab Results Component Value Date/Time TSH 0.519 11/15/2018 08:13 PM  
 TSH 1.110 03/15/2017 03:05 PM  
    
Most Recent UA Lab Results Component Value Date/Time Color YELLOW 11/23/2018 09:43 AM  
 Appearance CLEAR 11/23/2018 09:43 AM  
 Specific gravity 1.013 11/15/2018 05:11 PM  
 pH (UA) 5.5 11/23/2018 09:43 AM  
 Protein 30 (A) 11/23/2018 09:43 AM  
 Glucose NEGATIVE  11/23/2018 09:43 AM  
 Ketone NEGATIVE  11/23/2018 09:43 AM  
 Bilirubin NEGATIVE  11/23/2018 09:43 AM  
 Blood NEGATIVE  11/23/2018 09:43 AM  
 Urobilinogen 0.2 11/23/2018 09:43 AM  
 Nitrites NEGATIVE  11/23/2018 09:43 AM  
 Leukocyte Esterase NEGATIVE  11/23/2018 09:43 AM  
  
 
Allergies Allergen Reactions  Demerol [Meperidine] Other (comments) Low blood pressure  Gabapentin Other (comments) Heart Racing Pt states that she got her \"dosage lowered by the doctor and is able to tolerate 100 mg of gabapentin now\", prior she was receiving 300mg.  Lortab [Hydrocodone-Acetaminophen] Swelling HYPOTENSION 
  
 Statins-Hmg-Coa Reductase Inhibitors Unknown (comments) Immunization History Administered Date(s) Administered  Influenza High Dose Vaccine PF 11/10/2016, 09/20/2017  Influenza Vaccine 09/01/2015  Influenza Vaccine (Quad) PF 11/21/2018  Pneumococcal Conjugate (PCV-13) 03/06/2015  TB Skin Test (PPD) Intradermal 11/21/2018 All Labs from Last 24 Hrs: 
No results found for this or any previous visit (from the past 24 hour(s)). Discharge Exam: 
Patient Vitals for the past 24 hrs: 
 Temp Pulse Resp BP SpO2  
11/28/18 0805     97 % 11/28/18 0755 98.4 °F (36.9 °C) 78 19 145/77 99 % 11/28/18 0340     93 % 11/28/18 0252 98 °F (36.7 °C) 88 18 120/75 94 % 11/27/18 2343     92 % 11/27/18 2257 98.2 °F (36.8 °C) 88 18 114/72 94 % 11/27/18 1937     96 % 11/27/18 1931 98.2 °F (36.8 °C) 89 18 101/67 94 % 11/27/18 1619 98 °F (36.7 °C) 87 21 115/68 98 % 11/27/18 1556     98 % 11/27/18 1203 97.7 °F (36.5 °C) 88 20 126/75 97 % 11/27/18 1118     95 % Oxygen Therapy O2 Sat (%): 97 % (11/28/18 0805) Pulse via Oximetry: 77 beats per minute (11/28/18 0805) O2 Device: Hi flow nasal cannula (11/28/18 0805) O2 Flow Rate (L/min): 3 l/min (11/28/18 0805) O2 Temperature: 87.8 °F (31 °C) (11/24/18 2312) FIO2 (%): 35 % (11/26/18 2209) Intake/Output Summary (Last 24 hours) at 11/28/2018 2422 Last data filed at 11/27/2018 8706 Gross per 24 hour Intake 240 ml Output 200 ml Net 40 ml General:    Well nourished. Alert. On 3 lit/min nasal cannula Eyes:   Normal sclera. Extraocular movements intact. ENT:  Normocephalic, atraumatic. Moist mucous membranes CV:   Regular rate and rhythm. No murmur, rub, or gallop. Lungs:  Clear to auscultation bilaterally. No wheezing, rhonchi, or rales. Abdomen: Soft, nontender, nondistended. Bowel sounds normal.  
Extremities: Warm and dry. No cyanosis or edema. Neurologic: CN II-XII grossly intact. Sensation intact. Skin:     No rashes or jaundice. Psych:  Normal mood and affect. Discharge Info:  
Current Discharge Medication List  
  
START taking these medications Details  
ferrous sulfate 325 mg (65 mg iron) tablet Take 1 Tab by mouth two (2) times daily (with meals). Qty: 60 Tab, Refills: 1  
  
pantoprazole (PROTONIX) 40 mg tablet Take 1 Tab by mouth daily. 30 minutes before breakfast. 
Qty: 15 Tab, Refills: 0  
  
tiotropium (SPIRIVA) 18 mcg inhalation capsule Take 1 Cap by inhalation daily. Qty: 30 Cap, Refills: 1 Medrol dose pack- take as directed. CONTINUE these medications which have CHANGED Details  
metoprolol succinate (TOPROL-XL) 100 mg tablet Take 1 Tab by mouth daily. Qty: 30 Tab, Refills: 1 CONTINUE these medications which have NOT CHANGED Details  
nicotine (NICODERM CQ) 14 mg/24 hr patch 1 Patch by TransDERmal route every twenty-four (24) hours for 30 days. Qty: 30 Patch, Refills: 0  
  
albuterol (PROVENTIL HFA, VENTOLIN HFA, PROAIR HFA) 90 mcg/actuation inhaler Take 2 Puffs by inhalation every four (4) hours as needed for Wheezing. Qty: 1 Inhaler, Refills: 11  
  
albuterol-ipratropium (DUO-NEB) 2.5 mg-0.5 mg/3 ml nebu 3 mL by Nebulization route four (4) times daily. Qty: 360 Nebule, Refills: 3  
  
budesonide (PULMICORT) 0.5 mg/2 mL nbsp 2 mL by Nebulization route two (2) times a day. Qty: 60 Each, Refills: 11  
  
gabapentin (NEURONTIN) 100 mg capsule Take 1 Cap by mouth nightly. Qty: 30 Cap, Refills: 6 Associated Diagnoses: Pain in both lower extremities  
  
sertraline (ZOLOFT) 100 mg tablet Take 1 Tab by mouth daily. Qty: 30 Tab, Refills: 6  
  
aspirin delayed-release 81 mg tablet Take 1 Tab by mouth daily. Qty: 30 Tab, Refills: 11 STOP taking these medications OXYGEN-AIR DELIVERY SYSTEMS Comments:  
Reason for Stopping:   
   
  
 
 
 
Disposition: rehab Activity: As  Per rehab. Diet: 2gm low sodium diet. DIET NUTRITIONAL SUPPLEMENTS Breakfast; Ensure [place] Follow-up Appointments Procedures  FOLLOW UP VISIT Appointment in: One Month Follow up appointment with [facility] in 4 weeks. Follow up appointment with [facility] in 4 weeks. Standing Status:   Standing Number of Occurrences:   1 Order Specific Question:   Appointment in Answer:   One Month  FOLLOW UP VISIT Appointment in: One Week F/u with pcp 1 week after discharge from rehab. Cbc and bmp in a week at rehab and Nursing home physician to follow those labs. Pt will be going on  3 lit of oxygen at rest and continue orders for bipap as . .. F/u with pcp 1 week after discharge from rehab. Cbc and bmp in a week at rehab and Nursing home physician to follow those labs. Pt will be going on  3 lit of oxygen at rest and continue orders for bipap as per pulmonary. Decubitus ulcer precautions. Standing Status:   Standing Number of Occurrences:   1 Standing Expiration Date:   11/29/2018 Order Specific Question:   Appointment in Answer: One Week Follow-up Information Follow up With Specialties Details Why Contact Info [address] 
[phone] Time spent in patient discharge planning and coordination 35 minutes.  
 
Signed: 
Keyshawn Toro MD

## 2018-11-28 NOTE — PROGRESS NOTES
[facility] OUTREACH NURSE PROGRESS REPORT SUBJECTIVE: Called to assess patient secondary to [facility] program protocol. MEWS Score: 1 (11/28/18 0252) Vitals:  
 11/28/18 1326 11/28/18 0755 11/28/18 0805 11/28/18 1109 BP:  145/77 Pulse:  78 Resp:  19 Temp:  98.4 °F (36.9 °C) SpO2:  99% 97% 98% Weight: 55.1 kg (121 lb 8 oz) Height:      
  
 
 
LAB DATA: 
 
Recent Labs  
  11/28/18 
0832 11/27/18 
7969  143  
K 4.0 3.7  99 CO2 42* 42* AGAP 3* 2*  
GLU 74 91 BUN 19 19 CREA 0.75 0.75 GFRAA >60 >60 GFRNA >60 >60  
CA 8.6 8.2* Recent Labs  
  11/28/18 
7634 11/27/18 
4248 11/26/18 
2310 WBC 10.2  --   --   
HGB 10.0* 9.5* 9.5* HCT 33.7* 31.1* 31.1*  
  --   -- OBJECTIVE: On arrival to room, I found patient to be up to bedside commode getting back to bed with minimal assist, no distress noted. Pain Assessment Pain Intensity 1: 0 (11/28/18 0725) Patient Stated Pain Goal: 0 
 
  
  
  
  
 
  
  
  
   
 
ASSESSMENT:  Patient a/ox4, denies pain or shortness of breath however appears slightly dyspneic after exertion. O2sat 92% on 3L NC, HR 86. Lungs diminished with expiratory wheeze bilaterally. PLAN:  Will continue to follow per outreach program protocol.   
 
Deepak Ni RN

## 2018-11-28 NOTE — PROGRESS NOTES
Patient is discharging to [facility] for short-term rehab. Spoke with patient and patient's  by phone. Both voice understanding and agreement with the discharge plan. Patient will transport via [facility] [employer] [facility] at 13:00 today. Case Management will remain available to assist as needed. Care Management Interventions PCP Verified by CM: Yes Transition of Care Consult (CM Consult): Discharge Planning Discharge Durable Medical Equipment: No 
Physical Therapy Consult: Yes Occupational Therapy Consult: Yes Speech Therapy Consult: No 
Current Support Network: Lives with Spouse, Own Home Confirm Follow Up Transport: Family Plan discussed with Pt/Family/Caregiver: Yes Freedom of Choice Offered: Yes Discharge Location Discharge Placement: Rehab Unit Subacute

## 2018-11-28 NOTE — PROGRESS NOTES
Report given to Chloe Merino LPN at [facility] and rehab. Time was given for questions and answers with name and number also left if questions should arise. Tammine to transport patient to facility at 13:00. Will monitor till

## 2018-11-28 NOTE — PROGRESS NOTES
Patient resting in bed with no complaints at this time. Patient is alert with confusion but no distress noted. IV intact and patent with no s/s of infection noted. Respirations even and unlabored with heart rate regular. Patient unable to ambulate independently without assistance; needs x1 r/t weakness and unsteady gait. Bed in low locked position with call light within reach. Patient instructed to call if assistance is needed. Will continue to monitor.

## 2018-11-28 NOTE — PROGRESS NOTES
Evon Whyte Admission Date: 11/21/2018 Daily Progress Note: 11/28/2018 The patient's chart is reviewed and the patient is discussed with the staff. 
 
77 y.o. CF with very severe COPD at baseline (FEV1 30% in 2017), on chronic O2 at home 4L and continues to smoke. Presents with hypercarbia, somnolent and barely responds to sternal rub with poor air movement. Cannot tell if COPD is exacerbated, but there is some concern for a possible RLL HAP and was placed on Vancomycin and Zosyn. We were consulted 11/22 when she was moved to the ICU for BiPAP. CXR with edema and diuretics given. Abx were stopped as no evidence for infection. Follow up ABG acceptable, was taken off BIPAP and placed on AirVo. Had some episodes of ongoing confusion and wore BIPAP with sleep. Hgb dropped to 7 and received PRBCs transfusion x2. CXR with congestion and Lasix given with good diuresis. Was moved to the floor. Subjective:  
 
Lying in bed, remains on NC. Refused to wear NIPPV last night due to improper fitting mask--her  did not bring her mask from home. No sputum production. Current Facility-Administered Medications Medication Dose Route Frequency  metoprolol succinate (TOPROL-XL) tablet 100 mg  100 mg Oral DAILY  ferrous sulfate tablet 325 mg  1 Tab Oral BID WITH MEALS  lisinopril (PRINIVIL, ZESTRIL) tablet 10 mg  10 mg Oral DAILY  0.9% sodium chloride infusion 250 mL  250 mL IntraVENous PRN  
 diphenhydrAMINE (BENADRYL) injection 25 mg  25 mg IntraVENous Q4H PRN  
 methylPREDNISolone (PF) (SOLU-MEDROL) injection 20 mg  20 mg IntraVENous Q12H  
 tiotropium (SPIRIVA) inhalation capsule 18 mcg  1 Cap Inhalation DAILY  sodium chloride (NS) flush 20 mL  20 mL InterCATHeter Q8H  
 heparin (porcine) pf 600 Units  600 Units InterCATHeter Q8H  
 sodium chloride (NS) flush 20 mL  20 mL InterCATHeter PRN  
  heparin (porcine) pf 600 Units  600 Units InterCATHeter PRN  pantoprazole (PROTONIX) tablet 40 mg  40 mg Oral DAILY  sodium chloride (NS) flush 5-10 mL  5-10 mL IntraVENous PRN  
 albuterol (PROVENTIL VENTOLIN) nebulizer solution 2.5 mg  2.5 mg Nebulization Q4H RT  
 aspirin delayed-release tablet 81 mg  81 mg Oral DAILY  budesonide (PULMICORT) 500 mcg/2 ml nebulizer suspension  500 mcg Nebulization BID RT  
 gabapentin (NEURONTIN) capsule 100 mg  100 mg Oral QHS  nicotine (NICODERM CQ) 14 mg/24 hr patch 1 Patch  1 Patch TransDERmal QHS  sertraline (ZOLOFT) tablet 100 mg  100 mg Oral DAILY  haloperidol lactate (HALDOL) injection 2 mg  2 mg IntraVENous Q4H PRN  
 hydrALAZINE (APRESOLINE) 20 mg/mL injection 20 mg  20 mg IntraVENous Q6H PRN  
 sodium chloride (NS) flush 5-10 mL  5-10 mL IntraVENous Q8H  
 sodium chloride (NS) flush 5-10 mL  5-10 mL IntraVENous PRN  
 acetaminophen (TYLENOL) tablet 650 mg  650 mg Oral Q4H PRN  
 HYDROcodone-acetaminophen (NORCO)  mg tablet 1 Tab  1 Tab Oral Q4H PRN  
 naloxone (NARCAN) injection 0.4 mg  0.4 mg IntraVENous PRN  
 diphenhydrAMINE (BENADRYL) capsule 25 mg  25 mg Oral Q4H PRN  
 bisacodyl (DULCOLAX) tablet 5 mg  5 mg Oral DAILY PRN  
 LORazepam (ATIVAN) tablet 1 mg  1 mg Oral BID PRN  
 ondansetron (ZOFRAN) injection 4 mg  4 mg IntraVENous Q4H PRN Review of Systems Constitutional: negative for fever, chills, sweats Cardiovascular: negative for chest pain, palpitations, syncope, edema Gastrointestinal:  negative for dysphagia, reflux, vomiting, diarrhea, abdominal pain, or melena Neurologic:  negative for focal weakness, numbness, headache Objective:  
 
Vitals:  
 11/27/18 2343 11/28/18 6561 11/28/18 0340 11/28/18 6536 BP:  120/75 Pulse:  88 Resp:  18 Temp:  98 °F (36.7 °C) SpO2: 92% 94% 93% Weight:    121 lb 8 oz (55.1 kg) Height:      
 
Intake and Output:  
11/26 1901 - 11/28 0700 In: 360 [P.O.:360] Out: 507 [BNNSY:249] No intake/output data recorded. Physical Exam:  
Constitution:  the patient is well developed, elderly and in no acute distress, NC 3L, sat 93% EENMT:  Sclera clear, pupils equal, oral mucosa moist 
Respiratory: few scattered wheezes, no cough Cardiovascular:  RRR without M,G,R 
Gastrointestinal: soft and non-tender; with positive bowel sounds, reports diarrhea last night. Musculoskeletal: warm without cyanosis. There is no lower leg edema. Skin:  no jaundice or rashes, no wounds Neurologic: no gross neuro deficits Psychiatric:  alert and oriented x 3 CXR: None today CXR 11/25/18: Interstitial pulmonary edema and bile pleural effusions unchanged. LAB No results for input(s): GLUCPOC in the last 72 hours. No lab exists for component: [place] Recent Labs  
  11/27/18 
0924 11/26/18 
0322 HGB 9.5* 9.5* HCT 31.1* 31.1* Recent Labs  
  11/27/18 
2760   
K 3.7 CL 99  
CO2 42* GLU 91 BUN 19  
CREA 0.75 CA 8.2* No results for input(s): PH, PCO2, PO2, HCO3, PHI, PCO2I, PO2I, HCO3I in the last 72 hours. No results for input(s): LCAD, LAC in the last 72 hours. Assessment:  (Medical Decision Making) Hospital Problems  Date Reviewed: 11/28/2018 Codes Class Noted POA * (Principal) Acute on chronic respiratory failure with hypoxia and hypercapnia (HCC) ICD-10-CM: J96.21, J96.22 
ICD-9-CM: 518.84, 786.09, 799.02  11/22/2018 Yes On NC 3L Anemia ICD-10-CM: D64.9 ICD-9-CM: 285.9  11/24/2018 Yes  
 hgb 9.5--recent transfusion HTN (hypertension), malignant (Chronic) ICD-10-CM: I10 
ICD-9-CM: 401.0  11/21/2018 Yes Chronic--controlled Metabolic encephalopathy OLV-21-LO: G93.41 
ICD-9-CM: 348.31  11/15/2018 Yes  
 resolved Diastolic heart failure (HCC) (Chronic) ICD-10-CM: I50.30 ICD-9-CM: 428.30  3/9/2013 Yes  
 chronic Mixed dyslipidemia (Chronic) ICD-10-CM: D23.3 ICD-9-CM: 272.2  2/28/2013 Yes  
 chronic COPD, very severe ([facility] [address] (Chronic) ICD-10-CM: J44.9 ICD-9-CM: 240  2/28/2013 Yes FEV1 26% 
  
  
 chronic Debility: PT  
 
Plan:  (Medical Decision Making) --Albuterol, Pulmicort, Spiriva 
--Solu Medrol 20mg t86i--nvulnl to PO 
--Nicoderm patch 
--Arranged for home BIPAP 12/8 cm to use with sleep through [address]. Will need to have overnight oximetry prior to going home to determine O2 requirements. --Hgb 9.5--recent transfusions. --Blood cultures:  No growth-final.  Not currently on antibiotics 
--Palliative Care following--DNR confirmed and status changed. --PT following 
--Planning to go to rehab at [facility]--OK from pulmonary standpoint for discharge today. --Follow up in our office in 1 month. More than 50% of the time documented was spent in face-to-face contact with the patient and in the care of the patient on the floor/unit where the patient is located. Jade Ham NP I have spoken with and examined the patient. I agree with the above assessment and plan as documented. Mrs. Ajith Oliveira agrees to STR and plans to use BiPAP there. She agrees to [facility] try\" to quit smoking. Gen: pleasant, no distress Lungs:  CTA Heart:  RRR with no Murmur/Rubs/Gallops Abd:+BS Ext: no edema 
 
--f/u at [facility] in 4-6 weeks for hospital follow up --BiPAP recommended nightly 
--recommend smoking cessation 
--resume home bronchodilators Abner Salazar MD

## 2018-11-28 NOTE — PROGRESS NOTES
Patient alert and oriented to person and place. Respirations even and unlabored on 3L O2 via nasal cannula. Patient rested well throughout this shift. No acute events overnight. Patient resting quietly in bed at this time, eyes closed, respirations present. No needs stated. Bed low and locked. Bedside table, personal belongings and call light within reach. Bed alarm in place for safety.

## 2018-11-28 NOTE — PROGRESS NOTES
Problem: Falls - Risk of 
Goal: *Absence of Falls Document Charlie Kins Fall Risk and appropriate interventions in the flowsheet. Outcome: Progressing Towards Goal 
Fall Risk Interventions: 
Mobility Interventions: Assess mobility with egress test, Bed/chair exit alarm, Communicate number of staff needed for ambulation/transfer, Patient to call before getting OOB, Strengthening exercises (ROM-active/passive), OT consult for ADLs, PT Consult for assist device competence, PT Consult for mobility concerns Mentation Interventions: Adequate sleep, hydration, pain control, Door open when patient unattended, Reorient patient, Toileting rounds, Evaluate medications/consider consulting pharmacy, Increase mobility, Update white board Medication Interventions: Evaluate medications/consider consulting pharmacy Elimination Interventions: Patient to call for help with toileting needs, Call light in reach History of Falls Interventions: Bed/chair exit alarm, Consult care management for discharge planning Problem: Pressure Injury - Risk of 
Goal: *Prevention of pressure injury Document Neville Scale and appropriate interventions in the flowsheet. Outcome: Progressing Towards Goal 
Pressure Injury Interventions: 
Sensory Interventions: Assess changes in LOC, Assess need for specialty bed, Avoid rigorous massage over bony prominences, Discuss PT/OT consult with provider, Keep linens dry and wrinkle-free, Maintain/enhance activity level, Minimize linen layers, Pressure redistribution bed/mattress (bed type) Moisture Interventions: Absorbent underpads, Check for incontinence Q2 hours and as needed, Minimize layers, Offer toileting Q_hr Activity Interventions: Assess need for specialty bed, PT/OT evaluation, Increase time out of bed, Pressure redistribution bed/mattress(bed type) Mobility Interventions: PT/OT evaluation, Pressure redistribution bed/mattress (bed type), HOB 30 degrees or less Nutrition Interventions: Document food/fluid/supplement intake, Discuss nutritional consult with provider, Offer support with meals,snacks and hydration Friction and Shear Interventions: Apply protective barrier, creams and emollients, Foam dressings/transparent film/skin sealants, Lift sheet, Minimize layers

## 2018-11-28 NOTE — PROGRESS NOTES
Patient has been accepted for short-term rehab admission at [place] [address]. She may transfer today if medically ready for discharge. Care Management Interventions PCP Verified by CM: Yes Transition of Care Consult (CM Consult): Discharge Planning Discharge Durable Medical Equipment: No 
Physical Therapy Consult: Yes Occupational Therapy Consult: Yes Speech Therapy Consult: No 
Current Support Network: Lives with Spouse, Own Home Confirm Follow Up Transport: Family Plan discussed with Pt/Family/Caregiver: Yes Freedom of Choice Offered: Yes Discharge Location Discharge Placement: Rehab Unit Subacute

## 2018-11-28 NOTE — PROGRESS NOTES
Patient calls nurse  Says having r Trouble breathing  Diminished breath sounds  Oxygen at 2l via high flow cannula sat 80%  Oxygen increased to 4l  Sat increased to 92%  Respiratory called and notified

## 2018-11-28 NOTE — PROGRESS NOTES
Hospitalist Progress Note Admit Date:  2018  7:13 PM  
Name:  Ben Lakhani Age:  77 y.o. 
:  1940 MRN:  [id number] PCP:  Katerina Green MD 
Treatment Team: Attending Provider: Christiano Fernandez MD; Consulting Provider: Jael Mendoza MD; Utilization Review: Viral Houston RN; Care Manager: Kwadwo Smith RN; Physician: Dipika Frausto MD 
 
Subjective:  
F/U hypoxia, acute metabolic encephalopathy 
  
Patient discharged  after was admitted for sepsis of unknown infectious source, hyponatremia, anemia s/p 1 unit PRBC, and COPD exacerbation. Patient was treated with zosyn and vancomycin for 6 days during that time. No signs of pneumonia and repeat blood cultures negative from original blood cultures that were thought to be contaminate.  
  
After being discharged, SNF found patient to have BP over 200 and had worsening wheezing. Patient admitted again for malignant HTN on . Around 7:22AM on day of admission, patient found to now have lower BP with MAP 68 and oxygen saturations 64%. Patient had taken off her nasal cannula stating \"she does not need oxygen. \" When placed back on 4L NC oxygen saturations improved to 98%. Chest x ray showed Interstitial densities in the right lung with suspected developing consolidation or edema in the right lung base. 
  
Patient placed on BiPAP for hypercarbia. Attempted to wean from BiPAP but patient would become dyspneic. Transferred to ICU for closer monitoring. Pt respiratory status improved and was transferred back to floor. 18 Refused bipap last night- says problems with mask- didn't have small face mask. Says sob better Wants to go rehab if required. Objective:  
 
Patient Vitals for the past 24 hrs: 
 Temp Pulse Resp BP SpO2  
18 1937     96 % 18 1931 98.2 °F (36.8 °C) 89 18 101/67 94 % 18 1619 98 °F (36.7 °C) 87 21 115/68 98 % 18 1556     98 % 11/27/18 1203 97.7 °F (36.5 °C) 88 20 126/75 97 % 11/27/18 1118     95 % 11/27/18 0825 98.2 °F (36.8 °C) 98 21 114/81 92 % 11/27/18 0730     90 % 11/27/18 0410     94 % 11/27/18 0010 97.6 °F (36.4 °C) 97 20 128/77 94 % 11/26/18 2305 97.6 °F (36.4 °C) 91 20 131/76 97 % 11/26/18 2304     97 % 11/26/18 2251     95 % 11/26/18 2209     95 % Oxygen Therapy O2 Sat (%): 96 % (11/27/18 1937) Pulse via Oximetry: 83 beats per minute (11/27/18 1937) O2 Device: Hi flow nasal cannula;Humidifier (11/27/18 1937) O2 Flow Rate (L/min): 3 l/min (11/27/18 1937) O2 Temperature: 87.8 °F (31 °C) (11/24/18 2312) FIO2 (%): 35 % (11/26/18 2209) Intake/Output Summary (Last 24 hours) at 11/27/2018 2200 Last data filed at 11/27/2018 3602 Gross per 24 hour Intake 360 ml Output 507 ml Net -147 ml General:    Well nourished. Alert. On oxygen 
heent- normal  
CV:   RRR. No murmur, rub, or gallop. Lungs:   Mild wheezing Abdomen:   Soft, nontender, nondistended. Cns- no focal neurological deficits Extremities: Warm and dry. No cyanosis or edema. Skin:     No rashes or jaundice. Data Review: 
I have reviewed all labs, meds, telemetry events, and studies from the last 24 hours. Recent Results (from the past 24 hour(s)) HGB & HCT Collection Time: 11/27/18  9:24 AM  
Result Value Ref Range HGB 9.5 (L) 11.7 - 15.4 g/dL HCT 31.1 (L) 35.8 - 46.3 % METABOLIC PANEL, BASIC Collection Time: 11/27/18  9:24 AM  
Result Value Ref Range Sodium 143 136 - 145 mmol/L Potassium 3.7 3.5 - 5.1 mmol/L Chloride 99 98 - 107 mmol/L  
 CO2 42 (HH) 21 - 32 mmol/L Anion gap 2 (L) 7 - 16 mmol/L Glucose 91 65 - 100 mg/dL BUN 19 8 - 23 MG/DL Creatinine 0.75 0.6 - 1.0 MG/DL  
 GFR est AA >60 >60 ml/min/1.73m2 GFR est non-AA >60 >60 ml/min/1.73m2 Calcium 8.2 (L) 8.3 - 10.4 MG/DL All Micro Results Procedure Component Value Units Date/Time CULTURE, BLOOD [[id number]] Collected:  11/22/18 0936 Order Status:  Completed Specimen:  Blood Updated:  11/27/18 0730 Special Requests: --     
  LEFT 
FOREARM Culture result: NO GROWTH 5 DAYS     
 CULTURE, BLOOD [[id number]] Collected:  11/22/18 8918 Order Status:  Completed Specimen:  Blood Updated:  11/27/18 0730 Special Requests: --     
  RIGHT 
HAND Culture result: NO GROWTH 5 DAYS     
 CULTURE, RESPIRATORY/SPUTUM/BRONCH Erum Mcdonald [[id number]] Collected:  11/22/18 0730 Order Status:  Canceled Specimen:  Sputum Current Meds: 
Current Facility-Administered Medications Medication Dose Route Frequency  metoprolol succinate (TOPROL-XL) tablet 100 mg  100 mg Oral DAILY  ferrous sulfate tablet 325 mg  1 Tab Oral BID WITH MEALS  lisinopril (PRINIVIL, ZESTRIL) tablet 10 mg  10 mg Oral DAILY  0.9% sodium chloride infusion 250 mL  250 mL IntraVENous PRN  
 diphenhydrAMINE (BENADRYL) injection 25 mg  25 mg IntraVENous Q4H PRN  
 methylPREDNISolone (PF) (SOLU-MEDROL) injection 20 mg  20 mg IntraVENous Q12H  
 tiotropium (SPIRIVA) inhalation capsule 18 mcg  1 Cap Inhalation DAILY  sodium chloride (NS) flush 20 mL  20 mL InterCATHeter Q8H  
 heparin (porcine) pf 600 Units  600 Units InterCATHeter Q8H  
 sodium chloride (NS) flush 20 mL  20 mL InterCATHeter PRN  
 heparin (porcine) pf 600 Units  600 Units InterCATHeter PRN  pantoprazole (PROTONIX) tablet 40 mg  40 mg Oral DAILY  sodium chloride (NS) flush 5-10 mL  5-10 mL IntraVENous PRN  
 albuterol (PROVENTIL VENTOLIN) nebulizer solution 2.5 mg  2.5 mg Nebulization Q4H RT  
 aspirin delayed-release tablet 81 mg  81 mg Oral DAILY  budesonide (PULMICORT) 500 mcg/2 ml nebulizer suspension  500 mcg Nebulization BID RT  
 gabapentin (NEURONTIN) capsule 100 mg  100 mg Oral QHS  nicotine (NICODERM CQ) 14 mg/24 hr patch 1 Patch  1 Patch TransDERmal QHS  sertraline (ZOLOFT) tablet 100 mg  100 mg Oral DAILY  haloperidol lactate (HALDOL) injection 2 mg  2 mg IntraVENous Q4H PRN  
 hydrALAZINE (APRESOLINE) 20 mg/mL injection 20 mg  20 mg IntraVENous Q6H PRN  
 sodium chloride (NS) flush 5-10 mL  5-10 mL IntraVENous Q8H  
 sodium chloride (NS) flush 5-10 mL  5-10 mL IntraVENous PRN  
 acetaminophen (TYLENOL) tablet 650 mg  650 mg Oral Q4H PRN  
 HYDROcodone-acetaminophen (NORCO)  mg tablet 1 Tab  1 Tab Oral Q4H PRN  
 naloxone (NARCAN) injection 0.4 mg  0.4 mg IntraVENous PRN  
 diphenhydrAMINE (BENADRYL) capsule 25 mg  25 mg Oral Q4H PRN  
 bisacodyl (DULCOLAX) tablet 5 mg  5 mg Oral DAILY PRN  
 LORazepam (ATIVAN) tablet 1 mg  1 mg Oral BID PRN  
 ondansetron (ZOFRAN) injection 4 mg  4 mg IntraVENous Q4H PRN Other Studies (last 24 hours): No results found. Assessment and Plan:  
 
Hospital Problems as of 11/27/2018 Date Reviewed: 11/27/2018 Codes Class Noted - Resolved POA Anemia ICD-10-CM: D64.9 ICD-9-CM: 285.9  11/24/2018 - Present Yes * (Principal) Acute on chronic respiratory failure with hypoxia and hypercapnia (HCC) ICD-10-CM: J96.21, J96.22 
ICD-9-CM: 518.84, 786.09, 799.02  11/22/2018 - Present Yes HTN (hypertension), malignant (Chronic) ICD-10-CM: I10 
ICD-9-CM: 401.0  11/21/2018 - Present Yes Metabolic encephalopathy UYB-91-IO: G93.41 
ICD-9-CM: 348.31  11/15/2018 - Present Yes Diastolic heart failure (HCC) (Chronic) ICD-10-CM: I50.30 ICD-9-CM: 428.30  3/9/2013 - Present Yes Mixed dyslipidemia (Chronic) ICD-10-CM: U01.1 ICD-9-CM: 272.2  2/28/2013 - Present Yes COPD, very severe ([facility] [address] (Chronic) ICD-10-CM: J44.9 ICD-9-CM: 955  2/28/2013 - Present Yes Overview Addendum 11/30/2017  9:09 AM by Lucian Carvajal, NP  
  FEV1 26% RESOLVED: HCAP (healthcare-associated pneumonia) ICD-10-CM: J18.9 ICD-9-CM: 524  11/22/2018 - 11/26/2018 Yes RESOLVED: Sepsis ([facility] [address] ICD-10-CM: A41.9 ICD-9-CM: 038.9, 995.91  11/15/2018 - 11/26/2018 Yes PLAN:   
Acute hypercapnic and hypoxic resp failure- improving- requiring bipap- pulmonary following Acute copd exa- on steroids, nebs- improving. Anemia-stable. Acute metabolic encephalopathy- resolved Diastolic chf- stable 
htn- controlled DC planning/Dispo:  rehab DVT ppx:  heparin Signed: 
Alton Marshall MD

## 2018-11-28 NOTE — PROGRESS NOTES
Ativan effective. Patient resting quietly in bed at this time, eyes closed, respirations present. Bed alarm in place for safety. Call light within reach.

## 2018-11-28 NOTE — PROGRESS NOTES
Patient is confused and refuses to wear BiPAP machine at night while sleeping. States she doesn't wear this type of machine at home. Patient was left on 3 liter nasal cannula with a saturation of 92%.

## 2018-11-28 NOTE — PROGRESS NOTES
Received bedside shift report from offgoing nurse, Leti Sandoval. Patient resting quietly in bed at this time, awake, respirations even and unlabored. Denies needs at this time. Bed alarm in place for safety. Call light within reach.

## 2018-11-29 NOTE — PROGRESS NOTES
Per [facility] patient discharged to [facility] 11/28/2018, a Non-Preferred Facility. Care Coordinator will schedule follow up call in 21 days post- acute discharge to home. This note will not be viewable in [address]

## 2018-12-02 PROCEDURE — 3331090001 HH PPS REVENUE CREDIT

## 2018-12-02 PROCEDURE — 3331090002 HH PPS REVENUE DEBIT

## 2018-12-03 PROCEDURE — 3331090002 HH PPS REVENUE DEBIT

## 2018-12-03 PROCEDURE — 3331090001 HH PPS REVENUE CREDIT

## 2018-12-04 PROCEDURE — 3331090001 HH PPS REVENUE CREDIT

## 2018-12-04 PROCEDURE — 3331090002 HH PPS REVENUE DEBIT

## 2018-12-05 LAB
BACTERIA SPEC CULT: ABNORMAL
GRAM STN SPEC: ABNORMAL
Lab: NORMAL
REFERENCE LAB,REFLB: NORMAL
SERVICE CMNT-IMP: ABNORMAL
TEST DESCRIPTION:,ATST: NORMAL

## 2018-12-05 PROCEDURE — 3331090002 HH PPS REVENUE DEBIT

## 2018-12-05 PROCEDURE — 3331090001 HH PPS REVENUE CREDIT

## 2018-12-06 PROCEDURE — 3331090001 HH PPS REVENUE CREDIT

## 2018-12-06 PROCEDURE — 3331090002 HH PPS REVENUE DEBIT

## 2018-12-07 ENCOUNTER — HOME CARE VISIT (OUTPATIENT)
Dept: HOME HEALTH SERVICES | Facility: HOME HEALTH | Age: 78
End: 2018-12-07
Payer: MEDICARE

## 2018-12-07 PROCEDURE — 3331090001 HH PPS REVENUE CREDIT

## 2018-12-07 PROCEDURE — 3331090002 HH PPS REVENUE DEBIT

## 2018-12-08 PROCEDURE — 3331090001 HH PPS REVENUE CREDIT

## 2018-12-08 PROCEDURE — 3331090002 HH PPS REVENUE DEBIT

## 2018-12-09 PROCEDURE — 3331090001 HH PPS REVENUE CREDIT

## 2018-12-09 PROCEDURE — 3331090002 HH PPS REVENUE DEBIT

## 2018-12-10 LAB
Lab: NORMAL
REFERENCE LAB,REFLB: NORMAL
TEST DESCRIPTION:,ATST: NORMAL

## 2018-12-10 PROCEDURE — 3331090001 HH PPS REVENUE CREDIT

## 2018-12-10 PROCEDURE — 3331090002 HH PPS REVENUE DEBIT

## 2018-12-12 LAB
BACTERIA SPEC CULT: ABNORMAL
BACTERIA SPEC CULT: ABNORMAL
GRAM STN SPEC: ABNORMAL
SERVICE CMNT-IMP: ABNORMAL

## 2019-01-10 LAB
Lab: NORMAL
REFERENCE LAB,REFLB: NORMAL
TEST DESCRIPTION:,ATST: NORMAL
